# Patient Record
Sex: FEMALE | Race: WHITE | NOT HISPANIC OR LATINO | URBAN - METROPOLITAN AREA
[De-identification: names, ages, dates, MRNs, and addresses within clinical notes are randomized per-mention and may not be internally consistent; named-entity substitution may affect disease eponyms.]

---

## 2020-06-03 PROBLEM — G47.00 INSOMNIA: Status: ACTIVE | Noted: 2020-06-03

## 2020-06-03 PROBLEM — G56.00 CARPAL TUNNEL SYNDROME: Status: ACTIVE | Noted: 2020-06-03

## 2020-06-03 PROBLEM — J30.9 ALLERGIC RHINITIS: Status: ACTIVE | Noted: 2020-06-03

## 2020-06-03 PROBLEM — J45.30 MILD PERSISTENT ASTHMA: Status: ACTIVE | Noted: 2020-06-03

## 2020-06-03 PROBLEM — G43.119 CLASSICAL MIGRAINE WITH INTRACTABLE MIGRAINE: Status: ACTIVE | Noted: 2020-06-03

## 2020-06-03 PROBLEM — F33.1 MODERATE EPISODE OF RECURRENT MAJOR DEPRESSIVE DISORDER (H): Status: ACTIVE | Noted: 2017-04-19

## 2020-06-09 ENCOUNTER — OFFICE VISIT (OUTPATIENT)
Dept: NEUROLOGY | Facility: CLINIC | Age: 45
End: 2020-06-09
Payer: COMMERCIAL

## 2020-06-09 VITALS — HEIGHT: 65 IN

## 2020-06-09 DIAGNOSIS — E66.01 MORBID OBESITY (H): ICD-10-CM

## 2020-06-09 DIAGNOSIS — G43.111 INTRACTABLE MIGRAINE WITH AURA WITH STATUS MIGRAINOSUS: Primary | ICD-10-CM

## 2020-06-09 DIAGNOSIS — F90.0 ATTENTION DEFICIT HYPERACTIVITY DISORDER (ADHD), PREDOMINANTLY INATTENTIVE TYPE: ICD-10-CM

## 2020-06-09 DIAGNOSIS — F33.0 MILD EPISODE OF RECURRENT MAJOR DEPRESSIVE DISORDER (H): ICD-10-CM

## 2020-06-09 PROCEDURE — 99214 OFFICE O/P EST MOD 30 MIN: CPT | Mod: GT | Performed by: PSYCHIATRY & NEUROLOGY

## 2020-06-09 RX ORDER — BUTALBITAL, ASPIRIN, AND CAFFEINE 325; 50; 40 MG/1; MG/1; MG/1
1 CAPSULE ORAL EVERY 4 HOURS PRN
Qty: 30 CAPSULE | Refills: 1 | Status: SHIPPED | OUTPATIENT
Start: 2020-06-09 | End: 2021-09-09

## 2020-06-09 NOTE — LETTER
6/9/2020         RE: Efra Ames  [address] [facility] [address]        Dear Colleague,    Thank you for referring your patient, Efra Ames, to the [facility] NEUROLOGY [place]. Please see a copy of my visit note below.        NEUROLOGY NOTE        Assessment/Plan          Intractable classic migraine with aura, with status migrainosus, more than 20 days a month in the past, currently 1 spell of 7 days migraines monthly on Ajovy started in 2019. Failed beta-blocker, propranolol, tried antidepressants sent with SSRIs without benefits, also tried anticonvulsant but not able to tolerate topiramate. Not interested in try cyclic antidepressant due to weight gain issue with her already having morbid obesity at the same time due to very active severe depression and fearful of drug interactions.  Never tried Botox injections.  Triptan did not work too well.  Currently using Tylenol ibuprofen, does not work well.  Used to use Fiorinal which helps the headache but did feel knocked out in 1 to be sleeping for a few hours.  Like to try it again.  We will send a prescription to the pharmacist.  We will follow-up in about a year.  Discussed about newer generation of abortive migraine medications.    Major depression no recent suicidal ideation. Follow-up closely with psychiatry and psychologist, primary and psychiatrist.    Morbid obesity: Recommend exercise and diet control.    Chronic insomnia due to combination of psychiatric condition and may be living style. On trazodone for this.    ADHD: On Adderall X are 20 mg daily.    This is a virtual visit due to COVID-19 Pandemic to mitigate potential disease spreading. Consent obtained for charge with this visit. Total time spent about 24 minutes.       SUBJECTIVE       Efra Ames is a 45 year old female seen for intractable migraine and other neurological conditions..      The patient came here for second opinion/transfer care to  manage her migraines. She did not like the previous office in general.    She has had migraine since age of 12 but over the years has been gotten much more severe and intractable.    Migraine: She usually has constant pain in her right eye then it can spread to any directions, starting with visual auras sometimes floaters darkness/scotoma and photo fortification, followed by severe headaches and throbs, nausea, vomiting, sensitivity to light, sound, sometimes smell. Over-the-counter medication in general does not work so she does not take them. Recently tried Zomig nasal spray with some benefit but not able to get rid of it completely. The headaches last for a whole day up to 20 days a spell. She has more than 20 severe migraine headaches a month.    She has tried topiramate and that makes migraine much worse. She has been on and off tried propranolol for many years without benefits. She is taking SSRIs for her depression on and off without any benefit for her migraine. She is not interested in amitriptyline/nortriptyline for fear of interaction with other antidepressants. She has not tried calcium channel blockers.    Migraine started as a teenager.     She has major depression and recent suicidal ideation currently attending outpatient treatment working closely with our psychologist and psychiatrist. Cymbalta was recently added.    She has chronic insomnia due to major depression.    She has morbid obesity. She is active but not exercising regularly.    She is working as a health assistance.    She has extensive family history of gynecological cancer and her mother side. As result she had bilateral oophorectomy.    She had MRI study of the brain 20 years ago unremarkable.           Problem List     Patient Active Problem List    Diagnosis Date Noted     Allergic rhinitis 06/03/2020     Priority: Medium     Immunotherapy:              Started: 7/23/2014              Antigens:                            Vial A:  "Cats, Dogs, Grass, Trees & Weeds (Red Vial 0.5mL)                            Vial B: Ragweed, Dust Mites & Molds (Red Vial 0.5mL)                Frequency: Every 2-4 week              Interim Systemic Reactions: no              Local Injection Site Reactions: yes, but these don't bother her              Pre-Medications: Cetirizine (Zyrtec)                 Brings Epi-Pen to all IT Shots: no              Has she found benefit from IT: yes                          If yes, how: Reduced \"contact hives\"              Has she ever stopped IT? no              Can you tell when you are due for your next IT shot? no       Carpal tunnel syndrome 06/03/2020     Priority: Medium     Classical migraine with intractable migraine 06/03/2020     Priority: Medium     Insomnia 06/03/2020     Priority: Medium     Mild persistent asthma 06/03/2020     Priority: Medium     Moderate episode of recurrent major depressive disorder (H) 04/19/2017     Priority: Medium     Pre-diabetes 09/17/2015     Priority: Medium     Migraine headache 01/21/2015     Priority: Medium     Propranolol started July 31, 2017, and referred to neurology  Occasionally uses hydrocodone for breakthrough headaches, 20 tablets last several months.  [facility], second opinion, to begin anticalcitonin gene related peptide medication and verapamil. Also thinking about Botox.       History of strabismus surgery 11/21/2013     Priority: Medium     Gluten intolerance 12/26/2012     Priority: Medium     Improved with diet modifications       Anxiety state 06/06/2012     Priority: Medium     Anisometropia 08/30/2010     Priority: Medium     Hypotropia of left eye 08/30/2010     Priority: Medium         Past medical history     No past surgical history on file.    No past medical history on file.      Family history     Family History   Problem Relation Age of Onset     Migraines Mother          Social history     Social History "     Socioeconomic History     Marital status:      Spouse name: Not on file     Number of children: Not on file     Years of education: Not on file     Highest education level: Not on file   Occupational History     Not on file   Social Needs     Financial resource strain: Not on file     Food insecurity     Worry: Not on file     Inability: Not on file     Transportation needs     Medical: Not on file     Non-medical: Not on file   Tobacco Use     Smoking status: Never Smoker     Smokeless tobacco: Never Used   Substance and Sexual Activity     Alcohol use: Not on file     Drug use: Not on file     Sexual activity: Not on file   Lifestyle     Physical activity     Days per week: Not on file     Minutes per session: Not on file     Stress: Not on file   Relationships     Social connections     Talks on phone: Not on file     Gets together: Not on file     Attends Sabianist service: Not on file     Active member of club or organization: Not on file     Attends meetings of clubs or organizations: Not on file     Relationship status: Not on file     Intimate partner violence     Fear of current or ex partner: Not on file     Emotionally abused: Not on file     Physically abused: Not on file     Forced sexual activity: Not on file   Other Topics Concern     Parent/sibling w/ CABG, MI or angioplasty before 65F 55M? Not Asked   Social History Narrative     Not on file         Allergy     Imitrex [sumatriptan]; Sulfa drugs; and Topiramate    MEDICATIONS List     Current Outpatient Medications   Medication Sig Dispense Refill     albuterol (PROAIR HFA/PROVENTIL HFA/VENTOLIN HFA) 108 (90 Base) MCG/ACT inhaler Inhale 2 puffs into the lungs       amphetamine-dextroamphetamine (ADDERALL XR) 20 MG 24 hr capsule Take 20 mg by mouth       atorvastatin (LIPITOR) 20 MG tablet Take 20 mg by mouth       cetirizine (ZYRTEC) 10 MG tablet Take 10-20 mg by mouth       FLUoxetine (PROZAC) 20 MG capsule Take 20 mg by mouth        "Fremanezumab-vfrm (AJOVY) 225 MG/1.5ML SOAJ        montelukast (SINGULAIR) 10 MG tablet Take 10 mg by mouth       sertraline (ZOLOFT) 100 MG tablet        traZODone (DESYREL) 100 MG tablet Take 100 mg by mouth             Review of system     10 point system review otherwise unremarkable.     PHYSICAL EXAMINATION     Vital signs in last 24 hours:  Vitals:    06/09/20 0912   Height: 1.651 m (5' 5\")         per patient report, similar to last visit note. Please refer to my last clinic note and and subjective report documentation of the current note.               RESULTS REVIEW         Aby Guerra MD, MD, PhD  Neurology   Office tel: [phone]        Again, thank you for allowing me to participate in the care of your patient.        Sincerely,        Aby Guerra MD    "

## 2020-06-09 NOTE — PROGRESS NOTES
NEUROLOGY NOTE        Assessment/Plan          Intractable classic migraine with aura, with status migrainosus, more than 20 days a month in the past, currently 1 spell of 7 days migraines monthly on Ajovy started in 2019. Failed beta-blocker, propranolol, tried antidepressants sent with SSRIs without benefits, also tried anticonvulsant but not able to tolerate topiramate. Not interested in try cyclic antidepressant due to weight gain issue with her already having morbid obesity at the same time due to very active severe depression and fearful of drug interactions.  Never tried Botox injections.  Triptan did not work too well.  Currently using Tylenol ibuprofen, does not work well.  Used to use Fiorinal which helps the headache but did feel knocked out in 1 to be sleeping for a few hours.  Like to try it again.  We will send a prescription to the pharmacist.  We will follow-up in about a year.  Discussed about newer generation of abortive migraine medications.    Major depression no recent suicidal ideation. Follow-up closely with psychiatry and psychologist, primary and psychiatrist.    Morbid obesity: Recommend exercise and diet control.    Chronic insomnia due to combination of psychiatric condition and may be living style. On trazodone for this.    ADHD: On Adderall X are 20 mg daily.    This is a virtual visit due to COVID-19 Pandemic to mitigate potential disease spreading. Consent obtained for charge with this visit. Total time spent about 24 minutes.       SUBJECTIVE       Efra Ames is a 45 year old female seen for intractable migraine and other neurological conditions..      The patient came here for second opinion/transfer care to manage her migraines. She did not like the previous office in general.    She has had migraine since age of 12 but over the years has been gotten much more severe and intractable.    Migraine: She usually has constant pain in her right eye then it can spread to any  directions, starting with visual auras sometimes floaters darkness/scotoma and photo fortification, followed by severe headaches and throbs, nausea, vomiting, sensitivity to light, sound, sometimes smell. Over-the-counter medication in general does not work so she does not take them. Recently tried Zomig nasal spray with some benefit but not able to get rid of it completely. The headaches last for a whole day up to 20 days a spell. She has more than 20 severe migraine headaches a month.    She has tried topiramate and that makes migraine much worse. She has been on and off tried propranolol for many years without benefits. She is taking SSRIs for her depression on and off without any benefit for her migraine. She is not interested in amitriptyline/nortriptyline for fear of interaction with other antidepressants. She has not tried calcium channel blockers.    Migraine started as a teenager.     She has major depression and recent suicidal ideation currently attending outpatient treatment working closely with our psychologist and psychiatrist. Cymbalta was recently added.    She has chronic insomnia due to major depression.    She has morbid obesity. She is active but not exercising regularly.    She is working as a health assistance.    She has extensive family history of gynecological cancer and her mother side. As result she had bilateral oophorectomy.    She had MRI study of the brain 20 years ago unremarkable.           Problem List     Patient Active Problem List    Diagnosis Date Noted     Allergic rhinitis 06/03/2020     Priority: Medium     Immunotherapy:              Started: 7/23/2014              Antigens:                            Vial A: Cats, Dogs, Grass, Trees & Weeds (Red Vial 0.5mL)                            Vial B: Ragweed, Dust Mites & Molds (Red Vial 0.5mL)                Frequency: Every 2-4 week              Interim Systemic Reactions: no              Local Injection Site Reactions: yes, but  "these don't bother her              Pre-Medications: Cetirizine (Zyrtec)                 Brings Epi-Pen to all IT Shots: no              Has she found benefit from IT: yes                          If yes, how: Reduced \"contact hives\"              Has she ever stopped IT? no              Can you tell when you are due for your next IT shot? no       Carpal tunnel syndrome 06/03/2020     Priority: Medium     Classical migraine with intractable migraine 06/03/2020     Priority: Medium     Insomnia 06/03/2020     Priority: Medium     Mild persistent asthma 06/03/2020     Priority: Medium     Moderate episode of recurrent major depressive disorder (H) 04/19/2017     Priority: Medium     Pre-diabetes 09/17/2015     Priority: Medium     Migraine headache 01/21/2015     Priority: Medium     Propranolol started July 31, 2017, and referred to neurology  Occasionally uses hydrocodone for breakthrough headaches, 20 tablets last several months.  [facility], second opinion, to begin anticalcitonin gene related peptide medication and verapamil. Also thinking about Botox.       History of strabismus surgery 11/21/2013     Priority: Medium     Gluten intolerance 12/26/2012     Priority: Medium     Improved with diet modifications       Anxiety state 06/06/2012     Priority: Medium     Anisometropia 08/30/2010     Priority: Medium     Hypotropia of left eye 08/30/2010     Priority: Medium         Past medical history     No past surgical history on file.    No past medical history on file.      Family history     Family History   Problem Relation Age of Onset     Migraines Mother          Social history     Social History     Socioeconomic History     Marital status:      Spouse name: Not on file     Number of children: Not on file     Years of education: Not on file     Highest education level: Not on file   Occupational History     Not on file   Social Needs     Financial resource " strain: Not on file     Food insecurity     Worry: Not on file     Inability: Not on file     Transportation needs     Medical: Not on file     Non-medical: Not on file   Tobacco Use     Smoking status: Never Smoker     Smokeless tobacco: Never Used   Substance and Sexual Activity     Alcohol use: Not on file     Drug use: Not on file     Sexual activity: Not on file   Lifestyle     Physical activity     Days per week: Not on file     Minutes per session: Not on file     Stress: Not on file   Relationships     Social connections     Talks on phone: Not on file     Gets together: Not on file     Attends Worship service: Not on file     Active member of club or organization: Not on file     Attends meetings of clubs or organizations: Not on file     Relationship status: Not on file     Intimate partner violence     Fear of current or ex partner: Not on file     Emotionally abused: Not on file     Physically abused: Not on file     Forced sexual activity: Not on file   Other Topics Concern     Parent/sibling w/ CABG, MI or angioplasty before 65F 55M? Not Asked   Social History Narrative     Not on file         Allergy     Imitrex [sumatriptan]; Sulfa drugs; and Topiramate    MEDICATIONS List     Current Outpatient Medications   Medication Sig Dispense Refill     albuterol (PROAIR HFA/PROVENTIL HFA/VENTOLIN HFA) 108 (90 Base) MCG/ACT inhaler Inhale 2 puffs into the lungs       amphetamine-dextroamphetamine (ADDERALL XR) 20 MG 24 hr capsule Take 20 mg by mouth       atorvastatin (LIPITOR) 20 MG tablet Take 20 mg by mouth       cetirizine (ZYRTEC) 10 MG tablet Take 10-20 mg by mouth       FLUoxetine (PROZAC) 20 MG capsule Take 20 mg by mouth       Fremanezumab-vfrm (AJOVY) 225 MG/1.5ML SOAJ        montelukast (SINGULAIR) 10 MG tablet Take 10 mg by mouth       sertraline (ZOLOFT) 100 MG tablet        traZODone (DESYREL) 100 MG tablet Take 100 mg by mouth             Review of system     10 point system review otherwise  "unremarkable.     PHYSICAL EXAMINATION     Vital signs in last 24 hours:  Vitals:    06/09/20 0912   Height: 1.651 m (5' 5\")         per patient report, similar to last visit note. Please refer to my last clinic note and and subjective report documentation of the current note.               RESULTS REVIEW         Aby Guerra MD, MD, PhD  Neurology   Office tel: [phone]      "

## 2020-06-29 ENCOUNTER — TELEPHONE (OUTPATIENT)
Dept: NEUROLOGY | Facility: CLINIC | Age: 45
End: 2020-06-29

## 2021-09-09 ENCOUNTER — OFFICE VISIT (OUTPATIENT)
Dept: NEUROLOGY | Facility: CLINIC | Age: 46
End: 2021-09-09
Payer: COMMERCIAL

## 2021-09-09 VITALS — HEART RATE: 109 BPM | DIASTOLIC BLOOD PRESSURE: 80 MMHG | SYSTOLIC BLOOD PRESSURE: 113 MMHG | HEIGHT: 65 IN

## 2021-09-09 DIAGNOSIS — G43.719 INTRACTABLE CHRONIC MIGRAINE WITHOUT AURA AND WITHOUT STATUS MIGRAINOSUS: Primary | ICD-10-CM

## 2021-09-09 PROCEDURE — 99215 OFFICE O/P EST HI 40 MIN: CPT | Performed by: PSYCHIATRY & NEUROLOGY

## 2021-09-09 RX ORDER — RIZATRIPTAN BENZOATE 10 MG/1
10 TABLET ORAL
Qty: 18 TABLET | Refills: 1 | Status: SHIPPED | OUTPATIENT
Start: 2021-09-09 | End: 2021-10-25

## 2021-09-09 RX ORDER — FLUTICASONE PROPIONATE AND SALMETEROL XINAFOATE 230; 21 UG/1; UG/1
1 AEROSOL, METERED RESPIRATORY (INHALATION)
COMMUNITY
Start: 2021-03-23

## 2021-09-09 RX ORDER — MULTIVITAMIN
1 TABLET ORAL
COMMUNITY

## 2021-09-09 NOTE — PROGRESS NOTES
NEUROLOGY OUTPATIENT PROGRESS NOTE   Sep 9, 2021     CHIEF COMPLAINT/REASON FOR VISIT/REASON FOR CONSULT  Patient presents with:  Migraine: Nausea and lethargic     REASON FOR CONSULTATION- Headaches    REFERRAL SOURCE  Dr. Guerra  CC Dr. Guerra    HISTORY OF PRESENT ILLNESS  Efra Ames is a 46 year old female seen today for evaluation of headaches. She reports that she has been having headaches since age 12. Previously was seen by Dr. Cuadra. Headaches have been 20 days a month. She was put on Ajovy by Dr. Guerra which reduce the headaches to 10 days a month but currently she is switching insurances and this has been stopped.    Headaches are generally bitemporal. They can also be in the frontal region. Sometimes it is in the middle of the head. Headaches are more of a pressure and occasional throbbing. There is occasional visual auras. She reports associated photophobia phonophobia and nausea. Occasionally will get dizziness and even fall down with the headaches. Denies any clear triggers. Does have mental illness which can affect the headaches. She will get difficulty with thinking when she has a bad headache.    Patient is tried Topamax which she did not tolerate has also been on propanolol which she did not tolerate. Has not tried nortriptyline or amitriptyline. Has been on multiple antidepressants for her depression and anxiety. Currently does not want to add more antidepressants due to weight gain issues and being on multiple antidepressants. For abortive therapy she has been on Zomig which did not work. Has also tried Imitrex which did not work. Has tried over-the-counter Tylenol and ibuprofen without benefit.    Previous history is reviewed and this is unchanged.    PAST MEDICAL/SURGICAL HISTORY  No past medical history on file.  Patient Active Problem List   Diagnosis     Allergic rhinitis     Anisometropia     Anxiety state     Carpal tunnel syndrome     Classical migraine with intractable migraine     Gluten  "intolerance     History of strabismus surgery     Hypotropia of left eye     Insomnia     Migraine headache     Mild persistent asthma     Moderate episode of recurrent major depressive disorder (H)     Pre-diabetes   Significant depression, anxiety, prediabetes, gluten intolerance, high cholesterol    FAMILY HISTORY  Family History   Problem Relation Age of Onset     Migraines Mother    Positive for mother and grandmother with migraines. Daughter with migraines    SOCIAL HISTORY  Social History     Tobacco Use     Smoking status: Never Smoker     Smokeless tobacco: Never Used   Substance Use Topics     Alcohol use: None     Drug use: None       SYSTEMS REVIEW  Twelve-system ROS was done and other than the HPI this was negative except for numbness and tingling, balance coordination problems, dizziness, memory loss, anxiety, depression, respiratory problems/asthma.    MEDICATIONS  albuterol (PROAIR HFA/PROVENTIL HFA/VENTOLIN HFA) 108 (90 Base) MCG/ACT inhaler, Inhale 2 puffs into the lungs  amphetamine-dextroamphetamine (ADDERALL XR) 20 MG 24 hr capsule, Take 20 mg by mouth  cetirizine (ZYRTEC) 10 MG tablet, Take 10-20 mg by mouth  cholecalciferol 50 MCG (2000 UT) CAPS, 3 daily  FLUoxetine (PROZAC) 20 MG capsule, Take 20 mg by mouth  fluticasone-salmeterol (ADVAIR HFA) 230-21 MCG/ACT inhaler, Inhale 1 puff into the lungs  Specialty Vitamins Products (VITAMINS FOR HAIR) TABS, Take 1 tablet by mouth  traZODone (DESYREL) 100 MG tablet, Take 100 mg by mouth  atorvastatin (LIPITOR) 20 MG tablet, Take 20 mg by mouth (Patient not taking: Reported on 9/9/2021)  montelukast (SINGULAIR) 10 MG tablet, Take 10 mg by mouth (Patient not taking: Reported on 9/9/2021)  sertraline (ZOLOFT) 100 MG tablet,     No current facility-administered medications on file prior to visit.       PHYSICAL EXAMINATION  VITALS: /80 (BP Location: Left arm, Patient Position: Sitting)   Pulse 109   Ht 1.651 m (5' 5\")   GENERAL: Healthy " appearing, alert, no acute distress, normal habitus.  CARDIOVASCULAR: Extremities warm and well perfused. Pulses present.   EYES: Funduscopic exam limited.  NEUROLOGICAL:  Patient is awake and oriented to self, place and time.  Attention span is normal.  Memory is grossly intact.  Language is fluent and follows commands appropriately.  Appropriate fund of knowledge. Cranial nerves 2-12 are intact. There is no pronator drift.  Motor exam shows 5/5 strength in all extremities.  Tone is symmetric bilaterally in upper and lower extremities.  Reflexes are symmetric and 2+ in upper extremities and lower extremities. Sensory exam is grossly intact to light touch, pin prick and vibration.  Finger to nose and heel to shin is without dysmetria.  Romberg is negative.  Gait is normal and the patient is able to do tandem walk and walk on toes and heels.      DIAGNOSTICS  RELEVANT LABS  Recent BMP shows elevated creatinine of 1.21.  TSH normal in the past.    OUTSIDE RECORDS  Notes from Dr. Guerra reviewed. Pertinent information is noted in the HPI.    IMPRESSION/REPORT/PLAN  Intractable chronic migraine without aura and without status migrainosus  Obesity  Concurrent use of multiple antidepressants    This is a 46 year old female with headache suggestive of chronic migraines that are refractory to multiple medications. I would like to get a MRI of the brain to rule out any structural lesions. She has tried Topamax, propanolol, Ajovy without any significant benefit. Ajovy did provide partial benefit though did not completely take care of the headaches. For abortive therapy she is tried Imitrex and Zomig without any benefit. Over-the-counter medications are not helpful. She finds benefit with opioids but due to risk of addiction we do not want to use that. Fiorinal did not help. We will put her on Maxalt for abortive therapy and see how she does. We will try to get approval for Botox to see if she would respond to that. Encouraged her  "to keep a log of her headaches to see if pattern can be identified. I can see her back in about a month.    -     MR Brain w/o Contrast; Future  -     rizatriptan (MAXALT) 10 MG tablet; Take 1 tablet (10 mg) by mouth at onset of headache for migraine May repeat in 2 hours.  -     Botulinum Toxin Type A (BOTOX) 200 units injection 200 Units    Return in about 1 month (around 10/9/2021) for In-Clinic Visit, After testing, Botox.     \" The patient has a diagnosis of chronic migraines. She has more than 15 headache days a month with each headache lasting at least 4 hours despite use of triptans. Her headaches have been there for over 6 months. She has been screened for medication overuse headaches and she does not have that. She has tried Topamax, Propranolol for 3 months without any significant benefit. Antidepressants cannot be added since she is on multiple antidepressants already. She has tried Ajovy with minimal success. I would request that the Botox be approved for her. \"    Over 44 minutes were spent coordinating the care for the patient on the day of the encounter.  This includes previsit, during visit and post visit activities as documented above.  Patient new to me. Reviewing chart. Counseling patient regarding migraine treatments. Working on getting Botox prior authorization  (Activities include but not inclusive of reviewing chart, reviewing outside records, reviewing labs and imaging study results as well as the images, patient visit time including getting history and exam,  use if applicable, review of test results with the patient and coming up with a plan in a shared model, counseling patient and family, education and answering patient questions, EMR , EMR diagnosis entry and problem list management, medication reconciliation and prescription management if applicable, paperwork if applicable, printing documents and documentation of the visit activities.)        Edi Rojo, " MD  Neurologist  [facility]  Tel:- [phone]    This note was dictated using voice recognition software.  Any grammatical or context distortions are unintentional and inherent to the software.

## 2021-09-09 NOTE — NURSING NOTE
Chief Complaint   Patient presents with     Migraine     Nausea and lethargic      Geoffrey Bowles MA on 9/9/2021 at 12:26 PM

## 2021-09-09 NOTE — LETTER
9/9/2021         RE: Efra Ames  [address] [place] [facility] [address]        Dear Colleague,    Thank you for referring your patient, Efra Ames, to the [facility] [place]. Please see a copy of my visit note below.    NEUROLOGY OUTPATIENT PROGRESS NOTE   Sep 9, 2021     CHIEF COMPLAINT/REASON FOR VISIT/REASON FOR CONSULT  Patient presents with:  Migraine: Nausea and lethargic     REASON FOR CONSULTATION- Headaches    REFERRAL SOURCE  Dr. Guerra  CC Dr. Guerra    HISTORY OF PRESENT ILLNESS  Efra Ames is a 46 year old female seen today for evaluation of headaches. She reports that she has been having headaches since age 12. Previously was seen by Dr. Cuadra. Headaches have been 20 days a month. She was put on Ajovy by Dr. Guerra which reduce the headaches to 10 days a month but currently she is switching insurances and this has been stopped.    Headaches are generally bitemporal. They can also be in the frontal region. Sometimes it is in the middle of the head. Headaches are more of a pressure and occasional throbbing. There is occasional visual auras. She reports associated photophobia phonophobia and nausea. Occasionally will get dizziness and even fall down with the headaches. Denies any clear triggers. Does have mental illness which can affect the headaches. She will get difficulty with thinking when she has a bad headache.    Patient is tried Topamax which she did not tolerate has also been on propanolol which she did not tolerate. Has not tried nortriptyline or amitriptyline. Has been on multiple antidepressants for her depression and anxiety. Currently does not want to add more antidepressants due to weight gain issues and being on multiple antidepressants. For abortive therapy she has been on Zomig which did not work. Has also tried Imitrex which did not work. Has tried over-the-counter Tylenol and ibuprofen without benefit.    Previous history is reviewed and this  is unchanged.    PAST MEDICAL/SURGICAL HISTORY  No past medical history on file.  Patient Active Problem List   Diagnosis     Allergic rhinitis     Anisometropia     Anxiety state     Carpal tunnel syndrome     Classical migraine with intractable migraine     Gluten intolerance     History of strabismus surgery     Hypotropia of left eye     Insomnia     Migraine headache     Mild persistent asthma     Moderate episode of recurrent major depressive disorder (H)     Pre-diabetes   Significant depression, anxiety, prediabetes, gluten intolerance, high cholesterol    FAMILY HISTORY  Family History   Problem Relation Age of Onset     Migraines Mother    Positive for mother and grandmother with migraines. Daughter with migraines    SOCIAL HISTORY  Social History     Tobacco Use     Smoking status: Never Smoker     Smokeless tobacco: Never Used   Substance Use Topics     Alcohol use: None     Drug use: None       SYSTEMS REVIEW  Twelve-system ROS was done and other than the HPI this was negative except for numbness and tingling, balance coordination problems, dizziness, memory loss, anxiety, depression, respiratory problems/asthma.    MEDICATIONS  albuterol (PROAIR HFA/PROVENTIL HFA/VENTOLIN HFA) 108 (90 Base) MCG/ACT inhaler, Inhale 2 puffs into the lungs  amphetamine-dextroamphetamine (ADDERALL XR) 20 MG 24 hr capsule, Take 20 mg by mouth  cetirizine (ZYRTEC) 10 MG tablet, Take 10-20 mg by mouth  cholecalciferol 50 MCG (2000 UT) CAPS, 3 daily  FLUoxetine (PROZAC) 20 MG capsule, Take 20 mg by mouth  fluticasone-salmeterol (ADVAIR HFA) 230-21 MCG/ACT inhaler, Inhale 1 puff into the lungs  Specialty Vitamins Products (VITAMINS FOR HAIR) TABS, Take 1 tablet by mouth  traZODone (DESYREL) 100 MG tablet, Take 100 mg by mouth  atorvastatin (LIPITOR) 20 MG tablet, Take 20 mg by mouth (Patient not taking: Reported on 9/9/2021)  montelukast (SINGULAIR) 10 MG tablet, Take 10 mg by mouth (Patient not taking: Reported on  "9/9/2021)  sertraline (ZOLOFT) 100 MG tablet,     No current facility-administered medications on file prior to visit.       PHYSICAL EXAMINATION  VITALS: /80 (BP Location: Left arm, Patient Position: Sitting)   Pulse 109   Ht 1.651 m (5' 5\")   GENERAL: Healthy appearing, alert, no acute distress, normal habitus.  CARDIOVASCULAR: Extremities warm and well perfused. Pulses present.   EYES: Funduscopic exam limited.  NEUROLOGICAL:  Patient is awake and oriented to self, place and time.  Attention span is normal.  Memory is grossly intact.  Language is fluent and follows commands appropriately.  Appropriate fund of knowledge. Cranial nerves 2-12 are intact. There is no pronator drift.  Motor exam shows 5/5 strength in all extremities.  Tone is symmetric bilaterally in upper and lower extremities.  Reflexes are symmetric and 2+ in upper extremities and lower extremities. Sensory exam is grossly intact to light touch, pin prick and vibration.  Finger to nose and heel to shin is without dysmetria.  Romberg is negative.  Gait is normal and the patient is able to do tandem walk and walk on toes and heels.      DIAGNOSTICS  RELEVANT LABS  Recent BMP shows elevated creatinine of 1.21.  TSH normal in the past.    OUTSIDE RECORDS  Notes from Dr. Guerra reviewed. Pertinent information is noted in the HPI.    IMPRESSION/REPORT/PLAN  Intractable chronic migraine without aura and without status migrainosus  Obesity  Concurrent use of multiple antidepressants    This is a 46 year old female with headache suggestive of chronic migraines that are refractory to multiple medications. I would like to get a MRI of the brain to rule out any structural lesions. She has tried Topamax, propanolol, Ajovy without any significant benefit. Ajovy did provide partial benefit though did not completely take care of the headaches. For abortive therapy she is tried Imitrex and Zomig without any benefit. Over-the-counter medications are not helpful. " "She finds benefit with opioids but due to risk of addiction we do not want to use that. Fiorinal did not help. We will put her on Maxalt for abortive therapy and see how she does. We will try to get approval for Botox to see if she would respond to that. Encouraged her to keep a log of her headaches to see if pattern can be identified. I can see her back in about a month.    -     MR Brain w/o Contrast; Future  -     rizatriptan (MAXALT) 10 MG tablet; Take 1 tablet (10 mg) by mouth at onset of headache for migraine May repeat in 2 hours.  -     Botulinum Toxin Type A (BOTOX) 200 units injection 200 Units    Return in about 1 month (around 10/9/2021) for In-Clinic Visit, After testing, Botox.     \" The patient has a diagnosis of chronic migraines. She has more than 15 headache days a month with each headache lasting at least 4 hours despite use of triptans. Her headaches have been there for over 6 months. She has been screened for medication overuse headaches and she does not have that. She has tried Topamax, Propranolol for 3 months without any significant benefit. Antidepressants cannot be added since she is on multiple antidepressants already. She has tried Ajovy with minimal success. I would request that the Botox be approved for her. \"    Over 44 minutes were spent coordinating the care for the patient on the day of the encounter.  This includes previsit, during visit and post visit activities as documented above.  Patient new to me. Reviewing chart. Counseling patient regarding migraine treatments. Working on getting Botox prior authorization  (Activities include but not inclusive of reviewing chart, reviewing outside records, reviewing labs and imaging study results as well as the images, patient visit time including getting history and exam,  use if applicable, review of test results with the patient and coming up with a plan in a shared model, counseling patient and family, education and answering " patient questions, EMR , EMR diagnosis entry and problem list management, medication reconciliation and prescription management if applicable, paperwork if applicable, printing documents and documentation of the visit activities.)        Edi Rojo MD  Neurologist  [facility]  Tel:- [phone]    This note was dictated using voice recognition software.  Any grammatical or context distortions are unintentional and inherent to the software.        Again, thank you for allowing me to participate in the care of your patient.        Sincerely,        Edi Rojo MD

## 2021-09-14 ENCOUNTER — TRANSFERRED RECORDS (OUTPATIENT)
Dept: HEALTH INFORMATION MANAGEMENT | Facility: CLINIC | Age: 46
End: 2021-09-14

## 2021-09-28 ENCOUNTER — HOSPITAL ENCOUNTER (OUTPATIENT)
Dept: MRI IMAGING | Facility: HOSPITAL | Age: 46
Discharge: HOME | End: 2021-09-28
Attending: PSYCHIATRY & NEUROLOGY | Admitting: PSYCHIATRY & NEUROLOGY
Payer: COMMERCIAL

## 2021-09-28 DIAGNOSIS — G43.719 INTRACTABLE CHRONIC MIGRAINE WITHOUT AURA AND WITHOUT STATUS MIGRAINOSUS: ICD-10-CM

## 2021-09-28 PROCEDURE — 70551 MRI BRAIN STEM W/O DYE: CPT

## 2021-10-25 ENCOUNTER — OFFICE VISIT (OUTPATIENT)
Dept: NEUROLOGY | Facility: CLINIC | Age: 46
End: 2021-10-25
Payer: COMMERCIAL

## 2021-10-25 VITALS — DIASTOLIC BLOOD PRESSURE: 84 MMHG | SYSTOLIC BLOOD PRESSURE: 131 MMHG | HEART RATE: 104 BPM

## 2021-10-25 DIAGNOSIS — G43.719 INTRACTABLE CHRONIC MIGRAINE WITHOUT AURA AND WITHOUT STATUS MIGRAINOSUS: ICD-10-CM

## 2021-10-25 PROCEDURE — 99214 OFFICE O/P EST MOD 30 MIN: CPT | Mod: 25 | Performed by: PSYCHIATRY & NEUROLOGY

## 2021-10-25 PROCEDURE — 64615 CHEMODENERV MUSC MIGRAINE: CPT | Performed by: PSYCHIATRY & NEUROLOGY

## 2021-10-25 RX ORDER — RIZATRIPTAN BENZOATE 10 MG/1
10 TABLET ORAL
Qty: 18 TABLET | Refills: 11 | Status: SHIPPED | OUTPATIENT
Start: 2021-10-25 | End: 2021-12-27

## 2021-10-25 NOTE — NURSING NOTE
Chief Complaint   Patient presents with     Botox Migraine     Geoffrey Bowles MA on 10/25/2021 at 11:25 AM

## 2021-10-25 NOTE — PROCEDURES
NEUROLOGY PROCEDURE NOTE  Oct 25, 2021      Chronic migraine Botox injection    CONSENT: The procedure was explained to the patient. The risks and benefits of the procedure and the alternatives were discussed with the patient. The patient was given an opportunity to ask any questions she had about the procedure. A verbal consent was obtained from the patient. A written consent is available in the chart.    PRE-PROCEDURE  Diagnosis: Chronic migraine  Headache frequency before the use of Botox:- 25 headache days per month  Headache frequency with Botox use:-NA (first Botox)    EXAM  GENERAL: Healthy appearing, alert, no acute distress, normal habitus.  NEUROLOGICAL:  Patient is alert with fluent language.  Extraocular movements are intact.  Facial movements are present and symmetric.  No arm drift.    PROCEDURE SUMMARY:   The following muscles were identified after palpating for landmarks and the overlying skin was cleansed with alcohol. These muscles were then injected using a 30 guage- half  inch needle with the following doses of onabotulinumtoxin A. A 5 unit/ 0.1 ml dilution was used for the injections. A 2 x 100 unit vial was used.    1. Corrugators 5 units each side.  2. Procerus 5 units.  3. Frontalis 10 units each side.  4. Temporalis 20 units each side.  5. Occipitalis 15 units each side.  6. Paraspinals 10 units each side.  7. Trapezius 15 units each side.    Units Injected: 155 Units  Units Discarded: 45 Units  Lot Number and Expiration: [id number]; 2/2024    The patient tolerated the procedure without any immediate complaints and will call the Neurology clinic if she has any problems or side effects from the medication. The patient will follow up in the Neurology clinic as previously decided.      Edi Rojo MD  Neurologist  [facility]  [phone]

## 2021-10-25 NOTE — LETTER
10/25/2021         RE: Efra Ames  [address] [id number]        Dear Colleague,    Thank you for referring your patient, Efra Ames, to the [facility] [place]. Please see a copy of my visit note below.    NEUROLOGY OUTPATIENT PROGRESS NOTE   Oct 25, 2021     CHIEF COMPLAINT/REASON FOR VISIT/REASON FOR CONSULT  Patient presents with:  Botox Migraine    REASON FOR CONSULTATION- Headaches    REFERRAL SOURCE  Dr. Guerra  CC Dr. Guerra    HISTORY OF PRESENT ILLNESS  Efra Ames is a 46 year old female seen today for evaluation of headaches. She reports that she has been having headaches since age 12. Previously was seen by Dr. Cuadra. Headaches have been 20 days a month. She was put on Ajovy by Dr. Guerra which reduce the headaches to 10 days a month but currently she is switching insurances and this has been stopped.    Headaches are generally bitemporal. They can also be in the frontal region. Sometimes it is in the middle of the head. Headaches are more of a pressure and occasional throbbing. There is occasional visual auras. She reports associated photophobia phonophobia and nausea. Occasionally will get dizziness and even fall down with the headaches. Denies any clear triggers. Does have mental illness which can affect the headaches. She will get difficulty with thinking when she has a bad headache.    Patient is tried Topamax which she did not tolerate has also been on propanolol which she did not tolerate. Has not tried nortriptyline or amitriptyline. Has been on multiple antidepressants for her depression and anxiety. Currently does not want to add more antidepressants due to weight gain issues and being on multiple antidepressants. For abortive therapy she has been on Zomig which did not work. Has also tried Imitrex which did not work. Has tried over-the-counter Tylenol and ibuprofen without benefit.    10/25/21  Patient returns today.  She reports that she  continues to have 25 headache days a month.  She is interested in doing the Botox today but is also interested in trying the Ajovy 1 more time.  She is comfortable with the needles but feels that the Parminder was helping and works in a different mechanism.  Ajovy was stopped last year because the insurance would not cover it anymore.  She reports that the headaches had improved with Ajovy but not completely.  Had questions about the Maxalt.  Headaches have been lasting multiple days.  Headache does become more tolerable after she takes Maxalt for day 1 and wonders if she can take it on day 2 or not.  Discussed with her about taking the Maxalt as soon as she can and repeating it in 2 hours.  She can take the Maxalt the next day the discussed that it might not be as effective as it was on day 1.  She reports no other new issues.    Previous history is reviewed and this is unchanged.    PAST MEDICAL/SURGICAL HISTORY  No past medical history on file.  Patient Active Problem List   Diagnosis     Allergic rhinitis     Anisometropia     Anxiety state     Carpal tunnel syndrome     Classical migraine with intractable migraine     Gluten intolerance     History of strabismus surgery     Hypotropia of left eye     Insomnia     Migraine headache     Mild persistent asthma     Moderate episode of recurrent major depressive disorder (H)     Pre-diabetes   Significant depression, anxiety, prediabetes, gluten intolerance, high cholesterol    FAMILY HISTORY  Family History   Problem Relation Age of Onset     Migraines Mother    Positive for mother and grandmother with migraines. Daughter with migraines    SOCIAL HISTORY  Social History     Tobacco Use     Smoking status: Never Smoker     Smokeless tobacco: Never Used   Substance Use Topics     Alcohol use: Not on file     Drug use: Not on file       SYSTEMS REVIEW  Twelve-system ROS was done and other than the HPI this was negative except for numbness and tingling, balance coordination  problems, dizziness, memory loss, anxiety, depression, respiratory problems/asthma.  No other new issues today.    MEDICATIONS  albuterol (PROAIR HFA/PROVENTIL HFA/VENTOLIN HFA) 108 (90 Base) MCG/ACT inhaler, Inhale 2 puffs into the lungs  amphetamine-dextroamphetamine (ADDERALL XR) 20 MG 24 hr capsule, Take 40 mg by mouth   atorvastatin (LIPITOR) 20 MG tablet, Take 20 mg by mouth   cetirizine (ZYRTEC) 10 MG tablet, Take 10-20 mg by mouth  cholecalciferol 50 MCG (2000 UT) CAPS, 3 daily  FLUoxetine (PROZAC) 20 MG capsule, Take 20 mg by mouth  fluticasone-salmeterol (ADVAIR HFA) 230-21 MCG/ACT inhaler, Inhale 1 puff into the lungs  montelukast (SINGULAIR) 10 MG tablet, Take 10 mg by mouth   Specialty Vitamins Products (VITAMINS FOR HAIR) TABS, Take 1 tablet by mouth  traZODone (DESYREL) 100 MG tablet, Take 100 mg by mouth  sertraline (ZOLOFT) 100 MG tablet,     Botulinum Toxin Type A (BOTOX) 200 units injection 155 Units  Botulinum Toxin Type A (BOTOX) 200 units injection 200 Units         PHYSICAL EXAMINATION  VITALS: /84 (BP Location: Right arm, Patient Position: Sitting)   Pulse 104   GENERAL: Healthy appearing, alert, no acute distress, normal habitus.  CARDIOVASCULAR: Extremities warm and well perfused. Pulses present.   NEUROLOGICAL:  Patient is awake and grossly oriented to self, place and time.  Attention span is normal.  Memory is grossly intact.  Language is fluent and follows commands appropriately.  Appropriate fund of knowledge. Cranial nerves 2-12 are intact. There is no pronator drift.  Motor exam shows 5/5 strength in all extremities.  Tone is symmetric bilaterally in upper and lower extremities.  (Previously reflexes are symmetric and 2+ in upper extremities and lower extremities. Sensory exam is grossly intact to light touch, pin prick and vibration.) Finger to nose and heel to shin is without dysmetria.  Romberg is negative.  Gait is normal and the patient is able to do tandem walk and walk  on toes and heels.      DIAGNOSTICS  RELEVANT LABS  Recent BMP shows elevated creatinine of 1.21.  TSH normal in the past.    OUTSIDE RECORDS  Notes from Dr. Guerra reviewed. Pertinent information is noted in the HPI.     MRI brain-images reviewed with patient.  She does have low-lying cerebellar tonsils though these are very minimal.  No major structural lesions noted.  IMPRESSION:  1.  No evidence of an acute intracranial abnormality.  2.  Low-lying cerebellar tonsils as above.    IMPRESSION/REPORT/PLAN  Intractable chronic migraine without aura and without status migrainosus  Obesity  Concurrent use of multiple antidepressants  Low-lying cerebellar tonsils    This is a 46 year old female with headache suggestive of chronic migraines that are refractory to multiple medications.  MRI brain was negative for structural lesions.  Does show low-lying cerebellar tonsils though most likely these are asymptomatic.  Discussed significance of this with the patient. She has tried Topamax, propanolol, Ajovy without any significant benefit. Ajovy did provide partial benefit though did not completely take care of the headaches.  At this point Ajovy is no longer covered through insurance.  For abortive therapy she is tried Imitrex and Zomig without any benefit. Over-the-counter medications are not helpful. She finds benefit with opioids but due to risk of addiction we do not want to use that. Fiorinal did not help.  Maxalt has slightly been helpful and we discussed proper use of Maxalt.  Discussed that it will become more effective with Botox.  We will proceed with Botox today and then reassess in a few months to see what the next steps would be.  CGRP medication could be tried again down the road.  Encourage her to keep a log of her headaches.  I can see her back in 2 months.    -     rizatriptan (MAXALT) 10 MG tablet; Take 1 tablet (10 mg) by mouth at onset of headache for migraine May repeat in 2 hours.  -     Botulinum Toxin  "Type A (BOTOX) 200 units injection 155 Units    Return in 1 month (on 11/25/2021) for 2 months meds and 3 months Botox.     \" The patient has a diagnosis of chronic migraines. She has more than 15 headache days a month with each headache lasting at least 4 hours despite use of triptans. Her headaches have been there for over 6 months. She has been screened for medication overuse headaches and she does not have that. She has tried Topamax, Propranolol for 3 months without any significant benefit. Antidepressants cannot be added since she is on multiple antidepressants already. She has tried Ajovy with minimal success. I would request that the Botox be approved for her. \"    Over 31 minutes were spent coordinating the care for the patient on the day of the encounter.  This includes previsit, during visit and post visit activities as documented above.  Reviewing MRI images.  Discussion of various medications used to treat headaches.  Discussion of proper use of Maxalt.  (Activities include but not inclusive of reviewing chart, reviewing outside records, reviewing labs and imaging study results as well as the images, patient visit time including getting history and exam,  use if applicable, review of test results with the patient and coming up with a plan in a shared model, counseling patient and family, education and answering patient questions, EMR , EMR diagnosis entry and problem list management, medication reconciliation and prescription management if applicable, paperwork if applicable, printing documents and documentation of the visit activities.)        Edi Rojo MD  Neurologist  [facility]  Tel:- [phone]    This note was dictated using voice recognition software.  Any grammatical or context distortions are unintentional and inherent to the software.        Again, thank you for allowing me to participate in the care of your patient.  "       Sincerely,        Edi Rojo MD

## 2021-10-25 NOTE — PROGRESS NOTES
NEUROLOGY OUTPATIENT PROGRESS NOTE   Oct 25, 2021     CHIEF COMPLAINT/REASON FOR VISIT/REASON FOR CONSULT  Patient presents with:  Botox Migraine    REASON FOR CONSULTATION- Headaches    REFERRAL SOURCE  Dr. Guerra  CC Dr. Guerra    HISTORY OF PRESENT ILLNESS  Efra Ames is a 46 year old female seen today for evaluation of headaches. She reports that she has been having headaches since age 12. Previously was seen by Dr. Cuadra. Headaches have been 20 days a month. She was put on Ajovy by Dr. Guerra which reduce the headaches to 10 days a month but currently she is switching insurances and this has been stopped.    Headaches are generally bitemporal. They can also be in the frontal region. Sometimes it is in the middle of the head. Headaches are more of a pressure and occasional throbbing. There is occasional visual auras. She reports associated photophobia phonophobia and nausea. Occasionally will get dizziness and even fall down with the headaches. Denies any clear triggers. Does have mental illness which can affect the headaches. She will get difficulty with thinking when she has a bad headache.    Patient is tried Topamax which she did not tolerate has also been on propanolol which she did not tolerate. Has not tried nortriptyline or amitriptyline. Has been on multiple antidepressants for her depression and anxiety. Currently does not want to add more antidepressants due to weight gain issues and being on multiple antidepressants. For abortive therapy she has been on Zomig which did not work. Has also tried Imitrex which did not work. Has tried over-the-counter Tylenol and ibuprofen without benefit.    10/25/21  Patient returns today.  She reports that she continues to have 25 headache days a month.  She is interested in doing the Botox today but is also interested in trying the Ajovy 1 more time.  She is comfortable with the needles but feels that the Parminder was helping and works in a different mechanism.  Ajovy was  stopped last year because the insurance would not cover it anymore.  She reports that the headaches had improved with Ajovy but not completely.  Had questions about the Maxalt.  Headaches have been lasting multiple days.  Headache does become more tolerable after she takes Maxalt for day 1 and wonders if she can take it on day 2 or not.  Discussed with her about taking the Maxalt as soon as she can and repeating it in 2 hours.  She can take the Maxalt the next day the discussed that it might not be as effective as it was on day 1.  She reports no other new issues.    Previous history is reviewed and this is unchanged.    PAST MEDICAL/SURGICAL HISTORY  No past medical history on file.  Patient Active Problem List   Diagnosis     Allergic rhinitis     Anisometropia     Anxiety state     Carpal tunnel syndrome     Classical migraine with intractable migraine     Gluten intolerance     History of strabismus surgery     Hypotropia of left eye     Insomnia     Migraine headache     Mild persistent asthma     Moderate episode of recurrent major depressive disorder (H)     Pre-diabetes   Significant depression, anxiety, prediabetes, gluten intolerance, high cholesterol    FAMILY HISTORY  Family History   Problem Relation Age of Onset     Migraines Mother    Positive for mother and grandmother with migraines. Daughter with migraines    SOCIAL HISTORY  Social History     Tobacco Use     Smoking status: Never Smoker     Smokeless tobacco: Never Used   Substance Use Topics     Alcohol use: Not on file     Drug use: Not on file       SYSTEMS REVIEW  Twelve-system ROS was done and other than the HPI this was negative except for numbness and tingling, balance coordination problems, dizziness, memory loss, anxiety, depression, respiratory problems/asthma.  No other new issues today.    MEDICATIONS  albuterol (PROAIR HFA/PROVENTIL HFA/VENTOLIN HFA) 108 (90 Base) MCG/ACT inhaler, Inhale 2 puffs into the  lungs  amphetamine-dextroamphetamine (ADDERALL XR) 20 MG 24 hr capsule, Take 40 mg by mouth   atorvastatin (LIPITOR) 20 MG tablet, Take 20 mg by mouth   cetirizine (ZYRTEC) 10 MG tablet, Take 10-20 mg by mouth  cholecalciferol 50 MCG (2000 UT) CAPS, 3 daily  FLUoxetine (PROZAC) 20 MG capsule, Take 20 mg by mouth  fluticasone-salmeterol (ADVAIR HFA) 230-21 MCG/ACT inhaler, Inhale 1 puff into the lungs  montelukast (SINGULAIR) 10 MG tablet, Take 10 mg by mouth   Specialty Vitamins Products (VITAMINS FOR HAIR) TABS, Take 1 tablet by mouth  traZODone (DESYREL) 100 MG tablet, Take 100 mg by mouth  sertraline (ZOLOFT) 100 MG tablet,     Botulinum Toxin Type A (BOTOX) 200 units injection 155 Units  Botulinum Toxin Type A (BOTOX) 200 units injection 200 Units         PHYSICAL EXAMINATION  VITALS: /84 (BP Location: Right arm, Patient Position: Sitting)   Pulse 104   GENERAL: Healthy appearing, alert, no acute distress, normal habitus.  CARDIOVASCULAR: Extremities warm and well perfused. Pulses present.   NEUROLOGICAL:  Patient is awake and grossly oriented to self, place and time.  Attention span is normal.  Memory is grossly intact.  Language is fluent and follows commands appropriately.  Appropriate fund of knowledge. Cranial nerves 2-12 are intact. There is no pronator drift.  Motor exam shows 5/5 strength in all extremities.  Tone is symmetric bilaterally in upper and lower extremities.  (Previously reflexes are symmetric and 2+ in upper extremities and lower extremities. Sensory exam is grossly intact to light touch, pin prick and vibration.) Finger to nose and heel to shin is without dysmetria.  Romberg is negative.  Gait is normal and the patient is able to do tandem walk and walk on toes and heels.      DIAGNOSTICS  RELEVANT LABS  Recent BMP shows elevated creatinine of 1.21.  TSH normal in the past.    OUTSIDE RECORDS  Notes from Dr. Guerra reviewed. Pertinent information is noted in the HPI.     MRI brain-images  "reviewed with patient.  She does have low-lying cerebellar tonsils though these are very minimal.  No major structural lesions noted.  IMPRESSION:  1.  No evidence of an acute intracranial abnormality.  2.  Low-lying cerebellar tonsils as above.    IMPRESSION/REPORT/PLAN  Intractable chronic migraine without aura and without status migrainosus  Obesity  Concurrent use of multiple antidepressants  Low-lying cerebellar tonsils    This is a 46 year old female with headache suggestive of chronic migraines that are refractory to multiple medications.  MRI brain was negative for structural lesions.  Does show low-lying cerebellar tonsils though most likely these are asymptomatic.  Discussed significance of this with the patient. She has tried Topamax, propanolol, Ajovy without any significant benefit. Ajovy did provide partial benefit though did not completely take care of the headaches.  At this point Ajovy is no longer covered through insurance.  For abortive therapy she is tried Imitrex and Zomig without any benefit. Over-the-counter medications are not helpful. She finds benefit with opioids but due to risk of addiction we do not want to use that. Fiorinal did not help.  Maxalt has slightly been helpful and we discussed proper use of Maxalt.  Discussed that it will become more effective with Botox.  We will proceed with Botox today and then reassess in a few months to see what the next steps would be.  CGRP medication could be tried again down the road.  Encourage her to keep a log of her headaches.  I can see her back in 2 months.    -     rizatriptan (MAXALT) 10 MG tablet; Take 1 tablet (10 mg) by mouth at onset of headache for migraine May repeat in 2 hours.  -     Botulinum Toxin Type A (BOTOX) 200 units injection 155 Units    Return in 1 month (on 11/25/2021) for 2 months meds and 3 months Botox.     \" The patient has a diagnosis of chronic migraines. She has more than 15 headache days a month with each headache " "lasting at least 4 hours despite use of triptans. Her headaches have been there for over 6 months. She has been screened for medication overuse headaches and she does not have that. She has tried Topamax, Propranolol for 3 months without any significant benefit. Antidepressants cannot be added since she is on multiple antidepressants already. She has tried Ajovy with minimal success. I would request that the Botox be approved for her. \"    Over 31 minutes were spent coordinating the care for the patient on the day of the encounter.  This includes previsit, during visit and post visit activities as documented above.  Reviewing MRI images.  Discussion of various medications used to treat headaches.  Discussion of proper use of Maxalt.  (Activities include but not inclusive of reviewing chart, reviewing outside records, reviewing labs and imaging study results as well as the images, patient visit time including getting history and exam,  use if applicable, review of test results with the patient and coming up with a plan in a shared model, counseling patient and family, education and answering patient questions, EMR , EMR diagnosis entry and problem list management, medication reconciliation and prescription management if applicable, paperwork if applicable, printing documents and documentation of the visit activities.)        Edi Rojo MD  Neurologist  [facility]  Tel:- [phone]    This note was dictated using voice recognition software.  Any grammatical or context distortions are unintentional and inherent to the software.    "

## 2021-12-27 ENCOUNTER — OFFICE VISIT (OUTPATIENT)
Dept: NEUROLOGY | Facility: CLINIC | Age: 46
End: 2021-12-27
Payer: COMMERCIAL

## 2021-12-27 VITALS
HEIGHT: 65 IN | BODY MASS INDEX: 37.32 KG/M2 | HEART RATE: 92 BPM | SYSTOLIC BLOOD PRESSURE: 126 MMHG | WEIGHT: 224 LBS | DIASTOLIC BLOOD PRESSURE: 88 MMHG

## 2021-12-27 DIAGNOSIS — G43.719 INTRACTABLE CHRONIC MIGRAINE WITHOUT AURA AND WITHOUT STATUS MIGRAINOSUS: Primary | ICD-10-CM

## 2021-12-27 PROCEDURE — 99214 OFFICE O/P EST MOD 30 MIN: CPT | Performed by: PSYCHIATRY & NEUROLOGY

## 2021-12-27 RX ORDER — RIMEGEPANT SULFATE 75 MG/75MG
1 TABLET, ORALLY DISINTEGRATING ORAL DAILY PRN
Qty: 12 TABLET | Refills: 11 | Status: SHIPPED | OUTPATIENT
Start: 2021-12-27 | End: 2023-01-30

## 2021-12-27 RX ORDER — RIZATRIPTAN BENZOATE 10 MG/1
10 TABLET ORAL
Qty: 18 TABLET | Refills: 11 | Status: SHIPPED | OUTPATIENT
Start: 2021-12-27 | End: 2023-01-30

## 2021-12-27 ASSESSMENT — MIFFLIN-ST. JEOR: SCORE: 1661.94

## 2021-12-27 NOTE — NURSING NOTE
Chief Complaint   Patient presents with     Follow Up     migraine     Michael Antony MA on 12/27/2021 at 12:29 PM

## 2021-12-27 NOTE — PROGRESS NOTES
NEUROLOGY OUTPATIENT PROGRESS NOTE   Dec 27, 2021     CHIEF COMPLAINT/REASON FOR VISIT/REASON FOR CONSULT  Patient presents with:  Follow Up: migraine    REASON FOR CONSULTATION- Headaches    REFERRAL SOURCE  Dr. Guerra  CC Dr. Guerra    HISTORY OF PRESENT ILLNESS  Efra Ames is a 46 year old female seen today for evaluation of headaches. She reports that she has been having headaches since age 12. Previously was seen by Dr. Cuadra. Headaches have been 20 days a month. She was put on Ajovy by Dr. Guerra which reduce the headaches to 10 days a month but currently she is switching insurances and this has been stopped.    Headaches are generally bitemporal. They can also be in the frontal region. Sometimes it is in the middle of the head. Headaches are more of a pressure and occasional throbbing. There is occasional visual auras. She reports associated photophobia phonophobia and nausea. Occasionally will get dizziness and even fall down with the headaches. Denies any clear triggers. Does have mental illness which can affect the headaches. She will get difficulty with thinking when she has a bad headache.    Patient is tried Topamax which she did not tolerate has also been on propanolol which she did not tolerate. Has not tried nortriptyline or amitriptyline. Has been on multiple antidepressants for her depression and anxiety. Currently does not want to add more antidepressants due to weight gain issues and being on multiple antidepressants. For abortive therapy she has been on Zomig which did not work. Has also tried Imitrex which did not work. Has tried over-the-counter Tylenol and ibuprofen without benefit.    10/25/21  Patient returns today.  She reports that she continues to have 25 headache days a month.  She is interested in doing the Botox today but is also interested in trying the Ajovy 1 more time.  She is comfortable with the needles but feels that the Parminder was helping and works in a different mechanism.  Ajovy was  stopped last year because the insurance would not cover it anymore.  She reports that the headaches had improved with Ajovy but not completely.  Had questions about the Maxalt.  Headaches have been lasting multiple days.  Headache does become more tolerable after she takes Maxalt for day 1 and wonders if she can take it on day 2 or not.  Discussed with her about taking the Maxalt as soon as she can and repeating it in 2 hours.  She can take the Maxalt the next day the discussed that it might not be as effective as it was on day 1.  She reports no other new issues.    12/27/21  Patient returns today.  She previously was having 25 headache days a month but now is having 16-18 headache days a month.  Feels that the Botox has reduced the edge and headaches are less severe.  Continues to have headaches on the right side.  Occasionally will get the visual auras.    Is on Maxalt but feels that it occasionally will not work.  Is not using anything with the Maxalt.  Does not feel she has enough medication for the whole month.  Is interested in trying new medications.  Given that she gets limited supply of Maxalt she cannot predict which headaches are going to be severe enough that she wants to use the Maxalt or not.  Does daily using the Maxalt.    We discussed the Ajovy and it was decided that we will hold off on that for right now since she is trying the Botox and trying to medications at the same time might not be very beneficial.    Previous history is reviewed and this is unchanged.    PAST MEDICAL/SURGICAL HISTORY  No past medical history on file.  Patient Active Problem List   Diagnosis     Allergic rhinitis     Anisometropia     Anxiety state     Carpal tunnel syndrome     Classical migraine with intractable migraine     Gluten intolerance     History of strabismus surgery     Hypotropia of left eye     Insomnia     Migraine headache     Mild persistent asthma     Moderate episode of recurrent major depressive  "disorder (H)     Pre-diabetes   Significant depression, anxiety, prediabetes, gluten intolerance, high cholesterol    FAMILY HISTORY  Family History   Problem Relation Age of Onset     Migraines Mother    Positive for mother and grandmother with migraines. Daughter with migraines    SOCIAL HISTORY  Social History     Tobacco Use     Smoking status: Never Smoker     Smokeless tobacco: Never Used   Substance Use Topics     Alcohol use: None     Drug use: None       SYSTEMS REVIEW  Twelve-system ROS was done and other than the HPI this was negative except for numbness and tingling, balance coordination problems, dizziness, memory loss, anxiety, depression, respiratory problems/asthma.  No other new issues reported today.    MEDICATIONS  albuterol (PROAIR HFA/PROVENTIL HFA/VENTOLIN HFA) 108 (90 Base) MCG/ACT inhaler, Inhale 2 puffs into the lungs  amphetamine-dextroamphetamine (ADDERALL XR) 20 MG 24 hr capsule, Take 40 mg by mouth   atorvastatin (LIPITOR) 20 MG tablet, Take 20 mg by mouth   cetirizine (ZYRTEC) 10 MG tablet, Take 10-20 mg by mouth  cholecalciferol 50 MCG (2000 UT) CAPS, 3 daily  FLUoxetine (PROZAC) 20 MG capsule, Take 60 mg by mouth   fluticasone-salmeterol (ADVAIR HFA) 230-21 MCG/ACT inhaler, Inhale 1 puff into the lungs  traZODone (DESYREL) 100 MG tablet, Take 100 mg by mouth  montelukast (SINGULAIR) 10 MG tablet, Take 10 mg by mouth  (Patient not taking: Reported on 12/27/2021)  sertraline (ZOLOFT) 100 MG tablet,   Specialty Vitamins Products (VITAMINS FOR HAIR) TABS, Take 1 tablet by mouth (Patient not taking: Reported on 12/27/2021)    Botulinum Toxin Type A (BOTOX) 200 units injection 155 Units  Botulinum Toxin Type A (BOTOX) 200 units injection 200 Units         PHYSICAL EXAMINATION  VITALS: /88 (BP Location: Right arm, Patient Position: Sitting)   Pulse 92   Ht 1.651 m (5' 5\")   Wt 101.6 kg (224 lb)   BMI 37.28 kg/m    GENERAL: Healthy appearing, alert, no acute distress, normal " habitus.  CARDIOVASCULAR: Extremities warm and well perfused. Pulses present.   NEUROLOGICAL:  Patient is awake and grossly oriented to self, place and time.  Attention span is normal.  Memory is grossly intact.  Language is fluent and follows commands appropriately.  Appropriate fund of knowledge. Cranial nerves 2-12 are intact. There is no pronator drift.  Motor exam shows 5/5 strength in all extremities.  Tone is symmetric bilaterally in upper and lower extremities.  (Previously reflexes are symmetric and 2+ in upper extremities and lower extremities. Sensory exam is grossly intact to light touch, pin prick and vibration.) Finger to nose and heel to shin is without dysmetria.  Romberg is negative.  Gait is normal and the patient is able to do tandem walk and walk on toes and heels.  No significant change in exam today      DIAGNOSTICS  RELEVANT LABS  Recent BMP shows elevated creatinine of 1.21.  TSH normal in the past.    OUTSIDE RECORDS  Notes from Dr. Guerra reviewed. Pertinent information is noted in the HPI.     MRI brain-images reviewed with patient.  She does have low-lying cerebellar tonsils though these are very minimal.  No major structural lesions noted.  IMPRESSION:  1.  No evidence of an acute intracranial abnormality.  2.  Low-lying cerebellar tonsils as above.    IMPRESSION/REPORT/PLAN  Intractable chronic migraine without aura and without status migrainosus  Obesity  Concurrent use of multiple antidepressants  Low-lying cerebellar tonsils    This is a 46 year old female with headache suggestive of chronic migraines that are refractory to multiple medications.  MRI brain was negative for structural lesions.  Does show low-lying cerebellar tonsils though most likely these are asymptomatic.  Discussed significance of this with the patient. She has tried Topamax, propanolol, Ajovy without any significant benefit. Ajovy did provide partial benefit though did not completely take care of the headaches.  At  "this point Ajovy is no longer covered through insurance.  We will hold off on trying Ajovy since Botox is being tried right now.  She has had 35% reduction in headaches and 50% improvement in severity.  So far with the first session of Botox.  Discussed with her that we need to do at least 2 or 3 sessions of Botox to see if she gets full significant reduction.      For abortive therapy she is tried Imitrex and Zomig without any benefit. Over-the-counter medications are not helpful. She finds benefit with opioids but due to risk of addiction we do not want to use that. Fiorinal did not help.  Maxalt has slightly been helpful and we discussed proper use of Maxalt.  I will have her use Maxalt with ibuprofen 400 mg to see if that becomes more effective.  I am hopeful as the Botox kicks in the maxilla will start working better.  I will prescribe her Nurtec in the meantime to see if that helps with abortive therapy.       Encourage her to keep a log of her headaches.  I can see her back in 3 months.    -     Botulinum Toxin Type A (BOTOX) 200 units injection 155 Units-continue  -    Ibuprofen 400 mg initially and then Maxalt in 1 hour.  -     Rimegepant Sulfate (NURTEC) 75 MG TBDP; Take 1 tablet by mouth daily as needed (Migraine) Repeat in 2 hours if needed  -     rizatriptan (MAXALT) 10 MG tablet; Take 1 tablet (10 mg) by mouth at onset of headache for migraine May repeat in 2 hours.      Return for 1 month Botox and 3 months meds.     Botox PA  \" The patient has a diagnosis of chronic migraines. She has more than 15 headache days a month with each headache lasting at least 4 hours despite use of triptans. Her headaches have been there for over 6 months. She has been screened for medication overuse headaches and she does not have that. She has tried Topamax, Propranolol for 3 months without any significant benefit. Antidepressants cannot be added since she is on multiple antidepressants already. She has tried Ajovy with " "minimal success. I would request that the Botox be approved for her.  She is a 35% improvement in frequency and 50% improvement in severity with the first session of Botox.  The second session of Botox should provide more benefit.\"    Over 33 minutes were spent coordinating the care for the patient on the day of the encounter.  This includes previsit, during visit and post visit activities as documented above.  Prescription management.  Refractory headache problem.  (Activities include but not inclusive of reviewing chart, reviewing outside records, reviewing labs and imaging study results as well as the images, patient visit time including getting history and exam,  use if applicable, review of test results with the patient and coming up with a plan in a shared model, counseling patient and family, education and answering patient questions, EMR , EMR diagnosis entry and problem list management, medication reconciliation and prescription management if applicable, paperwork if applicable, printing documents and documentation of the visit activities.)        Edi Rojo MD  Neurologist  [facility]  Tel:- [phone]    This note was dictated using voice recognition software.  Any grammatical or context distortions are unintentional and inherent to the software.    "

## 2021-12-27 NOTE — LETTER
12/27/2021         RE: Efra Ames  [address] [id number]        Dear Colleague,    Thank you for referring your patient, Efra Ames, to the [facility] [place]. Please see a copy of my visit note below.    NEUROLOGY OUTPATIENT PROGRESS NOTE   Dec 27, 2021     CHIEF COMPLAINT/REASON FOR VISIT/REASON FOR CONSULT  Patient presents with:  Follow Up: migraine    REASON FOR CONSULTATION- Headaches    REFERRAL SOURCE  Dr. Guerra  CC Dr. Guerra    HISTORY OF PRESENT ILLNESS  Efra Ames is a 46 year old female seen today for evaluation of headaches. She reports that she has been having headaches since age 12. Previously was seen by Dr. Cuadra. Headaches have been 20 days a month. She was put on Ajovy by Dr. Guerra which reduce the headaches to 10 days a month but currently she is switching insurances and this has been stopped.    Headaches are generally bitemporal. They can also be in the frontal region. Sometimes it is in the middle of the head. Headaches are more of a pressure and occasional throbbing. There is occasional visual auras. She reports associated photophobia phonophobia and nausea. Occasionally will get dizziness and even fall down with the headaches. Denies any clear triggers. Does have mental illness which can affect the headaches. She will get difficulty with thinking when she has a bad headache.    Patient is tried Topamax which she did not tolerate has also been on propanolol which she did not tolerate. Has not tried nortriptyline or amitriptyline. Has been on multiple antidepressants for her depression and anxiety. Currently does not want to add more antidepressants due to weight gain issues and being on multiple antidepressants. For abortive therapy she has been on Zomig which did not work. Has also tried Imitrex which did not work. Has tried over-the-counter Tylenol and ibuprofen without benefit.    10/25/21  Patient returns today.  She reports that  she continues to have 25 headache days a month.  She is interested in doing the Botox today but is also interested in trying the Ajovy 1 more time.  She is comfortable with the needles but feels that the Parminder was helping and works in a different mechanism.  Ajovy was stopped last year because the insurance would not cover it anymore.  She reports that the headaches had improved with Ajovy but not completely.  Had questions about the Maxalt.  Headaches have been lasting multiple days.  Headache does become more tolerable after she takes Maxalt for day 1 and wonders if she can take it on day 2 or not.  Discussed with her about taking the Maxalt as soon as she can and repeating it in 2 hours.  She can take the Maxalt the next day the discussed that it might not be as effective as it was on day 1.  She reports no other new issues.    12/27/21  Patient returns today.  She previously was having 25 headache days a month but now is having 16-18 headache days a month.  Feels that the Botox has reduced the edge and headaches are less severe.  Continues to have headaches on the right side.  Occasionally will get the visual auras.    Is on Maxalt but feels that it occasionally will not work.  Is not using anything with the Maxalt.  Does not feel she has enough medication for the whole month.  Is interested in trying new medications.  Given that she gets limited supply of Maxalt she cannot predict which headaches are going to be severe enough that she wants to use the Maxalt or not.  Does daily using the Maxalt.    We discussed the Ajovy and it was decided that we will hold off on that for right now since she is trying the Botox and trying to medications at the same time might not be very beneficial.    Previous history is reviewed and this is unchanged.    PAST MEDICAL/SURGICAL HISTORY  No past medical history on file.  Patient Active Problem List   Diagnosis     Allergic rhinitis     Anisometropia     Anxiety state     Carpal  tunnel syndrome     Classical migraine with intractable migraine     Gluten intolerance     History of strabismus surgery     Hypotropia of left eye     Insomnia     Migraine headache     Mild persistent asthma     Moderate episode of recurrent major depressive disorder (H)     Pre-diabetes   Significant depression, anxiety, prediabetes, gluten intolerance, high cholesterol    FAMILY HISTORY  Family History   Problem Relation Age of Onset     Migraines Mother    Positive for mother and grandmother with migraines. Daughter with migraines    SOCIAL HISTORY  Social History     Tobacco Use     Smoking status: Never Smoker     Smokeless tobacco: Never Used   Substance Use Topics     Alcohol use: None     Drug use: None       SYSTEMS REVIEW  Twelve-system ROS was done and other than the HPI this was negative except for numbness and tingling, balance coordination problems, dizziness, memory loss, anxiety, depression, respiratory problems/asthma.  No other new issues reported today.    MEDICATIONS  albuterol (PROAIR HFA/PROVENTIL HFA/VENTOLIN HFA) 108 (90 Base) MCG/ACT inhaler, Inhale 2 puffs into the lungs  amphetamine-dextroamphetamine (ADDERALL XR) 20 MG 24 hr capsule, Take 40 mg by mouth   atorvastatin (LIPITOR) 20 MG tablet, Take 20 mg by mouth   cetirizine (ZYRTEC) 10 MG tablet, Take 10-20 mg by mouth  cholecalciferol 50 MCG (2000 UT) CAPS, 3 daily  FLUoxetine (PROZAC) 20 MG capsule, Take 60 mg by mouth   fluticasone-salmeterol (ADVAIR HFA) 230-21 MCG/ACT inhaler, Inhale 1 puff into the lungs  traZODone (DESYREL) 100 MG tablet, Take 100 mg by mouth  montelukast (SINGULAIR) 10 MG tablet, Take 10 mg by mouth  (Patient not taking: Reported on 12/27/2021)  sertraline (ZOLOFT) 100 MG tablet,   Specialty Vitamins Products (VITAMINS FOR HAIR) TABS, Take 1 tablet by mouth (Patient not taking: Reported on 12/27/2021)    Botulinum Toxin Type A (BOTOX) 200 units injection 155 Units  Botulinum Toxin Type A (BOTOX) 200 units  "injection 200 Units         PHYSICAL EXAMINATION  VITALS: /88 (BP Location: Right arm, Patient Position: Sitting)   Pulse 92   Ht 1.651 m (5' 5\")   Wt 101.6 kg (224 lb)   BMI 37.28 kg/m    GENERAL: Healthy appearing, alert, no acute distress, normal habitus.  CARDIOVASCULAR: Extremities warm and well perfused. Pulses present.   NEUROLOGICAL:  Patient is awake and grossly oriented to self, place and time.  Attention span is normal.  Memory is grossly intact.  Language is fluent and follows commands appropriately.  Appropriate fund of knowledge. Cranial nerves 2-12 are intact. There is no pronator drift.  Motor exam shows 5/5 strength in all extremities.  Tone is symmetric bilaterally in upper and lower extremities.  (Previously reflexes are symmetric and 2+ in upper extremities and lower extremities. Sensory exam is grossly intact to light touch, pin prick and vibration.) Finger to nose and heel to shin is without dysmetria.  Romberg is negative.  Gait is normal and the patient is able to do tandem walk and walk on toes and heels.  No significant change in exam today      DIAGNOSTICS  RELEVANT LABS  Recent BMP shows elevated creatinine of 1.21.  TSH normal in the past.    OUTSIDE RECORDS  Notes from Dr. Guerra reviewed. Pertinent information is noted in the HPI.     MRI brain-images reviewed with patient.  She does have low-lying cerebellar tonsils though these are very minimal.  No major structural lesions noted.  IMPRESSION:  1.  No evidence of an acute intracranial abnormality.  2.  Low-lying cerebellar tonsils as above.    IMPRESSION/REPORT/PLAN  Intractable chronic migraine without aura and without status migrainosus  Obesity  Concurrent use of multiple antidepressants  Low-lying cerebellar tonsils    This is a 46 year old female with headache suggestive of chronic migraines that are refractory to multiple medications.  MRI brain was negative for structural lesions.  Does show low-lying cerebellar tonsils " "though most likely these are asymptomatic.  Discussed significance of this with the patient. She has tried Topamax, propanolol, Ajovy without any significant benefit. Ajovy did provide partial benefit though did not completely take care of the headaches.  At this point Ajovy is no longer covered through insurance.  We will hold off on trying Ajovy since Botox is being tried right now.  She has had 35% reduction in headaches and 50% improvement in severity.  So far with the first session of Botox.  Discussed with her that we need to do at least 2 or 3 sessions of Botox to see if she gets full significant reduction.      For abortive therapy she is tried Imitrex and Zomig without any benefit. Over-the-counter medications are not helpful. She finds benefit with opioids but due to risk of addiction we do not want to use that. Fiorinal did not help.  Maxalt has slightly been helpful and we discussed proper use of Maxalt.  I will have her use Maxalt with ibuprofen 400 mg to see if that becomes more effective.  I am hopeful as the Botox kicks in the maxilla will start working better.  I will prescribe her Nurtec in the meantime to see if that helps with abortive therapy.       Encourage her to keep a log of her headaches.  I can see her back in 3 months.    -     Botulinum Toxin Type A (BOTOX) 200 units injection 155 Units-continue  -    Ibuprofen 400 mg initially and then Maxalt in 1 hour.  -     Rimegepant Sulfate (NURTEC) 75 MG TBDP; Take 1 tablet by mouth daily as needed (Migraine) Repeat in 2 hours if needed  -     rizatriptan (MAXALT) 10 MG tablet; Take 1 tablet (10 mg) by mouth at onset of headache for migraine May repeat in 2 hours.      Return for 1 month Botox and 3 months meds.     Botox PA  \" The patient has a diagnosis of chronic migraines. She has more than 15 headache days a month with each headache lasting at least 4 hours despite use of triptans. Her headaches have been there for over 6 months. She has " "been screened for medication overuse headaches and she does not have that. She has tried Topamax, Propranolol for 3 months without any significant benefit. Antidepressants cannot be added since she is on multiple antidepressants already. She has tried Ajovy with minimal success. I would request that the Botox be approved for her.  She is a 35% improvement in frequency and 50% improvement in severity with the first session of Botox.  The second session of Botox should provide more benefit.\"    Over 33 minutes were spent coordinating the care for the patient on the day of the encounter.  This includes previsit, during visit and post visit activities as documented above.  Prescription management.  Refractory headache problem.  (Activities include but not inclusive of reviewing chart, reviewing outside records, reviewing labs and imaging study results as well as the images, patient visit time including getting history and exam,  use if applicable, review of test results with the patient and coming up with a plan in a shared model, counseling patient and family, education and answering patient questions, EMR , EMR diagnosis entry and problem list management, medication reconciliation and prescription management if applicable, paperwork if applicable, printing documents and documentation of the visit activities.)        Edi Rojo MD  Neurologist  [facility]  Tel:- [phone]    This note was dictated using voice recognition software.  Any grammatical or context distortions are unintentional and inherent to the software.        Again, thank you for allowing me to participate in the care of your patient.        Sincerely,        Edi Rojo MD    "

## 2022-01-28 ENCOUNTER — OFFICE VISIT (OUTPATIENT)
Dept: NEUROLOGY | Facility: CLINIC | Age: 47
End: 2022-01-28
Payer: COMMERCIAL

## 2022-01-28 VITALS
DIASTOLIC BLOOD PRESSURE: 83 MMHG | SYSTOLIC BLOOD PRESSURE: 135 MMHG | HEART RATE: 112 BPM | HEIGHT: 65 IN | WEIGHT: 224 LBS | BODY MASS INDEX: 37.32 KG/M2

## 2022-01-28 DIAGNOSIS — G43.719 INTRACTABLE CHRONIC MIGRAINE WITHOUT AURA AND WITHOUT STATUS MIGRAINOSUS: Primary | ICD-10-CM

## 2022-01-28 DIAGNOSIS — E66.01 MORBID OBESITY (H): ICD-10-CM

## 2022-01-28 PROCEDURE — 64615 CHEMODENERV MUSC MIGRAINE: CPT | Performed by: PSYCHIATRY & NEUROLOGY

## 2022-01-28 ASSESSMENT — MIFFLIN-ST. JEOR: SCORE: 1661.94

## 2022-01-28 NOTE — PROCEDURES
NEUROLOGY PROCEDURE NOTE  Jan 28, 2022      Chronic migraine Botox injection    CONSENT: The procedure was explained to the patient. The risks and benefits of the procedure and the alternatives were discussed with the patient. The patient was given an opportunity to ask any questions she had about the procedure. A verbal consent was obtained from the patient. A written consent is available in the chart.    PRE-PROCEDURE  Diagnosis: Chronic migraine  Headache frequency before the use of Botox:- 25 headache days per month  Headache frequency with Botox use:-50% improvement after first Botox    EXAM  GENERAL: Healthy appearing, alert, no acute distress, normal habitus.  NEUROLOGICAL:  Patient is alert with fluent language.  Extraocular movements are intact.  Facial movements are present and symmetric.  No arm drift.    PROCEDURE SUMMARY:   The following muscles were identified after palpating for landmarks and the overlying skin was cleansed with alcohol. These muscles were then injected using a 30 guage- half  inch needle with the following doses of onabotulinumtoxin A. A 5 unit/ 0.1 ml dilution was used for the injections. A 2 x 100 unit vial was used.    1. Corrugators 5 units each side.  2. Procerus 5 units.  3. Frontalis 10 units each side.  4. Temporalis 20 units each side.  5. Occipitalis 15 units each side.  6. Paraspinals 10 units each side.  7. Trapezius 15 units each side.    Units Injected: 155 Units  Units Discarded: 45 Units  Lot Number and Expiration: [id number]; 2/2024    The patient tolerated the procedure without any immediate complaints and will call the Neurology clinic if she has any problems or side effects from the medication. The patient will follow up in the Neurology clinic as previously decided.      Edi Rojo MD  Neurologist  [facility]  [phone]

## 2022-01-28 NOTE — NURSING NOTE
Chief Complaint   Patient presents with     Botox     migraines      Michael Antony MA on 1/28/2022 at 10:00 AM

## 2022-04-29 ENCOUNTER — OFFICE VISIT (OUTPATIENT)
Dept: NEUROLOGY | Facility: CLINIC | Age: 47
End: 2022-04-29
Payer: COMMERCIAL

## 2022-04-29 VITALS
BODY MASS INDEX: 38.24 KG/M2 | SYSTOLIC BLOOD PRESSURE: 110 MMHG | WEIGHT: 224 LBS | DIASTOLIC BLOOD PRESSURE: 81 MMHG | HEIGHT: 64 IN | HEART RATE: 106 BPM

## 2022-04-29 DIAGNOSIS — G43.719 INTRACTABLE CHRONIC MIGRAINE WITHOUT AURA AND WITHOUT STATUS MIGRAINOSUS: Primary | ICD-10-CM

## 2022-04-29 PROCEDURE — 64615 CHEMODENERV MUSC MIGRAINE: CPT | Performed by: PSYCHIATRY & NEUROLOGY

## 2022-04-29 NOTE — LETTER
4/29/2022         RE: Efra Ames  [address] [address]        Dear Colleague,    Thank you for referring your patient, Efra Ames, to the [facility] NEUROLOGY CLINIC [place]. Please see a copy of my visit note below.    See procedure note.       Again, thank you for allowing me to participate in the care of your patient.        Sincerely,        Edi Rojo MD

## 2022-04-29 NOTE — PROCEDURES
NEUROLOGY PROCEDURE NOTE  Apr 29, 2022      Chronic migraine Botox injection    CONSENT: The procedure was explained to the patient. The risks and benefits of the procedure and the alternatives were discussed with the patient. The patient was given an opportunity to ask any questions she had about the procedure. A verbal consent was obtained from the patient. A written consent is available in the chart.    PRE-PROCEDURE  Diagnosis: Chronic migraine  Headache frequency before the use of Botox:- 25 headache days per month  Headache frequency with Botox use:-40% improvement after 2 Botox (15-18 headaches days but lasting fewer hours and less severe)    EXAM  GENERAL: Healthy appearing, alert, no acute distress, normal habitus.  NEUROLOGICAL:  Patient is alert with fluent language.  Extraocular movements are intact.  Facial movements are present and symmetric.  No arm drift.    PROCEDURE SUMMARY:   The following muscles were identified after palpating for landmarks and the overlying skin was cleansed with alcohol. These muscles were then injected using a 30 guage- half  inch needle with the following doses of onabotulinumtoxin A. A 5 unit/ 0.1 ml dilution was used for the injections. A 2 x 100 unit vial was used.    1. Corrugators 5 units each side.  2. Procerus 5 units.  3. Frontalis 10 units each side.  4. Temporalis 20 units each side.  5. Occipitalis 15 units each side.  6. Paraspinals 10 units each side.  7. Trapezius 15 units each side.    Units Injected: 155 Units  Units Discarded: 45 Units  Lot Number and Expiration: [id number]; 9/2023    The patient tolerated the procedure without any immediate complaints and will call the Neurology clinic if she has any problems or side effects from the medication. The patient will follow up in the Neurology clinic as previously decided.      Edi Rojo MD  Neurologist  [facility] Neurology [facility]  [phone]

## 2022-04-29 NOTE — NURSING NOTE
Chief Complaint   Patient presents with     Botox     botox     Michael Antony MA on 4/29/2022 at 9:03 AM

## 2022-06-29 ENCOUNTER — TELEPHONE (OUTPATIENT)
Dept: NEUROLOGY | Facility: CLINIC | Age: 47
End: 2022-06-29

## 2022-06-29 NOTE — TELEPHONE ENCOUNTER
Spoke with pt and got her scheduled for 9/29/22 at 0950 for follow up appt.     Priya Angulo RN on 6/29/2022 at 1:39 PM

## 2022-06-29 NOTE — TELEPHONE ENCOUNTER
[facility] Call Center    Phone Message    May a detailed message be left on voicemail: yes     Reason for Call: Other: patient cancelled her return appt today with Dr. Rojo because she was not feeling well. Has a botox appt scheduled for 7/29. Please notify patient if she needs a return appt before her botox on 7/29.    Action Taken: Message routed to:  [facility] neurology    Travel Screening: Not Applicable

## 2022-07-29 ENCOUNTER — OFFICE VISIT (OUTPATIENT)
Dept: NEUROLOGY | Facility: CLINIC | Age: 47
End: 2022-07-29
Payer: COMMERCIAL

## 2022-07-29 VITALS
WEIGHT: 227 LBS | BODY MASS INDEX: 37.82 KG/M2 | DIASTOLIC BLOOD PRESSURE: 74 MMHG | HEIGHT: 65 IN | HEART RATE: 86 BPM | SYSTOLIC BLOOD PRESSURE: 126 MMHG

## 2022-07-29 DIAGNOSIS — G43.719 INTRACTABLE CHRONIC MIGRAINE WITHOUT AURA AND WITHOUT STATUS MIGRAINOSUS: Primary | ICD-10-CM

## 2022-07-29 PROCEDURE — 64615 CHEMODENERV MUSC MIGRAINE: CPT | Performed by: PSYCHIATRY & NEUROLOGY

## 2022-07-29 NOTE — NURSING NOTE
Chief Complaint   Patient presents with     Botox     Jelani Ashley CMA@ on 7/29/2022 at 9:04 AM

## 2022-07-29 NOTE — LETTER
7/29/2022         RE: Efra Ames  [address] [address]        Dear Colleague,    Thank you for referring your patient, Efra Ames, to the [facility] NEUROLOGY CLINIC [place]. Please see a copy of my visit note below.    See procedure note.       Again, thank you for allowing me to participate in the care of your patient.        Sincerely,        Edi Rojo MD

## 2022-07-29 NOTE — PROCEDURES
NEUROLOGY PROCEDURE NOTE  Jul 29, 2022      Chronic migraine Botox injection    CONSENT: The procedure was explained to the patient. The risks and benefits of the procedure and the alternatives were discussed with the patient. The patient was given an opportunity to ask any questions she had about the procedure. A verbal consent was obtained from the patient. A written consent is available in the chart.    PRE-PROCEDURE  Diagnosis: Chronic migraine  Headache frequency before the use of Botox:- 25 headache days per month  Headache frequency with Botox use:-40% improvement after 2 Botox (15-18 headaches days but lasting fewer hours and less severe); Headaches worse this time due to allergies but still better in severity    EXAM  GENERAL: Healthy appearing, alert, no acute distress, normal habitus.  NEUROLOGICAL:  Patient is alert with fluent language.  Extraocular movements are intact.  Facial movements are present and symmetric.  No arm drift.    PROCEDURE SUMMARY:   The following muscles were identified after palpating for landmarks and the overlying skin was cleansed with alcohol. These muscles were then injected using a 30 guage- half  inch needle with the following doses of onabotulinumtoxin A. A 5 unit/ 0.1 ml dilution was used for the injections. A 2 x 100 unit vial was used.    1. Corrugators 5 units each side.  2. Procerus 5 units.  3. Frontalis 10 units each side.  4. Temporalis 20 units each side.  5. Occipitalis 15 units each side.  6. Paraspinals 10 units each side.  7. Trapezius 15 units each side.    Units Injected: 155 Units  Units Discarded: 45 Units  Lot Number and Expiration: [id number]; 6/2024    The patient tolerated the procedure without any immediate complaints and will call the Neurology clinic if she has any problems or side effects from the medication. The patient will follow up in the Neurology clinic as previously decided.      Edi Rojo MD  Neurologist  [facility] Neurology  [facility]  [phone]

## 2022-09-29 ENCOUNTER — OFFICE VISIT (OUTPATIENT)
Dept: NEUROLOGY | Facility: CLINIC | Age: 47
End: 2022-09-29
Payer: COMMERCIAL

## 2022-09-29 VITALS
SYSTOLIC BLOOD PRESSURE: 105 MMHG | HEART RATE: 104 BPM | DIASTOLIC BLOOD PRESSURE: 68 MMHG | WEIGHT: 128 LBS | BODY MASS INDEX: 21.3 KG/M2

## 2022-09-29 DIAGNOSIS — G43.719 INTRACTABLE CHRONIC MIGRAINE WITHOUT AURA AND WITHOUT STATUS MIGRAINOSUS: Primary | ICD-10-CM

## 2022-09-29 PROCEDURE — 99214 OFFICE O/P EST MOD 30 MIN: CPT | Performed by: PSYCHIATRY & NEUROLOGY

## 2022-09-29 RX ORDER — ELETRIPTAN HYDROBROMIDE 40 MG/1
40 TABLET, FILM COATED ORAL
Qty: 18 TABLET | Refills: 1 | Status: SHIPPED | OUTPATIENT
Start: 2022-09-29 | End: 2023-04-03

## 2022-09-29 NOTE — LETTER
9/29/2022         RE: Efra Ames  [address] [address]        Dear Colleague,    Thank you for referring your patient, Efra Ames, to the [facility] NEUROLOGY CLINIC [place]. Please see a copy of my visit note below.    NEUROLOGY OUTPATIENT PROGRESS NOTE   Sep 29, 2022     CHIEF COMPLAINT/REASON FOR VISIT/REASON FOR CONSULT  Patient presents with:  Headache: Follow-up   Last migraine; this Tuesday     REASON FOR CONSULTATION- Headaches    REFERRAL SOURCE  Dr. Guerra  CC Dr. Guerra    HISTORY OF PRESENT ILLNESS  Efra Ames is a 47 year old female seen today for evaluation of headaches. She reports that she has been having headaches since age 12. Previously was seen by Dr. Cuadra. Headaches have been 20 days a month. She was put on Ajovy by Dr. Guerra which reduce the headaches to 10 days a month but currently she is switching insurances and this has been stopped.    Headaches are generally bitemporal. They can also be in the frontal region. Sometimes it is in the middle of the head. Headaches are more of a pressure and occasional throbbing. There is occasional visual auras. She reports associated photophobia phonophobia and nausea. Occasionally will get dizziness and even fall down with the headaches. Denies any clear triggers. Does have mental illness which can affect the headaches. She will get difficulty with thinking when she has a bad headache.    Patient is tried Topamax which she did not tolerate has also been on propanolol which she did not tolerate. Has not tried nortriptyline or amitriptyline. Has been on multiple antidepressants for her depression and anxiety. Currently does not want to add more antidepressants due to weight gain issues and being on multiple antidepressants. For abortive therapy she has been on Zomig which did not work. Has also tried Imitrex which did not work. Has tried over-the-counter Tylenol and ibuprofen without benefit.    10/25/21  Patient  returns today.  She reports that she continues to have 25 headache days a month.  She is interested in doing the Botox today but is also interested in trying the Ajovy 1 more time.  She is comfortable with the needles but feels that the Parminder was helping and works in a different mechanism.  Ajovy was stopped last year because the insurance would not cover it anymore.  She reports that the headaches had improved with Ajovy but not completely.  Had questions about the Maxalt.  Headaches have been lasting multiple days.  Headache does become more tolerable after she takes Maxalt for day 1 and wonders if she can take it on day 2 or not.  Discussed with her about taking the Maxalt as soon as she can and repeating it in 2 hours.  She can take the Maxalt the next day the discussed that it might not be as effective as it was on day 1.  She reports no other new issues.    12/27/21  Patient returns today.  She previously was having 25 headache days a month but now is having 16-18 headache days a month.  Feels that the Botox has reduced the edge and headaches are less severe.  Continues to have headaches on the right side.  Occasionally will get the visual auras.    Is on Maxalt but feels that it occasionally will not work.  Is not using anything with the Maxalt.  Does not feel she has enough medication for the whole month.  Is interested in trying new medications.  Given that she gets limited supply of Maxalt she cannot predict which headaches are going to be severe enough that she wants to use the Maxalt or not.  Does daily using the Maxalt.    We discussed the Ajovy and it was decided that we will hold off on that for right now since she is trying the Botox and trying to medications at the same time might not be very beneficial.    9/29/22  To distribute Botox was done 2 months ago.  She has had 4 sessions of Botox.  Reports that the headaches are 3-4 times a week but she is not having 6 to 7 days of continuous headaches.   Headaches can last the full day.  She generally uses Nurtec, ibuprofen or Maxalt in various combinations depending on the severity of her headaches.  Is interested in trying other medications to see if the headaches can be under better control.  Botox with each session is making things better.  Denies any side effects to the Botox.  No other new symptoms.    Previous history is reviewed and this is unchanged.    PAST MEDICAL/SURGICAL HISTORY  No past medical history on file.  Patient Active Problem List   Diagnosis     Allergic rhinitis     Anisometropia     Anxiety state     Carpal tunnel syndrome     Classical migraine with intractable migraine     Gluten intolerance     History of strabismus surgery     Hypotropia of left eye     Insomnia     Migraine headache     Mild persistent asthma     Moderate episode of recurrent major depressive disorder (H)     Pre-diabetes     Morbid obesity (H)   Significant depression, anxiety, prediabetes, gluten intolerance, high cholesterol    FAMILY HISTORY  Family History   Problem Relation Age of Onset     Migraines Mother    Positive for mother and grandmother with migraines. Daughter with migraines    SOCIAL HISTORY  Social History     Tobacco Use     Smoking status: Never Smoker     Smokeless tobacco: Never Used   Substance Use Topics     Alcohol use: Not Currently       SYSTEMS REVIEW  Twelve-system ROS was done and other than the HPI this was negative except for numbness and tingling, balance coordination problems, dizziness, memory loss, anxiety, depression, respiratory problems/asthma.  No new issues.    MEDICATIONS  albuterol (PROAIR HFA/PROVENTIL HFA/VENTOLIN HFA) 108 (90 Base) MCG/ACT inhaler, Inhale 2 puffs into the lungs  amphetamine-dextroamphetamine (ADDERALL XR) 20 MG 24 hr capsule, Take 40 mg by mouth   atorvastatin (LIPITOR) 20 MG tablet, Take 20 mg by mouth   cetirizine (ZYRTEC) 10 MG tablet, Take 10-20 mg by mouth  cholecalciferol 50 MCG (2000 UT) CAPS, 3  daily  FLUoxetine (PROZAC) 20 MG capsule, Take 60 mg by mouth   fluticasone-salmeterol (ADVAIR HFA) 230-21 MCG/ACT inhaler, Inhale 1 puff into the lungs  Rimegepant Sulfate (NURTEC) 75 MG TBDP, Take 1 tablet by mouth daily as needed (Migraine) Repeat in 2 hours if needed  rizatriptan (MAXALT) 10 MG tablet, Take 1 tablet (10 mg) by mouth at onset of headache for migraine May repeat in 2 hours.  traZODone (DESYREL) 100 MG tablet, Take 100 mg by mouth  montelukast (SINGULAIR) 10 MG tablet, Take 10 mg by mouth (Patient not taking: No sig reported)  sertraline (ZOLOFT) 100 MG tablet,   Specialty Vitamins Products (VITAMINS FOR HAIR) TABS, Take 1 tablet by mouth (Patient not taking: No sig reported)    Botulinum Toxin Type A (BOTOX) 200 units injection 155 Units         PHYSICAL EXAMINATION  VITALS: /68   Pulse 104   Wt 58.1 kg (128 lb)   BMI 21.30 kg/m    GENERAL: Healthy appearing, alert, no acute distress, normal habitus.  CARDIOVASCULAR: Extremities warm and well perfused. Pulses present.   NEUROLOGICAL:  Patient is awake and grossly oriented to self, place and time.  Attention span is normal.  Memory is grossly intact.  Language is fluent and follows commands appropriately.  Appropriate fund of knowledge. Cranial nerves 2-12 are intact. There is no pronator drift.  Motor exam shows 5/5 strength in all extremities.  Tone is symmetric bilaterally in upper and lower extremities.  (Previously reflexes are symmetric and 2+ in upper extremities and lower extremities. Sensory exam is grossly intact to light touch, pin prick and vibration.) Finger to nose and heel to shin is without dysmetria.  Romberg is negative.  Gait is normal and the patient is able to do tandem walk and walk on toes and heels.  Exam similar to before.    DIAGNOSTICS  RELEVANT LABS  Recent BMP shows elevated creatinine of 1.21.  TSH normal in the past.    OUTSIDE RECORDS  Notes from Dr. Guerra reviewed. Pertinent information is noted in the HPI.      MRI brain-images reviewed with patient.  She does have low-lying cerebellar tonsils though these are very minimal.  No major structural lesions noted.  IMPRESSION:  1.  No evidence of an acute intracranial abnormality.  2.  Low-lying cerebellar tonsils as above.    IMPRESSION/REPORT/PLAN  Intractable chronic migraine without aura and without status migrainosus  Obesity  Concurrent use of multiple antidepressants  Low-lying cerebellar tonsils    This is a 47 year old female with headache suggestive of chronic migraines that are refractory to multiple medications.  MRI brain was negative for structural lesions.  Does show low-lying cerebellar tonsils though most likely these are asymptomatic.  Discussed significance of this with the patient. She has tried Topamax, propanolol, Ajovy without any significant benefit. Ajovy did provide partial benefit though did not completely take care of the headaches.  At this point Ajovy is no longer covered through insurance.  We will hold off on trying Ajovy since Botox is being tried right now.  She has had 50% reduction in headaches and 40% improvement in severity.  Headaches are getting better and better with each session of Botox.  Discussed about trying a higher dose of Botox to see if the Botox can last longer and provide more benefit and we will try to get approval from the insurance.    For abortive therapy she is tried Imitrex and Zomig without any benefit. Over-the-counter medications are not helpful. She finds benefit with opioids but due to risk of addiction we do not want to use that. Fiorinal did not help.  Maxalt, Nurtec, ibuprofen combination has been somewhat helpful.  We will try Relpax on the days that she does not use the Maxalt to see if that provides even more benefit.       Encourage her to keep a log of her headaches.  I can see her back in 6 months.    -    Ibuprofen 400 mg initially and then Maxalt in 1 hour.  -     Rimegepant Sulfate (NURTEC) 75 MG  "TBDP; Take 1 tablet by mouth daily as needed (Migraine) Repeat in 2 hours if needed  -     rizatriptan (MAXALT) 10 MG tablet; Take 1 tablet (10 mg) by mouth at onset of headache for migraine May repeat in 2 hours.  -     eletriptan (RELPAX) 40 MG tablet; Take 1 tablet (40 mg) by mouth at onset of headache for migraine May repeat in 2 hours. Max 2 tablets/24 hours.  -     Botulinum Toxin Type A (BOTOX) 200 units injection 200 Units    Return in about 6 months (around 3/29/2023) for In-Clinic Visit (must).     Botox PA  \" The patient has a diagnosis of chronic migraines. She has more than 15 headache days a month with each headache lasting at least 4 hours despite use of triptans. Her headaches have been there for over 6 months. She has been screened for medication overuse headaches and she does not have that. She has tried Topamax, Propranolol for 3 months without any significant benefit. Antidepressants cannot be added since she is on multiple antidepressants already. She has tried Ajovy with minimal success. I would request that the Botox be approved for her.    She has had about 50% improvement with 155 units of Botox.  I will request a higher dose of Botox to be approved to see if she can get even further benefit.  \"    Over 34 minutes were spent coordinating the care for the patient on the day of the encounter.  This includes previsit, during visit and post visit activities as documented above.  Counseling patient.  Refractory problem.  Prescription management.  (Activities include but not inclusive of reviewing chart, reviewing outside records, reviewing labs and imaging study results as well as the images, patient visit time including getting history and exam,  use if applicable, review of test results with the patient and coming up with a plan in a shared model, counseling patient and family, education and answering patient questions, EMR , EMR diagnosis entry and problem list " management, medication reconciliation and prescription management if applicable, paperwork if applicable, printing documents and documentation of the visit activities.)        Edi Rojo MD  Neurologist  [facility]  Tel:- [phone]    This note was dictated using voice recognition software.  Any grammatical or context distortions are unintentional and inherent to the software.        Again, thank you for allowing me to participate in the care of your patient.        Sincerely,        Edi Rojo MD

## 2022-09-29 NOTE — PROGRESS NOTES
NEUROLOGY OUTPATIENT PROGRESS NOTE   Sep 29, 2022     CHIEF COMPLAINT/REASON FOR VISIT/REASON FOR CONSULT  Patient presents with:  Headache: Follow-up   Last migraine; this Tuesday     REASON FOR CONSULTATION- Headaches    REFERRAL SOURCE  Dr. Guerra  CC Dr. Guerra    HISTORY OF PRESENT ILLNESS  Efra Ames is a 47 year old female seen today for evaluation of headaches. She reports that she has been having headaches since age 12. Previously was seen by Dr. Cuadra. Headaches have been 20 days a month. She was put on Ajovy by Dr. Guerra which reduce the headaches to 10 days a month but currently she is switching insurances and this has been stopped.    Headaches are generally bitemporal. They can also be in the frontal region. Sometimes it is in the middle of the head. Headaches are more of a pressure and occasional throbbing. There is occasional visual auras. She reports associated photophobia phonophobia and nausea. Occasionally will get dizziness and even fall down with the headaches. Denies any clear triggers. Does have mental illness which can affect the headaches. She will get difficulty with thinking when she has a bad headache.    Patient is tried Topamax which she did not tolerate has also been on propanolol which she did not tolerate. Has not tried nortriptyline or amitriptyline. Has been on multiple antidepressants for her depression and anxiety. Currently does not want to add more antidepressants due to weight gain issues and being on multiple antidepressants. For abortive therapy she has been on Zomig which did not work. Has also tried Imitrex which did not work. Has tried over-the-counter Tylenol and ibuprofen without benefit.    10/25/21  Patient returns today.  She reports that she continues to have 25 headache days a month.  She is interested in doing the Botox today but is also interested in trying the Ajovy 1 more time.  She is comfortable with the needles but feels that the Parminder was helping and works in a  different mechanism.  Ajovy was stopped last year because the insurance would not cover it anymore.  She reports that the headaches had improved with Ajovy but not completely.  Had questions about the Maxalt.  Headaches have been lasting multiple days.  Headache does become more tolerable after she takes Maxalt for day 1 and wonders if she can take it on day 2 or not.  Discussed with her about taking the Maxalt as soon as she can and repeating it in 2 hours.  She can take the Maxalt the next day the discussed that it might not be as effective as it was on day 1.  She reports no other new issues.    12/27/21  Patient returns today.  She previously was having 25 headache days a month but now is having 16-18 headache days a month.  Feels that the Botox has reduced the edge and headaches are less severe.  Continues to have headaches on the right side.  Occasionally will get the visual auras.    Is on Maxalt but feels that it occasionally will not work.  Is not using anything with the Maxalt.  Does not feel she has enough medication for the whole month.  Is interested in trying new medications.  Given that she gets limited supply of Maxalt she cannot predict which headaches are going to be severe enough that she wants to use the Maxalt or not.  Does daily using the Maxalt.    We discussed the Ajovy and it was decided that we will hold off on that for right now since she is trying the Botox and trying to medications at the same time might not be very beneficial.    9/29/22  To distribute Botox was done 2 months ago.  She has had 4 sessions of Botox.  Reports that the headaches are 3-4 times a week but she is not having 6 to 7 days of continuous headaches.  Headaches can last the full day.  She generally uses Nurtec, ibuprofen or Maxalt in various combinations depending on the severity of her headaches.  Is interested in trying other medications to see if the headaches can be under better control.  Botox with each session  is making things better.  Denies any side effects to the Botox.  No other new symptoms.    Previous history is reviewed and this is unchanged.    PAST MEDICAL/SURGICAL HISTORY  No past medical history on file.  Patient Active Problem List   Diagnosis     Allergic rhinitis     Anisometropia     Anxiety state     Carpal tunnel syndrome     Classical migraine with intractable migraine     Gluten intolerance     History of strabismus surgery     Hypotropia of left eye     Insomnia     Migraine headache     Mild persistent asthma     Moderate episode of recurrent major depressive disorder (H)     Pre-diabetes     Morbid obesity (H)   Significant depression, anxiety, prediabetes, gluten intolerance, high cholesterol    FAMILY HISTORY  Family History   Problem Relation Age of Onset     Migraines Mother    Positive for mother and grandmother with migraines. Daughter with migraines    SOCIAL HISTORY  Social History     Tobacco Use     Smoking status: Never Smoker     Smokeless tobacco: Never Used   Substance Use Topics     Alcohol use: Not Currently       SYSTEMS REVIEW  Twelve-system ROS was done and other than the HPI this was negative except for numbness and tingling, balance coordination problems, dizziness, memory loss, anxiety, depression, respiratory problems/asthma.  No new issues.    MEDICATIONS  albuterol (PROAIR HFA/PROVENTIL HFA/VENTOLIN HFA) 108 (90 Base) MCG/ACT inhaler, Inhale 2 puffs into the lungs  amphetamine-dextroamphetamine (ADDERALL XR) 20 MG 24 hr capsule, Take 40 mg by mouth   atorvastatin (LIPITOR) 20 MG tablet, Take 20 mg by mouth   cetirizine (ZYRTEC) 10 MG tablet, Take 10-20 mg by mouth  cholecalciferol 50 MCG (2000 UT) CAPS, 3 daily  FLUoxetine (PROZAC) 20 MG capsule, Take 60 mg by mouth   fluticasone-salmeterol (ADVAIR HFA) 230-21 MCG/ACT inhaler, Inhale 1 puff into the lungs  Rimegepant Sulfate (NURTEC) 75 MG TBDP, Take 1 tablet by mouth daily as needed (Migraine) Repeat in 2 hours if  needed  rizatriptan (MAXALT) 10 MG tablet, Take 1 tablet (10 mg) by mouth at onset of headache for migraine May repeat in 2 hours.  traZODone (DESYREL) 100 MG tablet, Take 100 mg by mouth  montelukast (SINGULAIR) 10 MG tablet, Take 10 mg by mouth (Patient not taking: No sig reported)  sertraline (ZOLOFT) 100 MG tablet,   Specialty Vitamins Products (VITAMINS FOR HAIR) TABS, Take 1 tablet by mouth (Patient not taking: No sig reported)    Botulinum Toxin Type A (BOTOX) 200 units injection 155 Units         PHYSICAL EXAMINATION  VITALS: /68   Pulse 104   Wt 58.1 kg (128 lb)   BMI 21.30 kg/m    GENERAL: Healthy appearing, alert, no acute distress, normal habitus.  CARDIOVASCULAR: Extremities warm and well perfused. Pulses present.   NEUROLOGICAL:  Patient is awake and grossly oriented to self, place and time.  Attention span is normal.  Memory is grossly intact.  Language is fluent and follows commands appropriately.  Appropriate fund of knowledge. Cranial nerves 2-12 are intact. There is no pronator drift.  Motor exam shows 5/5 strength in all extremities.  Tone is symmetric bilaterally in upper and lower extremities.  (Previously reflexes are symmetric and 2+ in upper extremities and lower extremities. Sensory exam is grossly intact to light touch, pin prick and vibration.) Finger to nose and heel to shin is without dysmetria.  Romberg is negative.  Gait is normal and the patient is able to do tandem walk and walk on toes and heels.  Exam similar to before.    DIAGNOSTICS  RELEVANT LABS  Recent BMP shows elevated creatinine of 1.21.  TSH normal in the past.    OUTSIDE RECORDS  Notes from Dr. Guerra reviewed. Pertinent information is noted in the HPI.     MRI brain-images reviewed with patient.  She does have low-lying cerebellar tonsils though these are very minimal.  No major structural lesions noted.  IMPRESSION:  1.  No evidence of an acute intracranial abnormality.  2.  Low-lying cerebellar tonsils as  above.    IMPRESSION/REPORT/PLAN  Intractable chronic migraine without aura and without status migrainosus  Obesity  Concurrent use of multiple antidepressants  Low-lying cerebellar tonsils    This is a 47 year old female with headache suggestive of chronic migraines that are refractory to multiple medications.  MRI brain was negative for structural lesions.  Does show low-lying cerebellar tonsils though most likely these are asymptomatic.  Discussed significance of this with the patient. She has tried Topamax, propanolol, Ajovy without any significant benefit. Ajovy did provide partial benefit though did not completely take care of the headaches.  At this point Ajovy is no longer covered through insurance.  We will hold off on trying Ajovy since Botox is being tried right now.  She has had 50% reduction in headaches and 40% improvement in severity.  Headaches are getting better and better with each session of Botox.  Discussed about trying a higher dose of Botox to see if the Botox can last longer and provide more benefit and we will try to get approval from the insurance.    For abortive therapy she is tried Imitrex and Zomig without any benefit. Over-the-counter medications are not helpful. She finds benefit with opioids but due to risk of addiction we do not want to use that. Fiorinal did not help.  Maxalt, Nurtec, ibuprofen combination has been somewhat helpful.  We will try Relpax on the days that she does not use the Maxalt to see if that provides even more benefit.       Encourage her to keep a log of her headaches.  I can see her back in 6 months.    -    Ibuprofen 400 mg initially and then Maxalt in 1 hour.  -     Rimegepant Sulfate (NURTEC) 75 MG TBDP; Take 1 tablet by mouth daily as needed (Migraine) Repeat in 2 hours if needed  -     rizatriptan (MAXALT) 10 MG tablet; Take 1 tablet (10 mg) by mouth at onset of headache for migraine May repeat in 2 hours.  -     eletriptan (RELPAX) 40 MG tablet; Take 1  "tablet (40 mg) by mouth at onset of headache for migraine May repeat in 2 hours. Max 2 tablets/24 hours.  -     Botulinum Toxin Type A (BOTOX) 200 units injection 200 Units    Return in about 6 months (around 3/29/2023) for In-Clinic Visit (must).     Botox PA  \" The patient has a diagnosis of chronic migraines. She has more than 15 headache days a month with each headache lasting at least 4 hours despite use of triptans. Her headaches have been there for over 6 months. She has been screened for medication overuse headaches and she does not have that. She has tried Topamax, Propranolol for 3 months without any significant benefit. Antidepressants cannot be added since she is on multiple antidepressants already. She has tried Ajovy with minimal success. I would request that the Botox be approved for her.    She has had about 50% improvement with 155 units of Botox.  I will request a higher dose of Botox to be approved to see if she can get even further benefit.  \"    Over 34 minutes were spent coordinating the care for the patient on the day of the encounter.  This includes previsit, during visit and post visit activities as documented above.  Counseling patient.  Refractory problem.  Prescription management.  (Activities include but not inclusive of reviewing chart, reviewing outside records, reviewing labs and imaging study results as well as the images, patient visit time including getting history and exam,  use if applicable, review of test results with the patient and coming up with a plan in a shared model, counseling patient and family, education and answering patient questions, EMR , EMR diagnosis entry and problem list management, medication reconciliation and prescription management if applicable, paperwork if applicable, printing documents and documentation of the visit activities.)        Edi Rojo MD  Neurologist  [facility]  Tel:- " [phone]    This note was dictated using voice recognition software.  Any grammatical or context distortions are unintentional and inherent to the software.

## 2022-10-23 ENCOUNTER — HEALTH MAINTENANCE LETTER (OUTPATIENT)
Age: 47
End: 2022-10-23

## 2022-10-28 ENCOUNTER — TRANSCRIBE ORDERS (OUTPATIENT)
Dept: OTHER | Age: 47
End: 2022-10-28

## 2022-10-28 ENCOUNTER — OFFICE VISIT (OUTPATIENT)
Dept: NEUROLOGY | Facility: CLINIC | Age: 47
End: 2022-10-28
Payer: COMMERCIAL

## 2022-10-28 VITALS
DIASTOLIC BLOOD PRESSURE: 88 MMHG | HEART RATE: 100 BPM | SYSTOLIC BLOOD PRESSURE: 120 MMHG | BODY MASS INDEX: 37.77 KG/M2 | WEIGHT: 227 LBS | RESPIRATION RATE: 18 BRPM

## 2022-10-28 DIAGNOSIS — G43.719 INTRACTABLE CHRONIC MIGRAINE WITHOUT AURA AND WITHOUT STATUS MIGRAINOSUS: Primary | ICD-10-CM

## 2022-10-28 PROCEDURE — 64615 CHEMODENERV MUSC MIGRAINE: CPT | Performed by: PSYCHIATRY & NEUROLOGY

## 2022-10-28 NOTE — LETTER
10/28/2022         RE: Efra Ames  [address] [address]        Dear Colleague,    Thank you for referring your patient, Efra Ames, to the [facility] [place]. Please see a copy of my visit note below.    See procedure note.       Again, thank you for allowing me to participate in the care of your patient.        Sincerely,        Edi Rojo MD

## 2022-10-28 NOTE — PROCEDURES
NEUROLOGY PROCEDURE NOTE  Oct 28, 2022      Chronic migraine Botox injection    CONSENT: The procedure was explained to the patient. The risks and benefits of the procedure and the alternatives were discussed with the patient. The patient was given an opportunity to ask any questions she had about the procedure. A verbal consent was obtained from the patient. A written consent is available in the chart.    PRE-PROCEDURE  Diagnosis: Chronic migraine  Headache frequency before the use of Botox:- 25 headache days per month  Headache frequency with Botox use:- around 50% improvement    EXAM  GENERAL: Healthy appearing, alert, no acute distress, normal habitus.  NEUROLOGICAL:  Patient is alert with fluent language.  Extraocular movements are intact.  Facial movements are present and symmetric.  No arm drift.    PROCEDURE SUMMARY:   The following muscles were identified after palpating for landmarks and the overlying skin was cleansed with alcohol. These muscles were then injected using a 30 guage- half  inch needle with the following doses of onabotulinumtoxin A. A 5 unit/ 0.1 ml dilution was used for the injections. A 2 x 100 unit vial was used.    1. Corrugators 5 units each side.  2. Procerus 5 units.  3. Frontalis 10 units each side.  4. Temporalis 25 units each side.  5. Occipitalis 15 units each side.  6. Paraspinals 10 units each side.  7. Trapezius 25 units each side.    Units Injected: 185 Units  Units Discarded: 15 Units  Lot Number and Expiration: [id number]; 11/2024    The patient tolerated the procedure without any immediate complaints and will call the Neurology clinic if she has any problems or side effects from the medication. The patient will follow up in the Neurology clinic as previously decided.      Edi Rojo MD  Neurologist  [facility]  [phone]

## 2022-11-30 ENCOUNTER — MYC MEDICAL ADVICE (OUTPATIENT)
Dept: NEUROLOGY | Facility: CLINIC | Age: 47
End: 2022-11-30

## 2023-01-30 ENCOUNTER — OFFICE VISIT (OUTPATIENT)
Dept: NEUROLOGY | Facility: CLINIC | Age: 48
End: 2023-01-30
Payer: COMMERCIAL

## 2023-01-30 ENCOUNTER — TELEPHONE (OUTPATIENT)
Dept: NEUROLOGY | Facility: CLINIC | Age: 48
End: 2023-01-30

## 2023-01-30 VITALS
BODY MASS INDEX: 37.77 KG/M2 | SYSTOLIC BLOOD PRESSURE: 117 MMHG | WEIGHT: 227 LBS | DIASTOLIC BLOOD PRESSURE: 84 MMHG | RESPIRATION RATE: 16 BRPM | HEART RATE: 96 BPM

## 2023-01-30 DIAGNOSIS — G43.719 INTRACTABLE CHRONIC MIGRAINE WITHOUT AURA AND WITHOUT STATUS MIGRAINOSUS: ICD-10-CM

## 2023-01-30 PROCEDURE — 64615 CHEMODENERV MUSC MIGRAINE: CPT | Performed by: PSYCHIATRY & NEUROLOGY

## 2023-01-30 RX ORDER — DULOXETIN HYDROCHLORIDE 60 MG/1
60 CAPSULE, DELAYED RELEASE ORAL
COMMUNITY
Start: 2022-12-20

## 2023-01-30 RX ORDER — RIZATRIPTAN BENZOATE 10 MG/1
10 TABLET ORAL
Qty: 18 TABLET | Refills: 11 | Status: SHIPPED | OUTPATIENT
Start: 2023-01-30 | End: 2024-03-26

## 2023-01-30 RX ORDER — RIMEGEPANT SULFATE 75 MG/75MG
75 TABLET, ORALLY DISINTEGRATING ORAL DAILY PRN
Qty: 12 TABLET | Refills: 11 | Status: SHIPPED | OUTPATIENT
Start: 2023-01-30 | End: 2023-04-03

## 2023-01-30 NOTE — TELEPHONE ENCOUNTER
Patient presents to clinic for Botox appointment. She was called and a voicemail left by our Botox PA team notifying her that an updated referral from primary was needed. She did not call back and is now at the clinic wanting her botox. We can proceed with a signed waiver as patient is requesting.   Dr. Rojo - Please validate medical necessity for the Botox.

## 2023-01-30 NOTE — LETTER
1/30/2023         RE: Efra Ames  [address] [address]        Dear Colleague,    Thank you for referring your patient, fEra Ames, to the [facility] [place]. Please see a copy of my visit note below.    See procedure note.       Again, thank you for allowing me to participate in the care of your patient.        Sincerely,        Edi Rojo MD

## 2023-01-30 NOTE — PROCEDURES
NEUROLOGY PROCEDURE NOTE  Jan 30, 2023      Chronic migraine Botox injection    CONSENT: The procedure was explained to the patient. The risks and benefits of the procedure and the alternatives were discussed with the patient. The patient was given an opportunity to ask any questions she had about the procedure. A verbal consent was obtained from the patient. A written consent is available in the chart.    PRE-PROCEDURE  Diagnosis: Chronic migraine  Headache frequency before the use of Botox:- 25 headache days per month  Headache frequency with Botox use:- 70% improvement in first 2 months, some wearing off in last 3 weeks.     EXAM  GENERAL: Healthy appearing, alert, no acute distress, normal habitus.  NEUROLOGICAL:  Patient is alert with fluent language.  Extraocular movements are intact.  Facial movements are present and symmetric.  No arm drift.    PROCEDURE SUMMARY:   The following muscles were identified after palpating for landmarks and the overlying skin was cleansed with alcohol. These muscles were then injected using a 30 guage- half  inch needle with the following doses of onabotulinumtoxin A. A 5 unit/ 0.1 ml dilution was used for the injections. A 2 x 100 unit vial was used.    1. Corrugators 5 units each side.  2. Procerus 5 units.  3. Frontalis 10 units each side.  4. Temporalis 25 units each side.  5. Occipitalis 15 units each side.  6. Paraspinals 10 units each side.  7. Trapezius 25 units each side.    Units Injected: 185 Units  Units Discarded: 15 Units  Lot Number and Expiration: [id number]; 03/2025    The patient tolerated the procedure without any immediate complaints and will call the Neurology clinic if she has any problems or side effects from the medication. The patient will follow up in the Neurology clinic as previously decided.      Edi Rojo MD  Neurologist  [facility]  [phone]

## 2023-04-03 ENCOUNTER — OFFICE VISIT (OUTPATIENT)
Dept: NEUROLOGY | Facility: CLINIC | Age: 48
End: 2023-04-03
Payer: COMMERCIAL

## 2023-04-03 VITALS
SYSTOLIC BLOOD PRESSURE: 118 MMHG | BODY MASS INDEX: 37.77 KG/M2 | DIASTOLIC BLOOD PRESSURE: 86 MMHG | WEIGHT: 227 LBS | HEART RATE: 100 BPM | RESPIRATION RATE: 18 BRPM

## 2023-04-03 DIAGNOSIS — G43.719 INTRACTABLE CHRONIC MIGRAINE WITHOUT AURA AND WITHOUT STATUS MIGRAINOSUS: ICD-10-CM

## 2023-04-03 PROCEDURE — 99214 OFFICE O/P EST MOD 30 MIN: CPT | Performed by: PSYCHIATRY & NEUROLOGY

## 2023-04-03 RX ORDER — RIMEGEPANT SULFATE 75 MG/75MG
75 TABLET, ORALLY DISINTEGRATING ORAL
Qty: 15 TABLET | Refills: 11 | Status: SHIPPED | OUTPATIENT
Start: 2023-04-03 | End: 2023-04-28

## 2023-04-03 NOTE — PROGRESS NOTES
NEUROLOGY OUTPATIENT PROGRESS NOTE   Apr 3, 2023     CHIEF COMPLAINT/REASON FOR VISIT/REASON FOR CONSULT  Patient presents with:  Follow Up    REASON FOR CONSULTATION- Headaches    REFERRAL SOURCE  Dr. Guerra  CC Dr. Guerra    HISTORY OF PRESENT ILLNESS  Efra Ames is a 48 year old female seen today for evaluation of headaches. She reports that she has been having headaches since age 12. Previously was seen by Dr. Cuadra. Headaches have been 20 days a month. She was put on Ajovy by Dr. Guerra which reduce the headaches to 10 days a month but currently she is switching insurances and this has been stopped.    Headaches are generally bitemporal. They can also be in the frontal region. Sometimes it is in the middle of the head. Headaches are more of a pressure and occasional throbbing. There is occasional visual auras. She reports associated photophobia phonophobia and nausea. Occasionally will get dizziness and even fall down with the headaches. Denies any clear triggers. Does have mental illness which can affect the headaches. She will get difficulty with thinking when she has a bad headache.    Patient is tried Topamax which she did not tolerate has also been on propanolol which she did not tolerate. Has not tried nortriptyline or amitriptyline. Has been on multiple antidepressants for her depression and anxiety. Currently does not want to add more antidepressants due to weight gain issues and being on multiple antidepressants. For abortive therapy she has been on Zomig which did not work. Has also tried Imitrex which did not work. Has tried over-the-counter Tylenol and ibuprofen without benefit.    10/25/21  Patient returns today.  She reports that she continues to have 25 headache days a month.  She is interested in doing the Botox today but is also interested in trying the Ajovy 1 more time.  She is comfortable with the needles but feels that the Parminder was helping and works in a different mechanism.  Ajovy was stopped  last year because the insurance would not cover it anymore.  She reports that the headaches had improved with Ajovy but not completely.  Had questions about the Maxalt.  Headaches have been lasting multiple days.  Headache does become more tolerable after she takes Maxalt for day 1 and wonders if she can take it on day 2 or not.  Discussed with her about taking the Maxalt as soon as she can and repeating it in 2 hours.  She can take the Maxalt the next day the discussed that it might not be as effective as it was on day 1.  She reports no other new issues.    12/27/21  Patient returns today.  She previously was having 25 headache days a month but now is having 16-18 headache days a month.  Feels that the Botox has reduced the edge and headaches are less severe.  Continues to have headaches on the right side.  Occasionally will get the visual auras.    Is on Maxalt but feels that it occasionally will not work.  Is not using anything with the Maxalt.  Does not feel she has enough medication for the whole month.  Is interested in trying new medications.  Given that she gets limited supply of Maxalt she cannot predict which headaches are going to be severe enough that she wants to use the Maxalt or not.  Does daily using the Maxalt.    We discussed the Ajovy and it was decided that we will hold off on that for right now since she is trying the Botox and trying to medications at the same time might not be very beneficial.    9/29/22  To distribute Botox was done 2 months ago.  She has had 4 sessions of Botox.  Reports that the headaches are 3-4 times a week but she is not having 6 to 7 days of continuous headaches.  Headaches can last the full day.  She generally uses Nurtec, ibuprofen or Maxalt in various combinations depending on the severity of her headaches.  Is interested in trying other medications to see if the headaches can be under better control.  Botox with each session is making things better.  Denies any  side effects to the Botox.  No other new symptoms.    4/3/23  Patient returns today.  She has had about 6 sessions of Botox.  The higher dose of Botox has been more effective.  She is having 5-6 bad headaches in the month.  Does have minor headaches couple of times a week.  Is using ibuprofen for those.  Nurtec works the best for abortive therapy.  Relpax has not been so effective.  Maxalt has been more effective.  Wants to continue with the Maxalt.  No other new symptoms.  Headaches are much less severe compared to before with the Botox though continues to be in the same frequency.  Wants to try the Botox for few more sessions.    Previous history is reviewed and this is unchanged.    PAST MEDICAL/SURGICAL HISTORY  History reviewed. No pertinent past medical history.  Patient Active Problem List   Diagnosis     Allergic rhinitis     Anisometropia     Anxiety state     Carpal tunnel syndrome     Classical migraine with intractable migraine     Gluten intolerance     History of strabismus surgery     Hypotropia of left eye     Insomnia     Migraine headache     Mild persistent asthma     Moderate episode of recurrent major depressive disorder (H)     Pre-diabetes     Morbid obesity (H)   Significant depression, anxiety, prediabetes, gluten intolerance, high cholesterol    FAMILY HISTORY  Family History   Problem Relation Age of Onset     Migraines Mother    Positive for mother and grandmother with migraines. Daughter with migraines    SOCIAL HISTORY  Social History     Tobacco Use     Smoking status: Never     Smokeless tobacco: Never   Substance Use Topics     Alcohol use: Not Currently       SYSTEMS REVIEW  Twelve-system ROS was done and other than the HPI this was negative except for numbness and tingling, balance coordination problems, dizziness, memory loss, anxiety, depression, respiratory problems/asthma.  No new concerns/issues.    MEDICATIONS  albuterol (PROAIR HFA/PROVENTIL HFA/VENTOLIN HFA) 108 (90 Base)  MCG/ACT inhaler, Inhale 2 puffs into the lungs  atorvastatin (LIPITOR) 20 MG tablet, Take 20 mg by mouth   cetirizine (ZYRTEC) 10 MG tablet, Take 10-20 mg by mouth  cholecalciferol 50 MCG (2000 UT) CAPS, 3 daily  DULoxetine (CYMBALTA) 60 MG capsule, Take 60 mg by mouth  FLUoxetine (PROZAC) 20 MG capsule, Take 40 mg by mouth  fluticasone-salmeterol (ADVAIR HFA) 230-21 MCG/ACT inhaler, Inhale 1 puff into the lungs  montelukast (SINGULAIR) 10 MG tablet, Take 10 mg by mouth  rizatriptan (MAXALT) 10 MG tablet, Take 1 tablet (10 mg) by mouth at onset of headache for migraine May repeat in 2 hours.  sertraline (ZOLOFT) 100 MG tablet,   Specialty Vitamins Products (VITAMINS FOR HAIR) TABS, Take 1 tablet by mouth  traZODone (DESYREL) 100 MG tablet, Take 100 mg by mouth  amphetamine-dextroamphetamine (ADDERALL XR) 20 MG 24 hr capsule, Take 40 mg by mouth  (Patient not taking: Reported on 1/30/2023)    Botulinum Toxin Type A (BOTOX) 200 units injection 200 Units         PHYSICAL EXAMINATION  VITALS: /86   Pulse 100   Resp 18   Wt 103 kg (227 lb)   BMI 37.77 kg/m    GENERAL: Healthy appearing, alert, no acute distress, normal habitus.  CARDIOVASCULAR: Extremities warm and well perfused. Pulses present.   NEUROLOGICAL:  Patient is awake and grossly oriented to self, place and time.  Attention span is normal.  Memory is grossly intact.  Language is fluent and follows commands appropriately.  Appropriate fund of knowledge. Cranial nerves 2-12 are intact. There is no pronator drift.  Motor exam shows 5/5 strength in all extremities.  Tone is symmetric bilaterally in upper and lower extremities.  (Previously reflexes are symmetric and 2+ in upper extremities and lower extremities. Sensory exam is grossly intact to light touch, pin prick and vibration.) Finger to nose and heel to shin is without dysmetria.  Romberg is negative.  Gait is normal and the patient is able to do tandem walk and walk on toes and heels.  Exam  similar to before.    DIAGNOSTICS  RELEVANT LABS  Recent BMP shows elevated creatinine of 1.21.  TSH normal in the past.    OUTSIDE RECORDS  Notes from Dr. Guerra reviewed. Pertinent information is noted in the HPI.     MRI brain-images reviewed with patient.  She does have low-lying cerebellar tonsils though these are very minimal.  No major structural lesions noted.  IMPRESSION:  1.  No evidence of an acute intracranial abnormality.  2.  Low-lying cerebellar tonsils as above.    IMPRESSION/REPORT/PLAN  Intractable chronic migraine without aura and without status migrainosus  Obesity  Concurrent use of multiple antidepressants  Low-lying cerebellar tonsils    This is a 48 year old female with headache suggestive of chronic migraines that are refractory to multiple medications.  MRI brain was negative for structural lesions.  Does show low-lying cerebellar tonsils though most likely these are asymptomatic.  Discussed significance of this with the patient. She has tried Topamax, propanolol, Ajovy without any significant benefit. Ajovy did provide partial benefit though did not completely take care of the headaches.  At this point Ajovy is no longer covered through insurance.  We will hold off on trying Ajovy since Botox is being tried right now.  She has had 60% reduction in headaches and 70% improvement in severity.  Headaches are getting better and better with each session of Botox.  We will continue the higher dose of Botox since headaches are much better.    We will switch the Nurtec from abortive to preventive therapy to see if it can provide even more benefit since the Botox by itself is not completely effective.    For abortive therapy she is tried Imitrex and Zomig without any benefit. Over-the-counter medications are not helpful. She finds benefit with opioids but due to risk of addiction we do not want to use that. Fiorinal did not help.  Maxalt, Nurtec, ibuprofen combination has been somewhat helpful.  Relpax  "was tried which was not effective.  We will switch from Nurtec abortive to preventative.     Encourage her to keep a log of her headaches.  Return back in 6 months.    -    Ibuprofen 400 mg initially and then Maxalt in 1 hour.  -     rizatriptan (MAXALT) 10 MG tablet; Take 1 tablet (10 mg) by mouth at onset of headache for migraine May repeat in 2 hours.  -     Botulinum Toxin Type A (BOTOX) 200 units injection 200 Units  -     rimegepant (NURTEC) 75 MG ODT tablet; Take 1 tablet (75 mg) by mouth every 48 hours Repeat in 2 hours if needed    Return in about 6 months (around 10/3/2023) for In-Clinic Visit (must).     Botox PA  \" The patient has a diagnosis of chronic migraines. She has more than 15 headache days a month with each headache lasting at least 4 hours despite use of triptans. Her headaches have been there for over 6 months. She has been screened for medication overuse headaches and she does not have that. She has tried Topamax, Propranolol for 3 months without any significant benefit. Antidepressants cannot be added since she is on multiple antidepressants already. She has tried Ajovy with minimal success. I would request that the Botox be approved for her.    She has had about 50% improvement with 155 units of Botox.  I will request a higher dose of Botox to be approved to see if she can get even further benefit.  60-70% improvement with the higher dose of Botox.  \"    Over 30 minutes were spent coordinating the care for the patient on the day of the encounter.  This includes previsit, during visit and post visit activities as documented above.  Counseling patient.  Prescription management.  Refractory problem  (Activities include but not inclusive of reviewing chart, reviewing outside records, reviewing labs and imaging study results as well as the images, patient visit time including getting history and exam,  use if applicable, review of test results with the patient and coming up with a " plan in a shared model, counseling patient and family, education and answering patient questions, EMR , EMR diagnosis entry and problem list management, medication reconciliation and prescription management if applicable, paperwork if applicable, printing documents and documentation of the visit activities.)        Edi Rojo MD  Neurologist  [facility]  Tel:- [phone]    This note was dictated using voice recognition software.  Any grammatical or context distortions are unintentional and inherent to the software.

## 2023-04-03 NOTE — LETTER
4/3/2023         RE: Efra Ames  [address] [address]        Dear Colleague,    Thank you for referring your patient, Efra Ames, to the [facility] NEUROLOGY CLINIC [place]. Please see a copy of my visit note below.    NEUROLOGY OUTPATIENT PROGRESS NOTE   Apr 3, 2023     CHIEF COMPLAINT/REASON FOR VISIT/REASON FOR CONSULT  Patient presents with:  Follow Up    REASON FOR CONSULTATION- Headaches    REFERRAL SOURCE  Dr. Guerra  CC Dr. Guerra    HISTORY OF PRESENT ILLNESS  Efra Ames is a 48 year old female seen today for evaluation of headaches. She reports that she has been having headaches since age 12. Previously was seen by Dr. Cuadra. Headaches have been 20 days a month. She was put on Ajovy by Dr. Guerra which reduce the headaches to 10 days a month but currently she is switching insurances and this has been stopped.    Headaches are generally bitemporal. They can also be in the frontal region. Sometimes it is in the middle of the head. Headaches are more of a pressure and occasional throbbing. There is occasional visual auras. She reports associated photophobia phonophobia and nausea. Occasionally will get dizziness and even fall down with the headaches. Denies any clear triggers. Does have mental illness which can affect the headaches. She will get difficulty with thinking when she has a bad headache.    Patient is tried Topamax which she did not tolerate has also been on propanolol which she did not tolerate. Has not tried nortriptyline or amitriptyline. Has been on multiple antidepressants for her depression and anxiety. Currently does not want to add more antidepressants due to weight gain issues and being on multiple antidepressants. For abortive therapy she has been on Zomig which did not work. Has also tried Imitrex which did not work. Has tried over-the-counter Tylenol and ibuprofen without benefit.    10/25/21  Patient returns today.  She reports that she continues  to have 25 headache days a month.  She is interested in doing the Botox today but is also interested in trying the Ajovy 1 more time.  She is comfortable with the needles but feels that the Parminder was helping and works in a different mechanism.  Ajovy was stopped last year because the insurance would not cover it anymore.  She reports that the headaches had improved with Ajovy but not completely.  Had questions about the Maxalt.  Headaches have been lasting multiple days.  Headache does become more tolerable after she takes Maxalt for day 1 and wonders if she can take it on day 2 or not.  Discussed with her about taking the Maxalt as soon as she can and repeating it in 2 hours.  She can take the Maxalt the next day the discussed that it might not be as effective as it was on day 1.  She reports no other new issues.    12/27/21  Patient returns today.  She previously was having 25 headache days a month but now is having 16-18 headache days a month.  Feels that the Botox has reduced the edge and headaches are less severe.  Continues to have headaches on the right side.  Occasionally will get the visual auras.    Is on Maxalt but feels that it occasionally will not work.  Is not using anything with the Maxalt.  Does not feel she has enough medication for the whole month.  Is interested in trying new medications.  Given that she gets limited supply of Maxalt she cannot predict which headaches are going to be severe enough that she wants to use the Maxalt or not.  Does daily using the Maxalt.    We discussed the Ajovy and it was decided that we will hold off on that for right now since she is trying the Botox and trying to medications at the same time might not be very beneficial.    9/29/22  To distribute Botox was done 2 months ago.  She has had 4 sessions of Botox.  Reports that the headaches are 3-4 times a week but she is not having 6 to 7 days of continuous headaches.  Headaches can last the full day.  She generally  uses Nurtec, ibuprofen or Maxalt in various combinations depending on the severity of her headaches.  Is interested in trying other medications to see if the headaches can be under better control.  Botox with each session is making things better.  Denies any side effects to the Botox.  No other new symptoms.    4/3/23  Patient returns today.  She has had about 6 sessions of Botox.  The higher dose of Botox has been more effective.  She is having 5-6 bad headaches in the month.  Does have minor headaches couple of times a week.  Is using ibuprofen for those.  Nurtec works the best for abortive therapy.  Relpax has not been so effective.  Maxalt has been more effective.  Wants to continue with the Maxalt.  No other new symptoms.  Headaches are much less severe compared to before with the Botox though continues to be in the same frequency.  Wants to try the Botox for few more sessions.    Previous history is reviewed and this is unchanged.    PAST MEDICAL/SURGICAL HISTORY  History reviewed. No pertinent past medical history.  Patient Active Problem List   Diagnosis     Allergic rhinitis     Anisometropia     Anxiety state     Carpal tunnel syndrome     Classical migraine with intractable migraine     Gluten intolerance     History of strabismus surgery     Hypotropia of left eye     Insomnia     Migraine headache     Mild persistent asthma     Moderate episode of recurrent major depressive disorder (H)     Pre-diabetes     Morbid obesity (H)   Significant depression, anxiety, prediabetes, gluten intolerance, high cholesterol    FAMILY HISTORY  Family History   Problem Relation Age of Onset     Migraines Mother    Positive for mother and grandmother with migraines. Daughter with migraines    SOCIAL HISTORY  Social History     Tobacco Use     Smoking status: Never     Smokeless tobacco: Never   Substance Use Topics     Alcohol use: Not Currently       SYSTEMS REVIEW  Twelve-system ROS was done and other than the HPI this  was negative except for numbness and tingling, balance coordination problems, dizziness, memory loss, anxiety, depression, respiratory problems/asthma.  No new concerns/issues.    MEDICATIONS  albuterol (PROAIR HFA/PROVENTIL HFA/VENTOLIN HFA) 108 (90 Base) MCG/ACT inhaler, Inhale 2 puffs into the lungs  atorvastatin (LIPITOR) 20 MG tablet, Take 20 mg by mouth   cetirizine (ZYRTEC) 10 MG tablet, Take 10-20 mg by mouth  cholecalciferol 50 MCG (2000 UT) CAPS, 3 daily  DULoxetine (CYMBALTA) 60 MG capsule, Take 60 mg by mouth  FLUoxetine (PROZAC) 20 MG capsule, Take 40 mg by mouth  fluticasone-salmeterol (ADVAIR HFA) 230-21 MCG/ACT inhaler, Inhale 1 puff into the lungs  montelukast (SINGULAIR) 10 MG tablet, Take 10 mg by mouth  rizatriptan (MAXALT) 10 MG tablet, Take 1 tablet (10 mg) by mouth at onset of headache for migraine May repeat in 2 hours.  sertraline (ZOLOFT) 100 MG tablet,   Specialty Vitamins Products (VITAMINS FOR HAIR) TABS, Take 1 tablet by mouth  traZODone (DESYREL) 100 MG tablet, Take 100 mg by mouth  amphetamine-dextroamphetamine (ADDERALL XR) 20 MG 24 hr capsule, Take 40 mg by mouth  (Patient not taking: Reported on 1/30/2023)    Botulinum Toxin Type A (BOTOX) 200 units injection 200 Units         PHYSICAL EXAMINATION  VITALS: /86   Pulse 100   Resp 18   Wt 103 kg (227 lb)   BMI 37.77 kg/m    GENERAL: Healthy appearing, alert, no acute distress, normal habitus.  CARDIOVASCULAR: Extremities warm and well perfused. Pulses present.   NEUROLOGICAL:  Patient is awake and grossly oriented to self, place and time.  Attention span is normal.  Memory is grossly intact.  Language is fluent and follows commands appropriately.  Appropriate fund of knowledge. Cranial nerves 2-12 are intact. There is no pronator drift.  Motor exam shows 5/5 strength in all extremities.  Tone is symmetric bilaterally in upper and lower extremities.  (Previously reflexes are symmetric and 2+ in upper extremities and lower  extremities. Sensory exam is grossly intact to light touch, pin prick and vibration.) Finger to nose and heel to shin is without dysmetria.  Romberg is negative.  Gait is normal and the patient is able to do tandem walk and walk on toes and heels.  Exam similar to before.    DIAGNOSTICS  RELEVANT LABS  Recent BMP shows elevated creatinine of 1.21.  TSH normal in the past.    OUTSIDE RECORDS  Notes from Dr. Guerra reviewed. Pertinent information is noted in the HPI.     MRI brain-images reviewed with patient.  She does have low-lying cerebellar tonsils though these are very minimal.  No major structural lesions noted.  IMPRESSION:  1.  No evidence of an acute intracranial abnormality.  2.  Low-lying cerebellar tonsils as above.    IMPRESSION/REPORT/PLAN  Intractable chronic migraine without aura and without status migrainosus  Obesity  Concurrent use of multiple antidepressants  Low-lying cerebellar tonsils    This is a 48 year old female with headache suggestive of chronic migraines that are refractory to multiple medications.  MRI brain was negative for structural lesions.  Does show low-lying cerebellar tonsils though most likely these are asymptomatic.  Discussed significance of this with the patient. She has tried Topamax, propanolol, Ajovy without any significant benefit. Ajovy did provide partial benefit though did not completely take care of the headaches.  At this point Ajovy is no longer covered through insurance.  We will hold off on trying Ajovy since Botox is being tried right now.  She has had 60% reduction in headaches and 70% improvement in severity.  Headaches are getting better and better with each session of Botox.  We will continue the higher dose of Botox since headaches are much better.    We will switch the Nurtec from abortive to preventive therapy to see if it can provide even more benefit since the Botox by itself is not completely effective.    For abortive therapy she is tried Imitrex and  "Zomig without any benefit. Over-the-counter medications are not helpful. She finds benefit with opioids but due to risk of addiction we do not want to use that. Fiorinal did not help.  Maxalt, Nurtec, ibuprofen combination has been somewhat helpful.  Relpax was tried which was not effective.  We will switch from Nurtec abortive to preventative.     Encourage her to keep a log of her headaches.  Return back in 6 months.    -    Ibuprofen 400 mg initially and then Maxalt in 1 hour.  -     rizatriptan (MAXALT) 10 MG tablet; Take 1 tablet (10 mg) by mouth at onset of headache for migraine May repeat in 2 hours.  -     Botulinum Toxin Type A (BOTOX) 200 units injection 200 Units  -     rimegepant (NURTEC) 75 MG ODT tablet; Take 1 tablet (75 mg) by mouth every 48 hours Repeat in 2 hours if needed    Return in about 6 months (around 10/3/2023) for In-Clinic Visit (must).     Botox PA  \" The patient has a diagnosis of chronic migraines. She has more than 15 headache days a month with each headache lasting at least 4 hours despite use of triptans. Her headaches have been there for over 6 months. She has been screened for medication overuse headaches and she does not have that. She has tried Topamax, Propranolol for 3 months without any significant benefit. Antidepressants cannot be added since she is on multiple antidepressants already. She has tried Ajovy with minimal success. I would request that the Botox be approved for her.    She has had about 50% improvement with 155 units of Botox.  I will request a higher dose of Botox to be approved to see if she can get even further benefit.  60-70% improvement with the higher dose of Botox.  \"    Over 30 minutes were spent coordinating the care for the patient on the day of the encounter.  This includes previsit, during visit and post visit activities as documented above.  Counseling patient.  Prescription management.  Refractory problem  (Activities include but not inclusive of " reviewing chart, reviewing outside records, reviewing labs and imaging study results as well as the images, patient visit time including getting history and exam,  use if applicable, review of test results with the patient and coming up with a plan in a shared model, counseling patient and family, education and answering patient questions, EMR , EMR diagnosis entry and problem list management, medication reconciliation and prescription management if applicable, paperwork if applicable, printing documents and documentation of the visit activities.)        Edi Rojo MD  Neurologist  [facility]  Tel:- [phone]    This note was dictated using voice recognition software.  Any grammatical or context distortions are unintentional and inherent to the software.        Again, thank you for allowing me to participate in the care of your patient.        Sincerely,        Edi Rojo MD

## 2023-04-28 ENCOUNTER — OFFICE VISIT (OUTPATIENT)
Dept: NEUROLOGY | Facility: CLINIC | Age: 48
End: 2023-04-28
Payer: COMMERCIAL

## 2023-04-28 VITALS
HEIGHT: 65 IN | DIASTOLIC BLOOD PRESSURE: 81 MMHG | SYSTOLIC BLOOD PRESSURE: 118 MMHG | HEART RATE: 97 BPM | BODY MASS INDEX: 39.49 KG/M2 | WEIGHT: 237 LBS

## 2023-04-28 DIAGNOSIS — G43.719 INTRACTABLE CHRONIC MIGRAINE WITHOUT AURA AND WITHOUT STATUS MIGRAINOSUS: ICD-10-CM

## 2023-04-28 PROCEDURE — 64615 CHEMODENERV MUSC MIGRAINE: CPT | Performed by: PSYCHIATRY & NEUROLOGY

## 2023-04-28 RX ORDER — RIMEGEPANT SULFATE 75 MG/75MG
75 TABLET, ORALLY DISINTEGRATING ORAL
Qty: 15 TABLET | Refills: 11 | Status: SHIPPED | OUTPATIENT
Start: 2023-04-28 | End: 2024-03-26

## 2023-04-28 NOTE — LETTER
4/28/2023         RE: Efra Ames  [address] [facility] [address]        Dear Colleague,    Thank you for referring your patient, Efra Ames, to the [facility] NEUROLOGY CLINIC [place]. Please see a copy of my visit note below.    See procedure note.           Again, thank you for allowing me to participate in the care of your patient.        Sincerely,        Edi Rojo MD

## 2023-04-28 NOTE — NURSING NOTE
Chief Complaint   Patient presents with     Migraine     Botox     Kary Deras on 4/28/2023 at 9:59 AM

## 2023-04-28 NOTE — PROCEDURES
NEUROLOGY PROCEDURE NOTE  Apr 28, 2023      Chronic migraine Botox injection    CONSENT: The procedure was explained to the patient. The risks and benefits of the procedure and the alternatives were discussed with the patient. The patient was given an opportunity to ask any questions she had about the procedure. A verbal consent was obtained from the patient. A written consent is available in the chart.    PRE-PROCEDURE  Diagnosis: Chronic migraine  Headache frequency before the use of Botox:- 25 headache days per month  Headache frequency with Botox use:- 70% improvement in first 2 months, some wearing off in last 3 weeks.     EXAM  GENERAL: Healthy appearing, alert, no acute distress, normal habitus.  NEUROLOGICAL:  Patient is alert with fluent language.  Extraocular movements are intact.  Facial movements are present and symmetric.  No arm drift.    PROCEDURE SUMMARY:   The following muscles were identified after palpating for landmarks and the overlying skin was cleansed with alcohol. These muscles were then injected using a 30 guage- half  inch needle with the following doses of onabotulinumtoxin A. A 5 unit/ 0.1 ml dilution was used for the injections. A 2 x 100 unit vial was used.    1. Corrugators 5 units each side.  2. Procerus 5 units.  3. Frontalis 10 units each side.  4. Temporalis 25 units each side.  5. Occipitalis 15 units each side.  6. Paraspinals 10 units each side.  7. Trapezius 30 units each side.    Units Injected: 195 Units  Units Discarded: 5 Units  Lot Number and Expiration: [id number]; 09/2025    The patient tolerated the procedure without any immediate complaints and will call the Neurology clinic if she has any problems or side effects from the medication. The patient will follow up in the Neurology clinic as previously decided.      Edi Rojo MD  Neurologist  [facility]  [phone]

## 2023-07-28 ENCOUNTER — OFFICE VISIT (OUTPATIENT)
Dept: NEUROLOGY | Facility: CLINIC | Age: 48
End: 2023-07-28
Payer: COMMERCIAL

## 2023-07-28 VITALS
DIASTOLIC BLOOD PRESSURE: 90 MMHG | RESPIRATION RATE: 16 BRPM | WEIGHT: 237 LBS | BODY MASS INDEX: 39.44 KG/M2 | HEART RATE: 100 BPM | SYSTOLIC BLOOD PRESSURE: 131 MMHG

## 2023-07-28 DIAGNOSIS — G43.719 INTRACTABLE CHRONIC MIGRAINE WITHOUT AURA AND WITHOUT STATUS MIGRAINOSUS: Primary | ICD-10-CM

## 2023-07-28 PROCEDURE — 64615 CHEMODENERV MUSC MIGRAINE: CPT | Performed by: PSYCHIATRY & NEUROLOGY

## 2023-07-28 NOTE — PROCEDURES
NEUROLOGY PROCEDURE NOTE  Jul 28, 2023      Chronic migraine Botox injection    CONSENT: The procedure was explained to the patient. The risks and benefits of the procedure and the alternatives were discussed with the patient. The patient was given an opportunity to ask any questions she had about the procedure. A verbal consent was obtained from the patient. A written consent is available in the chart.    PRE-PROCEDURE  Diagnosis: Chronic migraine  Headache frequency before the use of Botox:- 25 headache days per month  Headache frequency with Botox use:- 70-80% improvement in first 2 months, some wearing off in last 3 weeks.     EXAM  GENERAL: Healthy appearing, alert, no acute distress, normal habitus.  NEUROLOGICAL:  Patient is alert with fluent language.  Extraocular movements are intact.  Facial movements are present and symmetric.  No arm drift.    PROCEDURE SUMMARY:   The following muscles were identified after palpating for landmarks and the overlying skin was cleansed with alcohol. These muscles were then injected using a 30 guage- half  inch needle with the following doses of onabotulinumtoxin A. A 5 unit/ 0.1 ml dilution was used for the injections. A 2 x 100 unit vial was used.    Corrugators 5 units each side.  Procerus 5 units.  Frontalis 10 units each side.  Temporalis 25 units each side.  Occipitalis 15 units each side.  Paraspinals 10 units each side.  Trapezius 30 units each side.    Units Injected: 195 Units  Units Discarded: 5 Units  Lot Number and Expiration: [id number]; 11/2025    The patient tolerated the procedure without any immediate complaints and will call the Neurology clinic if she has any problems or side effects from the medication. The patient will follow up in the Neurology clinic as previously decided.      Edi Rojo MD  Neurologist  [facility]  [phone]

## 2023-07-28 NOTE — LETTER
7/28/2023         RE: Efra Ames  [address]  [id number]        Dear Colleague,    Thank you for referring your patient, Efra Ames, to the [facility] NEUROLOGY CLINIC [place]. Please see a copy of my visit note below.    See procedure note.       Again, thank you for allowing me to participate in the care of your patient.        Sincerely,        Edi Rojo MD

## 2023-10-31 ENCOUNTER — OFFICE VISIT (OUTPATIENT)
Dept: NEUROLOGY | Facility: CLINIC | Age: 48
End: 2023-10-31
Payer: COMMERCIAL

## 2023-10-31 VITALS
SYSTOLIC BLOOD PRESSURE: 121 MMHG | WEIGHT: 237 LBS | DIASTOLIC BLOOD PRESSURE: 94 MMHG | HEART RATE: 108 BPM | RESPIRATION RATE: 16 BRPM | BODY MASS INDEX: 39.44 KG/M2

## 2023-10-31 DIAGNOSIS — G43.719 INTRACTABLE CHRONIC MIGRAINE WITHOUT AURA AND WITHOUT STATUS MIGRAINOSUS: Primary | ICD-10-CM

## 2023-10-31 DIAGNOSIS — E66.01 MORBID OBESITY (H): ICD-10-CM

## 2023-10-31 PROCEDURE — 64615 CHEMODENERV MUSC MIGRAINE: CPT | Performed by: PSYCHIATRY & NEUROLOGY

## 2023-10-31 PROCEDURE — 99214 OFFICE O/P EST MOD 30 MIN: CPT | Mod: 25 | Performed by: PSYCHIATRY & NEUROLOGY

## 2023-10-31 RX ORDER — VERAPAMIL HYDROCHLORIDE 120 MG/1
120 CAPSULE, EXTENDED RELEASE ORAL AT BEDTIME
Qty: 90 CAPSULE | Refills: 1 | Status: SHIPPED | OUTPATIENT
Start: 2023-10-31 | End: 2024-01-31

## 2023-10-31 NOTE — PROCEDURES
NEUROLOGY PROCEDURE NOTE  Oct 31, 2023      Chronic migraine Botox injection    CONSENT: The procedure was explained to the patient. The risks and benefits of the procedure and the alternatives were discussed with the patient. The patient was given an opportunity to ask any questions she had about the procedure. A verbal consent was obtained from the patient. A written consent is available in the chart.    PRE-PROCEDURE  Diagnosis: Chronic migraine  Headache frequency before the use of Botox:- 25 headache days per month  Headache frequency with Botox use:- 70-80% improvement in first 2 months, some wearing off in last 3 weeks.     EXAM  GENERAL: Healthy appearing, alert, no acute distress, normal habitus.  NEUROLOGICAL:  Patient is alert with fluent language.  Extraocular movements are intact.  Facial movements are present and symmetric.  No arm drift.    PROCEDURE SUMMARY:   The following muscles were identified after palpating for landmarks and the overlying skin was cleansed with alcohol. These muscles were then injected using a 30 guage- half  inch needle with the following doses of onabotulinumtoxin A. A 5 unit/ 0.1 ml dilution was used for the injections. A 2 x 100 unit vial was used.    Corrugators 5 units each side.  Procerus 5 units.  Frontalis 10 units each side.  Temporalis 25 units each side.  Occipitalis 15 units each side.  Paraspinals 10 units each side.  Trapezius 30 units each side.    Units Injected: 195 Units  Units Discarded: 5 Units  Lot Number and Expiration: [id number]; 3/2025    The patient tolerated the procedure without any immediate complaints and will call the Neurology clinic if she has any problems or side effects from the medication. The patient will follow up in the Neurology clinic as previously decided.      Edi Rojo MD  Neurologist  [facility]  [phone]

## 2023-10-31 NOTE — PROGRESS NOTES
NEUROLOGY OUTPATIENT PROGRESS NOTE   Oct 31, 2023     CHIEF COMPLAINT/REASON FOR VISIT/REASON FOR CONSULT  Patient presents with:  Botox    REASON FOR CONSULTATION- Headaches    REFERRAL SOURCE  Dr. Guerra  CC Dr. Guerra    HISTORY OF PRESENT ILLNESS  Efra Ames is a 48 year old female seen today for evaluation of headaches. She reports that she has been having headaches since age 12. Previously was seen by Dr. Cuadra. Headaches have been 20 days a month. She was put on Ajovy by Dr. Guerra which reduce the headaches to 10 days a month but currently she is switching insurances and this has been stopped.    Headaches are generally bitemporal. They can also be in the frontal region. Sometimes it is in the middle of the head. Headaches are more of a pressure and occasional throbbing. There is occasional visual auras. She reports associated photophobia phonophobia and nausea. Occasionally will get dizziness and even fall down with the headaches. Denies any clear triggers. Does have mental illness which can affect the headaches. She will get difficulty with thinking when she has a bad headache.    Patient is tried Topamax which she did not tolerate has also been on propanolol which she did not tolerate. Has not tried nortriptyline or amitriptyline. Has been on multiple antidepressants for her depression and anxiety. Currently does not want to add more antidepressants due to weight gain issues and being on multiple antidepressants. For abortive therapy she has been on Zomig which did not work. Has also tried Imitrex which did not work. Has tried over-the-counter Tylenol and ibuprofen without benefit.    10/25/21  Patient returns today.  She reports that she continues to have 25 headache days a month.  She is interested in doing the Botox today but is also interested in trying the Ajovy 1 more time.  She is comfortable with the needles but feels that the Parminder was helping and works in a different mechanism.  Ajovy was stopped last  year because the insurance would not cover it anymore.  She reports that the headaches had improved with Ajovy but not completely.  Had questions about the Maxalt.  Headaches have been lasting multiple days.  Headache does become more tolerable after she takes Maxalt for day 1 and wonders if she can take it on day 2 or not.  Discussed with her about taking the Maxalt as soon as she can and repeating it in 2 hours.  She can take the Maxalt the next day the discussed that it might not be as effective as it was on day 1.  She reports no other new issues.    12/27/21  Patient returns today.  She previously was having 25 headache days a month but now is having 16-18 headache days a month.  Feels that the Botox has reduced the edge and headaches are less severe.  Continues to have headaches on the right side.  Occasionally will get the visual auras.    Is on Maxalt but feels that it occasionally will not work.  Is not using anything with the Maxalt.  Does not feel she has enough medication for the whole month.  Is interested in trying new medications.  Given that she gets limited supply of Maxalt she cannot predict which headaches are going to be severe enough that she wants to use the Maxalt or not.  Does daily using the Maxalt.    We discussed the Ajovy and it was decided that we will hold off on that for right now since she is trying the Botox and trying to medications at the same time might not be very beneficial.    9/29/22  To distribute Botox was done 2 months ago.  She has had 4 sessions of Botox.  Reports that the headaches are 3-4 times a week but she is not having 6 to 7 days of continuous headaches.  Headaches can last the full day.  She generally uses Nurtec, ibuprofen or Maxalt in various combinations depending on the severity of her headaches.  Is interested in trying other medications to see if the headaches can be under better control.  Botox with each session is making things better.  Denies any side  effects to the Botox.  No other new symptoms.    4/3/23  Patient returns today.  She has had about 6 sessions of Botox.  The higher dose of Botox has been more effective.  She is having 5-6 bad headaches in the month.  Does have minor headaches couple of times a week.  Is using ibuprofen for those.  Nurtec works the best for abortive therapy.  Relpax has not been so effective.  Maxalt has been more effective.  Wants to continue with the Maxalt.  No other new symptoms.  Headaches are much less severe compared to before with the Botox though continues to be in the same frequency.  Wants to try the Botox for few more sessions.    10/31/23  Patient returns today.  With the last session of Botox she had headaches every day but these are much less severe compared to before.  More tolerable.  Nurtec is also helping her preventive medication.  Maxalt does work as abortive therapy.  Wants to continue the current plan.  Discussed about adding other preventive medications and willing to try verapamil.  Topamax and propanolol have been used in the past.  No other new concerns.  No other provoking factors other than the weather.    Previous history is reviewed and this is unchanged.    PAST MEDICAL/SURGICAL HISTORY  History reviewed. No pertinent past medical history.  Patient Active Problem List   Diagnosis    Allergic rhinitis    Anisometropia    Anxiety state    Carpal tunnel syndrome    Classical migraine with intractable migraine    Gluten intolerance    History of strabismus surgery    Hypotropia of left eye    Insomnia    Migraine headache    Mild persistent asthma    Moderate episode of recurrent major depressive disorder (H)    Pre-diabetes    Morbid obesity (H)   Significant depression, anxiety, prediabetes, gluten intolerance, high cholesterol    FAMILY HISTORY  Family History   Problem Relation Age of Onset    Migraines Mother    Positive for mother and grandmother with migraines. Daughter with migraines    SOCIAL  HISTORY  Social History     Tobacco Use    Smoking status: Never    Smokeless tobacco: Never   Substance Use Topics    Alcohol use: Not Currently       SYSTEMS REVIEW  Twelve-system ROS was done and other than the HPI this was negative except for numbness and tingling, balance coordination problems, dizziness, memory loss, anxiety, depression, respiratory problems/asthma.  No new symptoms/issues.    MEDICATIONS  albuterol (PROAIR HFA/PROVENTIL HFA/VENTOLIN HFA) 108 (90 Base) MCG/ACT inhaler, Inhale 2 puffs into the lungs  amphetamine-dextroamphetamine (ADDERALL XR) 20 MG 24 hr capsule, Take 40 mg by mouth  atorvastatin (LIPITOR) 20 MG tablet, Take 20 mg by mouth   cetirizine (ZYRTEC) 10 MG tablet, Take 10-20 mg by mouth  cholecalciferol 50 MCG (2000 UT) CAPS, 3 daily  DULoxetine (CYMBALTA) 60 MG capsule, Take 60 mg by mouth  FLUoxetine (PROZAC) 20 MG capsule, Take 40 mg by mouth  fluticasone-salmeterol (ADVAIR HFA) 230-21 MCG/ACT inhaler, Inhale 1 puff into the lungs  montelukast (SINGULAIR) 10 MG tablet, Take 10 mg by mouth  rimegepant (NURTEC) 75 MG ODT tablet, Take 1 tablet (75 mg) by mouth every 48 hours  rizatriptan (MAXALT) 10 MG tablet, Take 1 tablet (10 mg) by mouth at onset of headache for migraine May repeat in 2 hours.  sertraline (ZOLOFT) 100 MG tablet,   Specialty Vitamins Products (VITAMINS FOR HAIR) TABS, Take 1 tablet by mouth  traZODone (DESYREL) 100 MG tablet, Take 100 mg by mouth    No current facility-administered medications on file prior to visit.       PHYSICAL EXAMINATION  VITALS: BP (!) 121/94   Pulse 108   Resp 16   Wt 107.5 kg (237 lb)   BMI 39.44 kg/m    GENERAL: Healthy appearing, alert, no acute distress, normal habitus.  CARDIOVASCULAR: Extremities warm and well perfused. Pulses present.   NEUROLOGICAL:  Patient is awake and grossly oriented to self, place and time.  Attention span is normal.  Memory is grossly intact.  Language is fluent and follows commands appropriately.   Appropriate fund of knowledge. Cranial nerves 2-12 are intact. There is no pronator drift.  Motor exam shows 5/5 strength in all extremities.  Tone is symmetric bilaterally in upper and lower extremities.  (Previously reflexes are symmetric and 2+ in upper extremities and lower extremities. Sensory exam is grossly intact to light touch, pin prick and vibration.) Finger to nose and heel to shin is without dysmetria.  Romberg is negative.  Gait is normal and the patient is able to do tandem walk and walk on toes and heels.  Exam stable.    DIAGNOSTICS  RELEVANT LABS  Recent BMP shows elevated creatinine of 1.21.  TSH normal in the past.    OUTSIDE RECORDS  Notes from Dr. Guerra reviewed. Pertinent information is noted in the HPI.     MRI brain-images reviewed with patient.  She does have low-lying cerebellar tonsils though these are very minimal.  No major structural lesions noted.  IMPRESSION:  1.  No evidence of an acute intracranial abnormality.  2.  Low-lying cerebellar tonsils as above.    IMPRESSION/REPORT/PLAN  Intractable chronic migraine without aura and without status migrainosus  Obesity  Concurrent use of multiple antidepressants  Low-lying cerebellar tonsils    This is a 48 year old female with headache suggestive of chronic migraines that are refractory to multiple medications.  MRI brain was negative for structural lesions.  Does show low-lying cerebellar tonsils though most likely these are asymptomatic.  Discussed significance of this with the patient. She has tried Topamax, propanolol, Ajovy without any significant benefit. Ajovy did provide partial benefit though did not completely take care of the headaches.  At this point Ajovy is no longer covered through insurance.  We will hold off on trying Ajovy since Botox is being tried right now.  She has had 60% reduction in headaches and 70% improvement in severity.  Headaches are getting better and better with each session of Botox.  We will continue the  "higher dose of Botox since headaches are much better.  Continue.    Nurtec has been working better as a preventive medication with the headaches coming down even more.  We will continue Nurtec.  We will add verapamil as another preventive medication.  She has tried Topamax and propanolol which were not effective.  Cannot use antidepressants since she is on other antidepressants.  Could consider trial of Depakote though weight gain could be a concern.    For abortive therapy she is tried Imitrex and Zomig without any benefit. Over-the-counter medications are not helpful. She finds benefit with opioids but due to risk of addiction we do not want to use that. Fiorinal did not help.  Maxalt, Nurtec, ibuprofen combination has been somewhat helpful.  Relpax was tried which was not effective.  We will switch from Nurtec abortive to preventative.  Continue Nurtec and Maxalt.     Encourage her to keep a log of her headaches.  Return back in 5 months.    -    Ibuprofen 400 mg initially and then Maxalt in 1 hour.  -     rizatriptan (MAXALT) 10 MG tablet; Take 1 tablet (10 mg) by mouth at onset of headache for migraine May repeat in 2 hours.  -     Botulinum Toxin Type A (BOTOX) 200 units injection 200 Units  -     rimegepant (NURTEC) 75 MG ODT tablet; Take 1 tablet (75 mg) by mouth every 48 hours Repeat in 2 hours if needed  -     verapamil ER (VERELAN) 120 MG 24 hr capsule; Take 1 capsule (120 mg) by mouth at bedtime    Return in about 3 months (around 1/31/2024) for Botox.     Botox PA  \" The patient has a diagnosis of chronic migraines. She has more than 15 headache days a month with each headache lasting at least 4 hours despite use of triptans. Her headaches have been there for over 6 months. She has been screened for medication overuse headaches and she does not have that. She has tried Topamax, Propranolol for 3 months without any significant benefit. Antidepressants cannot be added since she is on multiple " "antidepressants already. She has tried Ajovy with minimal success. I would request that the Botox be approved for her.    She has had about 50% improvement with 155 units of Botox.  I will request a higher dose of Botox to be approved to see if she can get even further benefit.  60-70% improvement with the higher dose of Botox.  \"    Over 31 minutes were spent coordinating the care for the patient on the day of the encounter.  This includes previsit, during visit and post visit activities as documented above.  Counseling patient.  Prescription management.  Refractory problem.  (Activities include but not inclusive of reviewing chart, reviewing outside records, reviewing labs and imaging study results as well as the images, patient visit time including getting history and exam,  use if applicable, review of test results with the patient and coming up with a plan in a shared model, counseling patient and family, education and answering patient questions, EMR , EMR diagnosis entry and problem list management, medication reconciliation and prescription management if applicable, paperwork if applicable, printing documents and documentation of the visit activities.)        Edi Rojo MD  Neurologist  [facility]  Tel:- [phone]    This note was dictated using voice recognition software.  Any grammatical or context distortions are unintentional and inherent to the software.    "

## 2023-10-31 NOTE — LETTER
10/31/2023         RE: Efra Ames  [address] [facility] [address]        Dear Colleague,    Thank you for referring your patient, Efra Ames, to the [facility]UROLOG[facility] [place]. Please see a copy of my visit note below.    NEUROLOGY OUTPATIENT PROGRESS NOTE   Oct 31, 2023     CHIEF COMPLAINT/REASON FOR VISIT/REASON FOR CONSULT  Patient presents with:  Botox    REASON FOR CONSULTATION- Headaches    REFERRAL SOURCE  Dr. Guerra  CC Dr. Guerra    HISTORY OF PRESENT ILLNESS  Efra Ames is a 48 year old female seen today for evaluation of headaches. She reports that she has been having headaches since age 12. Previously was seen by Dr. Cuadra. Headaches have been 20 days a month. She was put on Ajovy by Dr. Guerra which reduce the headaches to 10 days a month but currently she is switching insurances and this has been stopped.    Headaches are generally bitemporal. They can also be in the frontal region. Sometimes it is in the middle of the head. Headaches are more of a pressure and occasional throbbing. There is occasional visual auras. She reports associated photophobia phonophobia and nausea. Occasionally will get dizziness and even fall down with the headaches. Denies any clear triggers. Does have mental illness which can affect the headaches. She will get difficulty with thinking when she has a bad headache.    Patient is tried Topamax which she did not tolerate has also been on propanolol which she did not tolerate. Has not tried nortriptyline or amitriptyline. Has been on multiple antidepressants for her depression and anxiety. Currently does not want to add more antidepressants due to weight gain issues and being on multiple antidepressants. For abortive therapy she has been on Zomig which did not work. Has also tried Imitrex which did not work. Has tried over-the-counter Tylenol and ibuprofen without benefit.    10/25/21  Patient returns today.  She reports that she continues  to have 25 headache days a month.  She is interested in doing the Botox today but is also interested in trying the Ajovy 1 more time.  She is comfortable with the needles but feels that the Parminder was helping and works in a different mechanism.  Ajovy was stopped last year because the insurance would not cover it anymore.  She reports that the headaches had improved with Ajovy but not completely.  Had questions about the Maxalt.  Headaches have been lasting multiple days.  Headache does become more tolerable after she takes Maxalt for day 1 and wonders if she can take it on day 2 or not.  Discussed with her about taking the Maxalt as soon as she can and repeating it in 2 hours.  She can take the Maxalt the next day the discussed that it might not be as effective as it was on day 1.  She reports no other new issues.    12/27/21  Patient returns today.  She previously was having 25 headache days a month but now is having 16-18 headache days a month.  Feels that the Botox has reduced the edge and headaches are less severe.  Continues to have headaches on the right side.  Occasionally will get the visual auras.    Is on Maxalt but feels that it occasionally will not work.  Is not using anything with the Maxalt.  Does not feel she has enough medication for the whole month.  Is interested in trying new medications.  Given that she gets limited supply of Maxalt she cannot predict which headaches are going to be severe enough that she wants to use the Maxalt or not.  Does daily using the Maxalt.    We discussed the Ajovy and it was decided that we will hold off on that for right now since she is trying the Botox and trying to medications at the same time might not be very beneficial.    9/29/22  To distribute Botox was done 2 months ago.  She has had 4 sessions of Botox.  Reports that the headaches are 3-4 times a week but she is not having 6 to 7 days of continuous headaches.  Headaches can last the full day.  She generally  uses Nurtec, ibuprofen or Maxalt in various combinations depending on the severity of her headaches.  Is interested in trying other medications to see if the headaches can be under better control.  Botox with each session is making things better.  Denies any side effects to the Botox.  No other new symptoms.    4/3/23  Patient returns today.  She has had about 6 sessions of Botox.  The higher dose of Botox has been more effective.  She is having 5-6 bad headaches in the month.  Does have minor headaches couple of times a week.  Is using ibuprofen for those.  Nurtec works the best for abortive therapy.  Relpax has not been so effective.  Maxalt has been more effective.  Wants to continue with the Maxalt.  No other new symptoms.  Headaches are much less severe compared to before with the Botox though continues to be in the same frequency.  Wants to try the Botox for few more sessions.    10/31/23  Patient returns today.  With the last session of Botox she had headaches every day but these are much less severe compared to before.  More tolerable.  Nurtec is also helping her preventive medication.  Maxalt does work as abortive therapy.  Wants to continue the current plan.  Discussed about adding other preventive medications and willing to try verapamil.  Topamax and propanolol have been used in the past.  No other new concerns.  No other provoking factors other than the weather.    Previous history is reviewed and this is unchanged.    PAST MEDICAL/SURGICAL HISTORY  History reviewed. No pertinent past medical history.  Patient Active Problem List   Diagnosis     Allergic rhinitis     Anisometropia     Anxiety state     Carpal tunnel syndrome     Classical migraine with intractable migraine     Gluten intolerance     History of strabismus surgery     Hypotropia of left eye     Insomnia     Migraine headache     Mild persistent asthma     Moderate episode of recurrent major depressive disorder (H)     Pre-diabetes      Morbid obesity (H)   Significant depression, anxiety, prediabetes, gluten intolerance, high cholesterol    FAMILY HISTORY  Family History   Problem Relation Age of Onset     Migraines Mother    Positive for mother and grandmother with migraines. Daughter with migraines    SOCIAL HISTORY  Social History     Tobacco Use     Smoking status: Never     Smokeless tobacco: Never   Substance Use Topics     Alcohol use: Not Currently       SYSTEMS REVIEW  Twelve-system ROS was done and other than the HPI this was negative except for numbness and tingling, balance coordination problems, dizziness, memory loss, anxiety, depression, respiratory problems/asthma.  No new symptoms/issues.    MEDICATIONS  albuterol (PROAIR HFA/PROVENTIL HFA/VENTOLIN HFA) 108 (90 Base) MCG/ACT inhaler, Inhale 2 puffs into the lungs  amphetamine-dextroamphetamine (ADDERALL XR) 20 MG 24 hr capsule, Take 40 mg by mouth  atorvastatin (LIPITOR) 20 MG tablet, Take 20 mg by mouth   cetirizine (ZYRTEC) 10 MG tablet, Take 10-20 mg by mouth  cholecalciferol 50 MCG (2000 UT) CAPS, 3 daily  DULoxetine (CYMBALTA) 60 MG capsule, Take 60 mg by mouth  FLUoxetine (PROZAC) 20 MG capsule, Take 40 mg by mouth  fluticasone-salmeterol (ADVAIR HFA) 230-21 MCG/ACT inhaler, Inhale 1 puff into the lungs  montelukast (SINGULAIR) 10 MG tablet, Take 10 mg by mouth  rimegepant (NURTEC) 75 MG ODT tablet, Take 1 tablet (75 mg) by mouth every 48 hours  rizatriptan (MAXALT) 10 MG tablet, Take 1 tablet (10 mg) by mouth at onset of headache for migraine May repeat in 2 hours.  sertraline (ZOLOFT) 100 MG tablet,   Specialty Vitamins Products (VITAMINS FOR HAIR) TABS, Take 1 tablet by mouth  traZODone (DESYREL) 100 MG tablet, Take 100 mg by mouth    No current facility-administered medications on file prior to visit.       PHYSICAL EXAMINATION  VITALS: BP (!) 121/94   Pulse 108   Resp 16   Wt 107.5 kg (237 lb)   BMI 39.44 kg/m    GENERAL: Healthy appearing, alert, no acute distress,  normal habitus.  CARDIOVASCULAR: Extremities warm and well perfused. Pulses present.   NEUROLOGICAL:  Patient is awake and grossly oriented to self, place and time.  Attention span is normal.  Memory is grossly intact.  Language is fluent and follows commands appropriately.  Appropriate fund of knowledge. Cranial nerves 2-12 are intact. There is no pronator drift.  Motor exam shows 5/5 strength in all extremities.  Tone is symmetric bilaterally in upper and lower extremities.  (Previously reflexes are symmetric and 2+ in upper extremities and lower extremities. Sensory exam is grossly intact to light touch, pin prick and vibration.) Finger to nose and heel to shin is without dysmetria.  Romberg is negative.  Gait is normal and the patient is able to do tandem walk and walk on toes and heels.  Exam stable.    DIAGNOSTICS  RELEVANT LABS  Recent BMP shows elevated creatinine of 1.21.  TSH normal in the past.    OUTSIDE RECORDS  Notes from Dr. Gurera reviewed. Pertinent information is noted in the HPI.     MRI brain-images reviewed with patient.  She does have low-lying cerebellar tonsils though these are very minimal.  No major structural lesions noted.  IMPRESSION:  1.  No evidence of an acute intracranial abnormality.  2.  Low-lying cerebellar tonsils as above.    IMPRESSION/REPORT/PLAN  Intractable chronic migraine without aura and without status migrainosus  Obesity  Concurrent use of multiple antidepressants  Low-lying cerebellar tonsils    This is a 48 year old female with headache suggestive of chronic migraines that are refractory to multiple medications.  MRI brain was negative for structural lesions.  Does show low-lying cerebellar tonsils though most likely these are asymptomatic.  Discussed significance of this with the patient. She has tried Topamax, propanolol, Ajovy without any significant benefit. Ajovy did provide partial benefit though did not completely take care of the headaches.  At this point Ajovy is  "no longer covered through insurance.  We will hold off on trying Ajovy since Botox is being tried right now.  She has had 60% reduction in headaches and 70% improvement in severity.  Headaches are getting better and better with each session of Botox.  We will continue the higher dose of Botox since headaches are much better.  Continue.    Nurtec has been working better as a preventive medication with the headaches coming down even more.  We will continue Nurtec.  We will add verapamil as another preventive medication.  She has tried Topamax and propanolol which were not effective.  Cannot use antidepressants since she is on other antidepressants.  Could consider trial of Depakote though weight gain could be a concern.    For abortive therapy she is tried Imitrex and Zomig without any benefit. Over-the-counter medications are not helpful. She finds benefit with opioids but due to risk of addiction we do not want to use that. Fiorinal did not help.  Maxalt, Nurtec, ibuprofen combination has been somewhat helpful.  Relpax was tried which was not effective.  We will switch from Nurtec abortive to preventative.  Continue Nurtec and Maxalt.     Encourage her to keep a log of her headaches.  Return back in 5 months.    -    Ibuprofen 400 mg initially and then Maxalt in 1 hour.  -     rizatriptan (MAXALT) 10 MG tablet; Take 1 tablet (10 mg) by mouth at onset of headache for migraine May repeat in 2 hours.  -     Botulinum Toxin Type A (BOTOX) 200 units injection 200 Units  -     rimegepant (NURTEC) 75 MG ODT tablet; Take 1 tablet (75 mg) by mouth every 48 hours Repeat in 2 hours if needed  -     verapamil ER (VERELAN) 120 MG 24 hr capsule; Take 1 capsule (120 mg) by mouth at bedtime    Return in about 3 months (around 1/31/2024) for Botox.     Botox PA  \" The patient has a diagnosis of chronic migraines. She has more than 15 headache days a month with each headache lasting at least 4 hours despite use of triptans. Her " "headaches have been there for over 6 months. She has been screened for medication overuse headaches and she does not have that. She has tried Topamax, Propranolol for 3 months without any significant benefit. Antidepressants cannot be added since she is on multiple antidepressants already. She has tried Ajovy with minimal success. I would request that the Botox be approved for her.    She has had about 50% improvement with 155 units of Botox.  I will request a higher dose of Botox to be approved to see if she can get even further benefit.  60-70% improvement with the higher dose of Botox.  \"    Over 31 minutes were spent coordinating the care for the patient on the day of the encounter.  This includes previsit, during visit and post visit activities as documented above.  Counseling patient.  Prescription management.  Refractory problem.  (Activities include but not inclusive of reviewing chart, reviewing outside records, reviewing labs and imaging study results as well as the images, patient visit time including getting history and exam,  use if applicable, review of test results with the patient and coming up with a plan in a shared model, counseling patient and family, education and answering patient questions, EMR , EMR diagnosis entry and problem list management, medication reconciliation and prescription management if applicable, paperwork if applicable, printing documents and documentation of the visit activities.)        Edi Rojo MD  Neurologist  [facility]  Tel:- [phone]    This note was dictated using voice recognition software.  Any grammatical or context distortions are unintentional and inherent to the software.      Again, thank you for allowing me to participate in the care of your patient.        Sincerely,        Edi Rojo MD  "

## 2023-11-11 ENCOUNTER — HEALTH MAINTENANCE LETTER (OUTPATIENT)
Age: 48
End: 2023-11-11

## 2024-01-22 ENCOUNTER — TRANSCRIBE ORDERS (OUTPATIENT)
Dept: OTHER | Age: 49
End: 2024-01-22

## 2024-01-22 DIAGNOSIS — G43.109 MIGRAINE WITH TYPICAL AURA: Primary | ICD-10-CM

## 2024-01-22 DIAGNOSIS — G43.109 MIGRAINE WITH AURA AND WITHOUT STATUS MIGRAINOSUS, NOT INTRACTABLE: ICD-10-CM

## 2024-01-31 ENCOUNTER — OFFICE VISIT (OUTPATIENT)
Dept: NEUROLOGY | Facility: CLINIC | Age: 49
End: 2024-01-31
Payer: COMMERCIAL

## 2024-01-31 VITALS
RESPIRATION RATE: 18 BRPM | HEART RATE: 100 BPM | BODY MASS INDEX: 39.44 KG/M2 | DIASTOLIC BLOOD PRESSURE: 88 MMHG | WEIGHT: 237 LBS | SYSTOLIC BLOOD PRESSURE: 130 MMHG

## 2024-01-31 DIAGNOSIS — G43.719 INTRACTABLE CHRONIC MIGRAINE WITHOUT AURA AND WITHOUT STATUS MIGRAINOSUS: ICD-10-CM

## 2024-01-31 PROCEDURE — 64615 CHEMODENERV MUSC MIGRAINE: CPT | Performed by: PSYCHIATRY & NEUROLOGY

## 2024-01-31 RX ORDER — DEXTROMETHORPHAN HYDROBROMIDE, BUPROPION HYDROCHLORIDE 105; 45 MG/1; MG/1
45-105 TABLET, MULTILAYER, EXTENDED RELEASE ORAL 2 TIMES DAILY
COMMUNITY
Start: 2024-01-26

## 2024-01-31 RX ORDER — VERAPAMIL HYDROCHLORIDE 120 MG/1
120 CAPSULE, EXTENDED RELEASE ORAL AT BEDTIME
Qty: 90 CAPSULE | Refills: 1 | Status: SHIPPED | OUTPATIENT
Start: 2024-01-31 | End: 2024-03-26

## 2024-01-31 NOTE — LETTER
1/31/2024         RE: Efra Ames  [address] [address]        Dear Colleague,    Thank you for referring your patient, Efra Ames, to the [facility] [place]. Please see a copy of my visit note below.    See procedure note.      Again, thank you for allowing me to participate in the care of your patient.        Sincerely,        Edi Rojo MD

## 2024-01-31 NOTE — PROCEDURES
NEUROLOGY PROCEDURE NOTE  Jan 31, 2024      Chronic migraine Botox injection    CONSENT: The procedure was explained to the patient. The risks and benefits of the procedure and the alternatives were discussed with the patient. The patient was given an opportunity to ask any questions she had about the procedure. A verbal consent was obtained from the patient. A written consent is available in the chart.    PRE-PROCEDURE  Diagnosis: Chronic migraine  Headache frequency before the use of Botox:- 25 headache days per month  Headache frequency with Botox use:- 70-80% improvement in first 2 months, some wearing off in last 3 weeks.     EXAM  GENERAL: Healthy appearing, alert, no acute distress, normal habitus.  NEUROLOGICAL:  Patient is alert with fluent language.  Extraocular movements are intact.  Facial movements are present and symmetric.  No arm drift.    PROCEDURE SUMMARY:   The following muscles were identified after palpating for landmarks and the overlying skin was cleansed with alcohol. These muscles were then injected using a 30 guage- half  inch needle with the following doses of onabotulinumtoxin A. A 5 unit/ 0.1 ml dilution was used for the injections. A 2 x 100 unit vial was used.    Corrugators 5 units each side.  Procerus 5 units.  Frontalis 10 units each side.  Temporalis 25 units each side.  Occipitalis 15 units each side.  Paraspinals 10 units each side.  Trapezius 30 units each side.    Units Injected: 195 Units  Units Discarded: 5 Units  Lot Number and Expiration: [id number]; 6/2026    The patient tolerated the procedure without any immediate complaints and will call the Neurology clinic if she has any problems or side effects from the medication. The patient will follow up in the Neurology clinic as previously decided.      Edi Rojo MD  Neurologist  [facility]  [phone]

## 2024-03-26 ENCOUNTER — OFFICE VISIT (OUTPATIENT)
Dept: NEUROLOGY | Facility: CLINIC | Age: 49
End: 2024-03-26
Payer: COMMERCIAL

## 2024-03-26 VITALS
SYSTOLIC BLOOD PRESSURE: 120 MMHG | HEIGHT: 65 IN | BODY MASS INDEX: 41.15 KG/M2 | DIASTOLIC BLOOD PRESSURE: 72 MMHG | WEIGHT: 247 LBS | HEART RATE: 94 BPM

## 2024-03-26 DIAGNOSIS — M79.18 MYALGIA, OTHER SITE: ICD-10-CM

## 2024-03-26 DIAGNOSIS — G43.719 INTRACTABLE CHRONIC MIGRAINE WITHOUT AURA AND WITHOUT STATUS MIGRAINOSUS: Primary | ICD-10-CM

## 2024-03-26 PROCEDURE — 20553 NJX 1/MLT TRIGGER POINTS 3/>: CPT | Performed by: PSYCHIATRY & NEUROLOGY

## 2024-03-26 RX ORDER — RIZATRIPTAN BENZOATE 10 MG/1
10 TABLET ORAL
Qty: 18 TABLET | Refills: 11 | Status: SHIPPED | OUTPATIENT
Start: 2024-03-26

## 2024-03-26 RX ORDER — BUPIVACAINE HYDROCHLORIDE 2.5 MG/ML
3 INJECTION, SOLUTION EPIDURAL; INFILTRATION; INTRACAUDAL ONCE
Status: COMPLETED | OUTPATIENT
Start: 2024-03-26 | End: 2024-03-26

## 2024-03-26 RX ORDER — VERAPAMIL HYDROCHLORIDE 180 MG/1
180 CAPSULE, EXTENDED RELEASE ORAL AT BEDTIME
Qty: 90 CAPSULE | Refills: 1 | Status: SHIPPED | OUTPATIENT
Start: 2024-03-26 | End: 2024-10-02

## 2024-03-26 RX ADMIN — BUPIVACAINE HYDROCHLORIDE 7.5 MG: 2.5 INJECTION, SOLUTION EPIDURAL; INFILTRATION; INTRACAUDAL at 13:32

## 2024-03-26 NOTE — NURSING NOTE
Chief Complaint   Patient presents with    Headache     Patient states she is having about 25 migraines per month. Same as last visit.     Kary Deras on 3/26/2024 at 12:37 PM

## 2024-03-26 NOTE — PROCEDURES
NEUROLOGY PROCEDURE NOTE  Mar 26, 2024      Trigger Point injection    CONSENT: The procedure was explained to the patient. The risks and benefits of the procedure and the alternatives were discussed with the patient. The patient was given an opportunity to ask any questions they had about the procedure. A verbal consent was obtained from the patient. A written consent is available in the chart.    PRE-PROCEDURE  Diagnosis: Neck pain/Trigger point/Migraine Headaches/Myositis    EXAM  GENERAL: Healthy appearing, alert, no acute distress, normal habitus.  NEUROLOGICAL:  Patient is alert with fluent language.  Extraocular movements are intact.  Facial movements are present and symmetric.  No arm drift.    PROCEDURE SUMMARY:   The following trigger points were identified after palpating for landmarks and the overlying skin was cleansed with alcohol. These muscles were then injected using a 30 guage- half inch needle with the following doses of bupivacaine 0.25% preservative-free.     Left trapezius 1 ml  Left medial trapezius 0.5 mL  Right trapezius 1 ml  Right medial trapezius 0.5 mL    Lot Number and Expiration: [id number]; 01/25    The patient tolerated the procedure without any immediate complaints and will call the Neurology clinic if she has any problems or side effects from the medication.  The patient noticed some improvement in the pain right after the injection.  The patient will follow up in the Neurology clinic as previously decided.      Edi Rojo MD  Neurologist  [facility]  [phone]

## 2024-03-26 NOTE — PROGRESS NOTES
NEUROLOGY OUTPATIENT PROGRESS NOTE   Mar 26, 2024     CHIEF COMPLAINT/REASON FOR VISIT/REASON FOR CONSULT  Patient presents with:  Headache: Patient states she is having about 25 migraines per month. Same as last visit.    REASON FOR CONSULTATION- Headaches    REFERRAL SOURCE  Dr. Guerra  CC Dr. Guerra    HISTORY OF PRESENT ILLNESS  Efra Ames is a 48 year old female seen today for evaluation of headaches. She reports that she has been having headaches since age 12. Previously was seen by Dr. Cuadra. Headaches have been 20 days a month. She was put on Ajovy by Dr. Guerra which reduce the headaches to 10 days a month but currently she is switching insurances and this has been stopped.    Headaches are generally bitemporal. They can also be in the frontal region. Sometimes it is in the middle of the head. Headaches are more of a pressure and occasional throbbing. There is occasional visual auras. She reports associated photophobia phonophobia and nausea. Occasionally will get dizziness and even fall down with the headaches. Denies any clear triggers. Does have mental illness which can affect the headaches. She will get difficulty with thinking when she has a bad headache.    Patient is tried Topamax which she did not tolerate has also been on propanolol which she did not tolerate. Has not tried nortriptyline or amitriptyline. Has been on multiple antidepressants for her depression and anxiety. Currently does not want to add more antidepressants due to weight gain issues and being on multiple antidepressants. For abortive therapy she has been on Zomig which did not work. Has also tried Imitrex which did not work. Has tried over-the-counter Tylenol and ibuprofen without benefit.    10/25/21  Patient returns today.  She reports that she continues to have 25 headache days a month.  She is interested in doing the Botox today but is also interested in trying the Ajovy 1 more time.  She is comfortable with the needles but feels that  the Parminder was helping and works in a different mechanism.  Ajovy was stopped last year because the insurance would not cover it anymore.  She reports that the headaches had improved with Ajovy but not completely.  Had questions about the Maxalt.  Headaches have been lasting multiple days.  Headache does become more tolerable after she takes Maxalt for day 1 and wonders if she can take it on day 2 or not.  Discussed with her about taking the Maxalt as soon as she can and repeating it in 2 hours.  She can take the Maxalt the next day the discussed that it might not be as effective as it was on day 1.  She reports no other new issues.    12/27/21  Patient returns today.  She previously was having 25 headache days a month but now is having 16-18 headache days a month.  Feels that the Botox has reduced the edge and headaches are less severe.  Continues to have headaches on the right side.  Occasionally will get the visual auras.    Is on Maxalt but feels that it occasionally will not work.  Is not using anything with the Maxalt.  Does not feel she has enough medication for the whole month.  Is interested in trying new medications.  Given that she gets limited supply of Maxalt she cannot predict which headaches are going to be severe enough that she wants to use the Maxalt or not.  Does daily using the Maxalt.    We discussed the Ajovy and it was decided that we will hold off on that for right now since she is trying the Botox and trying to medications at the same time might not be very beneficial.    9/29/22  To distribute Botox was done 2 months ago.  She has had 4 sessions of Botox.  Reports that the headaches are 3-4 times a week but she is not having 6 to 7 days of continuous headaches.  Headaches can last the full day.  She generally uses Nurtec, ibuprofen or Maxalt in various combinations depending on the severity of her headaches.  Is interested in trying other medications to see if the headaches can be under  better control.  Botox with each session is making things better.  Denies any side effects to the Botox.  No other new symptoms.    4/3/23  Patient returns today.  She has had about 6 sessions of Botox.  The higher dose of Botox has been more effective.  She is having 5-6 bad headaches in the month.  Does have minor headaches couple of times a week.  Is using ibuprofen for those.  Nurtec works the best for abortive therapy.  Relpax has not been so effective.  Maxalt has been more effective.  Wants to continue with the Maxalt.  No other new symptoms.  Headaches are much less severe compared to before with the Botox though continues to be in the same frequency.  Wants to try the Botox for few more sessions.    10/31/23  Patient returns today.  With the last session of Botox she had headaches every day but these are much less severe compared to before.  More tolerable.  Nurtec is also helping her preventive medication.  Maxalt does work as abortive therapy.  Wants to continue the current plan.  Discussed about adding other preventive medications and willing to try verapamil.  Topamax and propanolol have been used in the past.  No other new concerns.  No other provoking factors other than the weather.    3/26/24  Patient returns today  1.  Feels that the headaches are slowly getting better with the Botox but continues to have the headache still quite frequently.  Maxalt does work as abortive therapy.  She has found benefit with the verapamil as well.  No side effects.  Combination with Nurtec has been beneficial.  Headaches are overall slowly getting better with the treatments.  2.  Has not found any new triggers for headaches.  3.  Is interested in trying trigger point injections today.  No other further concerns.    Previous history is reviewed and this is unchanged.    PAST MEDICAL/SURGICAL HISTORY  No past medical history on file.  Patient Active Problem List   Diagnosis    Allergic rhinitis    Anisometropia     Anxiety state    Carpal tunnel syndrome    Classical migraine with intractable migraine    Gluten intolerance    History of strabismus surgery    Hypotropia of left eye    Insomnia    Migraine headache    Mild persistent asthma    Moderate episode of recurrent major depressive disorder (H)    Pre-diabetes    Morbid obesity (H)   Significant depression, anxiety, prediabetes, gluten intolerance, high cholesterol    FAMILY HISTORY  Family History   Problem Relation Age of Onset    Migraines Mother    Positive for mother and grandmother with migraines. Daughter with migraines    SOCIAL HISTORY  Social History     Tobacco Use    Smoking status: Never    Smokeless tobacco: Never   Substance Use Topics    Alcohol use: Not Currently       SYSTEMS REVIEW  Twelve-system ROS was done and other than the HPI this was negative except for numbness and tingling, balance coordination problems, dizziness, memory loss, anxiety, depression, respiratory problems/asthma.  No new symptoms/issues.    MEDICATIONS  albuterol (PROAIR HFA/PROVENTIL HFA/VENTOLIN HFA) 108 (90 Base) MCG/ACT inhaler, Inhale 2 puffs into the lungs  atorvastatin (LIPITOR) 20 MG tablet, Take 20 mg by mouth   AUVELITY  MG TBCR, Take  mg by mouth 2 times daily  cetirizine (ZYRTEC) 10 MG tablet, Take 10-20 mg by mouth  cholecalciferol 50 MCG (2000 UT) CAPS, 3 daily  DULoxetine (CYMBALTA) 60 MG capsule, Take 60 mg by mouth  fluticasone-salmeterol (ADVAIR HFA) 230-21 MCG/ACT inhaler, Inhale 1 puff into the lungs  Specialty Vitamins Products (VITAMINS FOR HAIR) TABS, Take 1 tablet by mouth  traZODone (DESYREL) 100 MG tablet, Take 100 mg by mouth  amphetamine-dextroamphetamine (ADDERALL XR) 20 MG 24 hr capsule, Take 40 mg by mouth (Patient not taking: Reported on 3/26/2024)  FLUoxetine (PROZAC) 20 MG capsule, Take 40 mg by mouth (Patient not taking: Reported on 3/26/2024)  montelukast (SINGULAIR) 10 MG tablet, Take 10 mg by mouth (Patient not taking: Reported on  "3/26/2024)  sertraline (ZOLOFT) 100 MG tablet,     No current facility-administered medications on file prior to visit.       PHYSICAL EXAMINATION  VITALS: /72   Pulse 94   Ht 1.651 m (5' 5\")   Wt 112 kg (247 lb)   BMI 41.10 kg/m    GENERAL: Healthy appearing, alert, no acute distress, normal habitus.  CARDIOVASCULAR: Extremities warm and well perfused. Pulses present.   NEUROLOGICAL:  Patient is awake and grossly oriented to self, place and time.  Attention span is normal.  Memory is grossly intact.  Language is fluent and follows commands appropriately.  Appropriate fund of knowledge. Cranial nerves 2-12 are intact. There is no pronator drift.  Motor exam shows 5/5 strength in all extremities.  Tone is symmetric bilaterally in upper and lower extremities.  (Previously reflexes are symmetric and 2+ in upper extremities and lower extremities. Sensory exam is grossly intact to light touch, pin prick and vibration.) Finger to nose and heel to shin is without dysmetria.  Romberg is negative.  Gait is normal and the patient is able to do tandem walk and walk on toes and heels.  Exam stable/no change.    DIAGNOSTICS  RELEVANT LABS  Recent BMP shows elevated creatinine of 1.21.  TSH normal in the past.    OUTSIDE RECORDS  Notes from Dr. Guerra reviewed. Pertinent information is noted in the HPI.     MRI brain-images reviewed with patient.  She does have low-lying cerebellar tonsils though these are very minimal.  No major structural lesions noted.  IMPRESSION:  1.  No evidence of an acute intracranial abnormality.  2.  Low-lying cerebellar tonsils as above.    IMPRESSION/REPORT/PLAN  Intractable chronic migraine without aura and without status migrainosus  Obesity  Concurrent use of multiple antidepressants  Low-lying cerebellar tonsils    This is a 48 year old female with headache suggestive of chronic migraines that are refractory to multiple medications.  MRI brain was negative for structural lesions.  Does show " low-lying cerebellar tonsils though most likely these are asymptomatic.  Discussed significance of this with the patient. She has tried Topamax, propanolol, Ajovy without any significant benefit. Ajovy did provide partial benefit though did not completely take care of the headaches.  At this point Ajovy is no longer covered through insurance.  We will hold off on trying Ajovy since Botox is being tried right now.  She has had 60% reduction in headaches and 70% improvement in severity.  Headaches are getting better and better with each session of Botox.  We will continue the higher dose of Botox since headaches are much better.  Continue.    Nurtec has been working better as a preventive medication with the headaches coming down even more.  We will continue Nurtec.  Addition of verapamil has helped and will increase the dose to 180 mg to see if it works even better.  She has tried Topamax and propanolol which were not effective.  Cannot use antidepressants since she is on other antidepressants.  Could consider trial of Depakote though weight gain could be a concern.    She does have some wearing off of the Botox towards the last month and will try trigger point injections today to see if that can get her through the last month.  She does have a lot of shoulder tenderness which can lead to her migraines.    For abortive therapy she is tried Imitrex and Zomig without any benefit. Over-the-counter medications are not helpful. She finds benefit with opioids but due to risk of addiction we do not want to use that. Fiorinal did not help.  Maxalt, Nurtec, ibuprofen combination has been somewhat helpful.  Relpax was tried which was not effective.  We will switch from Nurtec abortive to preventative.  Continue Nurtec and Maxalt.     Encourage her to keep a log of her headaches.  Return back in 3 months for the next trigger point injections.  1 month for Botox.    -    Ibuprofen 400 mg initially and then Maxalt in 1 hour.  -    "  Botulinum Toxin Type A (BOTOX) 200 units injection 200 Units  -     verapamil ER (VERELAN) 180 MG 24 hr capsule; Take 1 capsule (180 mg) by mouth at bedtime  -     rizatriptan (MAXALT) 10 MG tablet; Take 1 tablet (10 mg) by mouth at onset of headache for migraine May repeat in 2 hours.  -     rimegepant (NURTEC) 75 MG ODT tablet; Take 1 tablet (75 mg) by mouth every 48 hours  Trigger point injections     Return in about 3 months (around 6/26/2024) for Trigger point injections, In-Clinic Visit (must).     Botox PA  \" The patient has a diagnosis of chronic migraines. She has more than 15 headache days a month with each headache lasting at least 4 hours despite use of triptans. Her headaches have been there for over 6 months. She has been screened for medication overuse headaches and she does not have that. She has tried Topamax, Propranolol for 3 months without any significant benefit. Antidepressants cannot be added since she is on multiple antidepressants already. She has tried Ajovy with minimal success. I would request that the Botox be approved for her.    She has had about 50% improvement with 155 units of Botox.  I will request a higher dose of Botox to be approved to see if she can get even further benefit.  70-80% improvement with the higher dose of Botox.  \"    Over 31 minutes were spent coordinating the care for the patient on the day of the encounter.  This includes previsit, during visit and post visit activities as documented above.  Counseling patient.  Prescription management.  Refractory problem.  (Activities include but not inclusive of reviewing chart, reviewing outside records, reviewing labs and imaging study results as well as the images, patient visit time including getting history and exam,  use if applicable, review of test results with the patient and coming up with a plan in a shared model, counseling patient and family, education and answering patient questions, EMR order " entry, EMR diagnosis entry and problem list management, medication reconciliation and prescription management if applicable, paperwork if applicable, printing documents and documentation of the visit activities.)        Edi Rojo MD  Neurologist  [facility]  Tel:- [phone]    This note was dictated using voice recognition software.  Any grammatical or context distortions are unintentional and inherent to the software.

## 2024-03-26 NOTE — LETTER
3/26/2024         RE: Efra Ames  [address] [id number]        Dear Colleague,    Thank you for referring your patient, Efra Ames, to the [facility] NEUROLOGY [facility]INIC [place]. Please see a copy of my visit note below.    NEUROLOGY OUTPATIENT PROGRESS NOTE   Mar 26, 2024     CHIEF COMPLAINT/REASON FOR VISIT/REASON FOR CONSULT  Patient presents with:  Headache: Patient states she is having about 25 migraines per month. Same as last visit.    REASON FOR CONSULTATION- Headaches    REFERRAL SOURCE  Dr. Guerra  CC Dr. Guerra    HISTORY OF PRESENT ILLNESS  Efra Ames is a 48 year old female seen today for evaluation of headaches. She reports that she has been having headaches since age 12. Previously was seen by Dr. Cuadra. Headaches have been 20 days a month. She was put on Ajovy by Dr. Guerra which reduce the headaches to 10 days a month but currently she is switching insurances and this has been stopped.    Headaches are generally bitemporal. They can also be in the frontal region. Sometimes it is in the middle of the head. Headaches are more of a pressure and occasional throbbing. There is occasional visual auras. She reports associated photophobia phonophobia and nausea. Occasionally will get dizziness and even fall down with the headaches. Denies any clear triggers. Does have mental illness which can affect the headaches. She will get difficulty with thinking when she has a bad headache.    Patient is tried Topamax which she did not tolerate has also been on propanolol which she did not tolerate. Has not tried nortriptyline or amitriptyline. Has been on multiple antidepressants for her depression and anxiety. Currently does not want to add more antidepressants due to weight gain issues and being on multiple antidepressants. For abortive therapy she has been on Zomig which did not work. Has also tried Imitrex which did not work. Has tried over-the-counter Tylenol and ibuprofen  without benefit.    10/25/21  Patient returns today.  She reports that she continues to have 25 headache days a month.  She is interested in doing the Botox today but is also interested in trying the Ajovy 1 more time.  She is comfortable with the needles but feels that the Parminder was helping and works in a different mechanism.  Ajovy was stopped last year because the insurance would not cover it anymore.  She reports that the headaches had improved with Ajovy but not completely.  Had questions about the Maxalt.  Headaches have been lasting multiple days.  Headache does become more tolerable after she takes Maxalt for day 1 and wonders if she can take it on day 2 or not.  Discussed with her about taking the Maxalt as soon as she can and repeating it in 2 hours.  She can take the Maxalt the next day the discussed that it might not be as effective as it was on day 1.  She reports no other new issues.    12/27/21  Patient returns today.  She previously was having 25 headache days a month but now is having 16-18 headache days a month.  Feels that the Botox has reduced the edge and headaches are less severe.  Continues to have headaches on the right side.  Occasionally will get the visual auras.    Is on Maxalt but feels that it occasionally will not work.  Is not using anything with the Maxalt.  Does not feel she has enough medication for the whole month.  Is interested in trying new medications.  Given that she gets limited supply of Maxalt she cannot predict which headaches are going to be severe enough that she wants to use the Maxalt or not.  Does daily using the Maxalt.    We discussed the Ajovy and it was decided that we will hold off on that for right now since she is trying the Botox and trying to medications at the same time might not be very beneficial.    9/29/22  To distribute Botox was done 2 months ago.  She has had 4 sessions of Botox.  Reports that the headaches are 3-4 times a week but she is not having 6  to 7 days of continuous headaches.  Headaches can last the full day.  She generally uses Nurtec, ibuprofen or Maxalt in various combinations depending on the severity of her headaches.  Is interested in trying other medications to see if the headaches can be under better control.  Botox with each session is making things better.  Denies any side effects to the Botox.  No other new symptoms.    4/3/23  Patient returns today.  She has had about 6 sessions of Botox.  The higher dose of Botox has been more effective.  She is having 5-6 bad headaches in the month.  Does have minor headaches couple of times a week.  Is using ibuprofen for those.  Nurtec works the best for abortive therapy.  Relpax has not been so effective.  Maxalt has been more effective.  Wants to continue with the Maxalt.  No other new symptoms.  Headaches are much less severe compared to before with the Botox though continues to be in the same frequency.  Wants to try the Botox for few more sessions.    10/31/23  Patient returns today.  With the last session of Botox she had headaches every day but these are much less severe compared to before.  More tolerable.  Nurtec is also helping her preventive medication.  Maxalt does work as abortive therapy.  Wants to continue the current plan.  Discussed about adding other preventive medications and willing to try verapamil.  Topamax and propanolol have been used in the past.  No other new concerns.  No other provoking factors other than the weather.    3/26/24  Patient returns today  1.  Feels that the headaches are slowly getting better with the Botox but continues to have the headache still quite frequently.  Maxalt does work as abortive therapy.  She has found benefit with the verapamil as well.  No side effects.  Combination with Nurtec has been beneficial.  Headaches are overall slowly getting better with the treatments.  2.  Has not found any new triggers for headaches.  3.  Is interested in trying  trigger point injections today.  No other further concerns.    Previous history is reviewed and this is unchanged.    PAST MEDICAL/SURGICAL HISTORY  No past medical history on file.  Patient Active Problem List   Diagnosis     Allergic rhinitis     Anisometropia     Anxiety state     Carpal tunnel syndrome     Classical migraine with intractable migraine     Gluten intolerance     History of strabismus surgery     Hypotropia of left eye     Insomnia     Migraine headache     Mild persistent asthma     Moderate episode of recurrent major depressive disorder (H)     Pre-diabetes     Morbid obesity (H)   Significant depression, anxiety, prediabetes, gluten intolerance, high cholesterol    FAMILY HISTORY  Family History   Problem Relation Age of Onset     Migraines Mother    Positive for mother and grandmother with migraines. Daughter with migraines    SOCIAL HISTORY  Social History     Tobacco Use     Smoking status: Never     Smokeless tobacco: Never   Substance Use Topics     Alcohol use: Not Currently       SYSTEMS REVIEW  Twelve-system ROS was done and other than the HPI this was negative except for numbness and tingling, balance coordination problems, dizziness, memory loss, anxiety, depression, respiratory problems/asthma.  No new symptoms/issues.    MEDICATIONS  albuterol (PROAIR HFA/PROVENTIL HFA/VENTOLIN HFA) 108 (90 Base) MCG/ACT inhaler, Inhale 2 puffs into the lungs  atorvastatin (LIPITOR) 20 MG tablet, Take 20 mg by mouth   AUVELITY  MG TBCR, Take  mg by mouth 2 times daily  cetirizine (ZYRTEC) 10 MG tablet, Take 10-20 mg by mouth  cholecalciferol 50 MCG (2000 UT) CAPS, 3 daily  DULoxetine (CYMBALTA) 60 MG capsule, Take 60 mg by mouth  fluticasone-salmeterol (ADVAIR HFA) 230-21 MCG/ACT inhaler, Inhale 1 puff into the lungs  Specialty Vitamins Products (VITAMINS FOR HAIR) TABS, Take 1 tablet by mouth  traZODone (DESYREL) 100 MG tablet, Take 100 mg by mouth  amphetamine-dextroamphetamine  "(ADDERALL XR) 20 MG 24 hr capsule, Take 40 mg by mouth (Patient not taking: Reported on 3/26/2024)  FLUoxetine (PROZAC) 20 MG capsule, Take 40 mg by mouth (Patient not taking: Reported on 3/26/2024)  montelukast (SINGULAIR) 10 MG tablet, Take 10 mg by mouth (Patient not taking: Reported on 3/26/2024)  sertraline (ZOLOFT) 100 MG tablet,     No current facility-administered medications on file prior to visit.       PHYSICAL EXAMINATION  VITALS: /72   Pulse 94   Ht 1.651 m (5' 5\")   Wt 112 kg (247 lb)   BMI 41.10 kg/m    GENERAL: Healthy appearing, alert, no acute distress, normal habitus.  CARDIOVASCULAR: Extremities warm and well perfused. Pulses present.   NEUROLOGICAL:  Patient is awake and grossly oriented to self, place and time.  Attention span is normal.  Memory is grossly intact.  Language is fluent and follows commands appropriately.  Appropriate fund of knowledge. Cranial nerves 2-12 are intact. There is no pronator drift.  Motor exam shows 5/5 strength in all extremities.  Tone is symmetric bilaterally in upper and lower extremities.  (Previously reflexes are symmetric and 2+ in upper extremities and lower extremities. Sensory exam is grossly intact to light touch, pin prick and vibration.) Finger to nose and heel to shin is without dysmetria.  Romberg is negative.  Gait is normal and the patient is able to do tandem walk and walk on toes and heels.  Exam stable/no change.    DIAGNOSTICS  RELEVANT LABS  Recent BMP shows elevated creatinine of 1.21.  TSH normal in the past.    OUTSIDE RECORDS  Notes from Dr. Guerra reviewed. Pertinent information is noted in the HPI.     MRI brain-images reviewed with patient.  She does have low-lying cerebellar tonsils though these are very minimal.  No major structural lesions noted.  IMPRESSION:  1.  No evidence of an acute intracranial abnormality.  2.  Low-lying cerebellar tonsils as above.    IMPRESSION/REPORT/PLAN  Intractable chronic migraine without aura and " without status migrainosus  Obesity  Concurrent use of multiple antidepressants  Low-lying cerebellar tonsils    This is a 48 year old female with headache suggestive of chronic migraines that are refractory to multiple medications.  MRI brain was negative for structural lesions.  Does show low-lying cerebellar tonsils though most likely these are asymptomatic.  Discussed significance of this with the patient. She has tried Topamax, propanolol, Ajovy without any significant benefit. Ajovy did provide partial benefit though did not completely take care of the headaches.  At this point Ajovy is no longer covered through insurance.  We will hold off on trying Ajovy since Botox is being tried right now.  She has had 60% reduction in headaches and 70% improvement in severity.  Headaches are getting better and better with each session of Botox.  We will continue the higher dose of Botox since headaches are much better.  Continue.    Nurtec has been working better as a preventive medication with the headaches coming down even more.  We will continue Nurtec.  Addition of verapamil has helped and will increase the dose to 180 mg to see if it works even better.  She has tried Topamax and propanolol which were not effective.  Cannot use antidepressants since she is on other antidepressants.  Could consider trial of Depakote though weight gain could be a concern.    She does have some wearing off of the Botox towards the last month and will try trigger point injections today to see if that can get her through the last month.  She does have a lot of shoulder tenderness which can lead to her migraines.    For abortive therapy she is tried Imitrex and Zomig without any benefit. Over-the-counter medications are not helpful. She finds benefit with opioids but due to risk of addiction we do not want to use that. Fiorinal did not help.  Maxalt, Nurtec, ibuprofen combination has been somewhat helpful.  Relpax was tried which was not  "effective.  We will switch from Nurtec abortive to preventative.  Continue Nurtec and Maxalt.     Encourage her to keep a log of her headaches.  Return back in 3 months for the next trigger point injections.  1 month for Botox.    -    Ibuprofen 400 mg initially and then Maxalt in 1 hour.  -     Botulinum Toxin Type A (BOTOX) 200 units injection 200 Units  -     verapamil ER (VERELAN) 180 MG 24 hr capsule; Take 1 capsule (180 mg) by mouth at bedtime  -     rizatriptan (MAXALT) 10 MG tablet; Take 1 tablet (10 mg) by mouth at onset of headache for migraine May repeat in 2 hours.  -     rimegepant (NURTEC) 75 MG ODT tablet; Take 1 tablet (75 mg) by mouth every 48 hours  Trigger point injections     Return in about 3 months (around 6/26/2024) for Trigger point injections, In-Clinic Visit (must).     Botox PA  \" The patient has a diagnosis of chronic migraines. She has more than 15 headache days a month with each headache lasting at least 4 hours despite use of triptans. Her headaches have been there for over 6 months. She has been screened for medication overuse headaches and she does not have that. She has tried Topamax, Propranolol for 3 months without any significant benefit. Antidepressants cannot be added since she is on multiple antidepressants already. She has tried Ajovy with minimal success. I would request that the Botox be approved for her.    She has had about 50% improvement with 155 units of Botox.  I will request a higher dose of Botox to be approved to see if she can get even further benefit.  70-80% improvement with the higher dose of Botox.  \"    Over 31 minutes were spent coordinating the care for the patient on the day of the encounter.  This includes previsit, during visit and post visit activities as documented above.  Counseling patient.  Prescription management.  Refractory problem.  (Activities include but not inclusive of reviewing chart, reviewing outside records, reviewing labs and imaging " study results as well as the images, patient visit time including getting history and exam,  use if applicable, review of test results with the patient and coming up with a plan in a shared model, counseling patient and family, education and answering patient questions, EMR , EMR diagnosis entry and problem list management, medication reconciliation and prescription management if applicable, paperwork if applicable, printing documents and documentation of the visit activities.)        Edi Rojo MD  Neurologist  [facility]rology [facility]  Tel:- [phone]    This note was dictated using voice recognition software.  Any grammatical or context distortions are unintentional and inherent to the software.      Again, thank you for allowing me to participate in the care of your patient.        Sincerely,        Edi Rojo MD

## 2024-04-30 ENCOUNTER — OFFICE VISIT (OUTPATIENT)
Dept: NEUROLOGY | Facility: CLINIC | Age: 49
End: 2024-04-30
Payer: COMMERCIAL

## 2024-04-30 VITALS
HEART RATE: 90 BPM | DIASTOLIC BLOOD PRESSURE: 90 MMHG | BODY MASS INDEX: 41.75 KG/M2 | WEIGHT: 250.9 LBS | SYSTOLIC BLOOD PRESSURE: 139 MMHG

## 2024-04-30 DIAGNOSIS — G43.719 INTRACTABLE CHRONIC MIGRAINE WITHOUT AURA AND WITHOUT STATUS MIGRAINOSUS: Primary | ICD-10-CM

## 2024-04-30 PROCEDURE — 64615 CHEMODENERV MUSC MIGRAINE: CPT | Performed by: PSYCHIATRY & NEUROLOGY

## 2024-04-30 NOTE — PROCEDURES
NEUROLOGY PROCEDURE NOTE  Apr 30, 2024      Chronic migraine Botox injection    CONSENT: The procedure was explained to the patient. The risks and benefits of the procedure and the alternatives were discussed with the patient. The patient was given an opportunity to ask any questions she had about the procedure. A verbal consent was obtained from the patient. A written consent is available in the chart.    PRE-PROCEDURE  Diagnosis: Chronic migraine  Headache frequency before the use of Botox:- 25 headache days per month  Headache frequency with Botox use:- 70-80% improvement in first 2 months, some wearing off in last 3 weeks.     EXAM  GENERAL: Healthy appearing, alert, no acute distress, normal habitus.  NEUROLOGICAL:  Patient is alert with fluent language.  Extraocular movements are intact.  Facial movements are present and symmetric.  No arm drift.    PROCEDURE SUMMARY:   The following muscles were identified after palpating for landmarks and the overlying skin was cleansed with alcohol. These muscles were then injected using a 30 guage- half  inch needle with the following doses of onabotulinumtoxin A. A 5 unit/ 0.1 ml dilution was used for the injections. A 2 x 100 unit vial was used.    Corrugators 5 units each side.  Procerus 5 units.  Frontalis 10 units each side.  Temporalis 25 units each side.  Occipitalis 15 units each side.  Paraspinals 10 units each side.  Trapezius 30 units each side.    Units Injected: 195 Units  Units Discarded: 5 Units  Lot Number and Expiration: [id number]; 6/2026    The patient tolerated the procedure without any immediate complaints and will call the Neurology clinic if she has any problems or side effects from the medication. The patient will follow up in the Neurology clinic as previously decided.      Edi Rojo MD  Neurologist  [facility]  [phone]

## 2024-04-30 NOTE — LETTER
4/30/2024         RE: Efra Ames  [address] [address]        Dear Colleague,    Thank you for referring your patient, Efra Ames, to the [facility] NEUROLOGY CLINIC [place]. Please see a copy of my visit note below.    See procedure note.       Again, thank you for allowing me to participate in the care of your patient.        Sincerely,        Edi Rojo MD

## 2024-05-01 ENCOUNTER — MYC MEDICAL ADVICE (OUTPATIENT)
Dept: NEUROLOGY | Facility: CLINIC | Age: 49
End: 2024-05-01
Payer: COMMERCIAL

## 2024-05-03 NOTE — TELEPHONE ENCOUNTER
Called and spoke with pharmacist at [facility], verbalizing providers approval of the 18 tablet dispense for Rizatriptan. Pharmacist will update system to allow this quantity dispense.    Brody aWshington RN, BSN  [facility]

## 2024-06-14 ENCOUNTER — OFFICE VISIT (OUTPATIENT)
Dept: NEUROLOGY | Facility: CLINIC | Age: 49
End: 2024-06-14
Payer: COMMERCIAL

## 2024-06-14 VITALS
WEIGHT: 242.7 LBS | BODY MASS INDEX: 40.39 KG/M2 | DIASTOLIC BLOOD PRESSURE: 83 MMHG | SYSTOLIC BLOOD PRESSURE: 107 MMHG | HEART RATE: 101 BPM

## 2024-06-14 DIAGNOSIS — M79.18 MYALGIA, OTHER SITE: ICD-10-CM

## 2024-06-14 DIAGNOSIS — G43.719 INTRACTABLE CHRONIC MIGRAINE WITHOUT AURA AND WITHOUT STATUS MIGRAINOSUS: Primary | ICD-10-CM

## 2024-06-14 RX ORDER — BUPIVACAINE HYDROCHLORIDE 2.5 MG/ML
4 INJECTION, SOLUTION EPIDURAL; INFILTRATION; INTRACAUDAL ONCE
Status: COMPLETED | OUTPATIENT
Start: 2024-06-14 | End: 2024-06-14

## 2024-06-14 RX ADMIN — BUPIVACAINE HYDROCHLORIDE 10 MG: 2.5 INJECTION, SOLUTION EPIDURAL; INFILTRATION; INTRACAUDAL at 12:05

## 2024-06-14 NOTE — NURSING NOTE
"Efra Ames is a 49 year old female who presents for:  Chief Complaint   Patient presents with    Follow Up     Trigger pt. INJ         Initial Vitals:  /83   Pulse 101   Wt 110.1 kg (242 lb 11.2 oz)   BMI 40.39 kg/m   Estimated body mass index is 40.39 kg/m  as calculated from the following:    Height as of 3/26/24: 1.651 m (5' 5\").    Weight as of this encounter: 110.1 kg (242 lb 11.2 oz).. Body surface area is 2.25 meters squared. BP completed using cuff size: wrist cuff    Jelani Velasco  "

## 2024-06-14 NOTE — PROCEDURES
NEUROLOGY PROCEDURE NOTE  Mar 26, 2024      Trigger Point injection    CONSENT: The procedure was explained to the patient. The risks and benefits of the procedure and the alternatives were discussed with the patient. The patient was given an opportunity to ask any questions they had about the procedure. A verbal consent was obtained from the patient. A written consent is available in the chart.    PRE-PROCEDURE  Diagnosis: Neck pain/Trigger point/Migraine Headaches/Myositis    EXAM  GENERAL: Healthy appearing, alert, no acute distress, normal habitus.  NEUROLOGICAL:  Patient is alert with fluent language.  Extraocular movements are intact.  Facial movements are present and symmetric.  No arm drift.    PROCEDURE SUMMARY:   The following trigger points were identified after palpating for landmarks and the overlying skin was cleansed with alcohol. These muscles were then injected using a 30 guage- half inch needle with the following doses of bupivacaine 0.25% preservative-free.     Left trapezius 1 ml  Left medial trapezius 0.5 mL  Right trapezius 1 ml  Right medial trapezius 0.5 mL  Left longissimus 0.5 mL  Right longissimus 0.5 mL    Lot Number and Expiration: [id number]; 01/25    The patient tolerated the procedure without any immediate complaints and will call the Neurology clinic if she has any problems or side effects from the medication.  The patient noticed some improvement in the pain right after the injection.  The patient will follow up in the Neurology clinic as previously decided.      Edi Rojo MD  Neurologist  [facility]  [phone]

## 2024-06-14 NOTE — LETTER
6/14/2024      Efra Ames  [address] [facility] [address]      Dear Colleague,    Thank you for referring your patient, Efra Ames, to the [facility] [place]. Please see a copy of my visit note below.    NEUROLOGY OUTPATIENT PROGRESS NOTE   Jun 14, 2024     CHIEF COMPLAINT/REASON FOR VISIT/REASON FOR CONSULT  Patient presents with:  Follow Up: Trigger pt. INJ     REASON FOR CONSULTATION- Headaches    REFERRAL SOURCE  Dr. Guerra  CC Dr. Guerra    HISTORY OF PRESENT ILLNESS  Efra Ames is a 49 year old female seen today for evaluation of headaches. She reports that she has been having headaches since age 12. Previously was seen by Dr. Cuadra. Headaches have been 20 days a month. She was put on Ajovy by Dr. Guerra which reduce the headaches to 10 days a month but currently she is switching insurances and this has been stopped.    Headaches are generally bitemporal. They can also be in the frontal region. Sometimes it is in the middle of the head. Headaches are more of a pressure and occasional throbbing. There is occasional visual auras. She reports associated photophobia phonophobia and nausea. Occasionally will get dizziness and even fall down with the headaches. Denies any clear triggers. Does have mental illness which can affect the headaches. She will get difficulty with thinking when she has a bad headache.    Patient is tried Topamax which she did not tolerate has also been on propanolol which she did not tolerate. Has not tried nortriptyline or amitriptyline. Has been on multiple antidepressants for her depression and anxiety. Currently does not want to add more antidepressants due to weight gain issues and being on multiple antidepressants. For abortive therapy she has been on Zomig which did not work. Has also tried Imitrex which did not work. Has tried over-the-counter Tylenol and ibuprofen without benefit.    10/25/21  Patient returns today.  She reports that she  continues to have 25 headache days a month.  She is interested in doing the Botox today but is also interested in trying the Ajovy 1 more time.  She is comfortable with the needles but feels that the Parminder was helping and works in a different mechanism.  Ajovy was stopped last year because the insurance would not cover it anymore.  She reports that the headaches had improved with Ajovy but not completely.  Had questions about the Maxalt.  Headaches have been lasting multiple days.  Headache does become more tolerable after she takes Maxalt for day 1 and wonders if she can take it on day 2 or not.  Discussed with her about taking the Maxalt as soon as she can and repeating it in 2 hours.  She can take the Maxalt the next day the discussed that it might not be as effective as it was on day 1.  She reports no other new issues.    12/27/21  Patient returns today.  She previously was having 25 headache days a month but now is having 16-18 headache days a month.  Feels that the Botox has reduced the edge and headaches are less severe.  Continues to have headaches on the right side.  Occasionally will get the visual auras.    Is on Maxalt but feels that it occasionally will not work.  Is not using anything with the Maxalt.  Does not feel she has enough medication for the whole month.  Is interested in trying new medications.  Given that she gets limited supply of Maxalt she cannot predict which headaches are going to be severe enough that she wants to use the Maxalt or not.  Does daily using the Maxalt.    We discussed the Ajovy and it was decided that we will hold off on that for right now since she is trying the Botox and trying to medications at the same time might not be very beneficial.    9/29/22  To distribute Botox was done 2 months ago.  She has had 4 sessions of Botox.  Reports that the headaches are 3-4 times a week but she is not having 6 to 7 days of continuous headaches.  Headaches can last the full day.  She  generally uses Nurtec, ibuprofen or Maxalt in various combinations depending on the severity of her headaches.  Is interested in trying other medications to see if the headaches can be under better control.  Botox with each session is making things better.  Denies any side effects to the Botox.  No other new symptoms.    4/3/23  Patient returns today.  She has had about 6 sessions of Botox.  The higher dose of Botox has been more effective.  She is having 5-6 bad headaches in the month.  Does have minor headaches couple of times a week.  Is using ibuprofen for those.  Nurtec works the best for abortive therapy.  Relpax has not been so effective.  Maxalt has been more effective.  Wants to continue with the Maxalt.  No other new symptoms.  Headaches are much less severe compared to before with the Botox though continues to be in the same frequency.  Wants to try the Botox for few more sessions.    10/31/23  Patient returns today.  With the last session of Botox she had headaches every day but these are much less severe compared to before.  More tolerable.  Nurtec is also helping her preventive medication.  Maxalt does work as abortive therapy.  Wants to continue the current plan.  Discussed about adding other preventive medications and willing to try verapamil.  Topamax and propanolol have been used in the past.  No other new concerns.  No other provoking factors other than the weather.    3/26/24  Patient returns today  1.  Feels that the headaches are slowly getting better with the Botox but continues to have the headache still quite frequently.  Maxalt does work as abortive therapy.  She has found benefit with the verapamil as well.  No side effects.  Combination with Nurtec has been beneficial.  Headaches are overall slowly getting better with the treatments.  2.  Has not found any new triggers for headaches.  3.  Is interested in trying trigger point injections today.  No other further  concerns.    6/14/24  Patient returns today  1.  Trigger point injections have been helpful.  She wants to continue it.  Wants injections done in the longissimus muscle  2.  Botox has been helping significantly with the headaches.  The headaches are still very frequent but less severe.  3.  Remains on the verapamil.  Is finding some benefit though cannot say for sure  4.  Nurtec as a preventative is not very helpful though as an abortive medication is very helpful.  Has to use it more than 8 times a month.  Additional Maxalt as needed for more severe headaches  No new medications    Previous history is reviewed and this is unchanged.    PAST MEDICAL/SURGICAL HISTORY  No past medical history on file.  Patient Active Problem List   Diagnosis     Allergic rhinitis     Anisometropia     Anxiety state     Carpal tunnel syndrome     Classical migraine with intractable migraine     Gluten intolerance     History of strabismus surgery     Hypotropia of left eye     Insomnia     Migraine headache     Mild persistent asthma     Moderate episode of recurrent major depressive disorder (H)     Pre-diabetes     Morbid obesity (H)   Significant depression, anxiety, prediabetes, gluten intolerance, high cholesterol    FAMILY HISTORY  Family History   Problem Relation Age of Onset     Migraines Mother    Positive for mother and grandmother with migraines. Daughter with migraines    SOCIAL HISTORY  Social History     Tobacco Use     Smoking status: Never     Smokeless tobacco: Never   Substance Use Topics     Alcohol use: Not Currently       SYSTEMS REVIEW  Twelve-system ROS was done and other than the HPI this was negative except for numbness and tingling, balance coordination problems, dizziness, memory loss, anxiety, depression, respiratory problems/asthma.  No new symptoms/issues.    MEDICATIONS  Current Outpatient Medications   Medication Sig Dispense Refill     albuterol (PROAIR HFA/PROVENTIL HFA/VENTOLIN HFA) 108 (90 Base)  MCG/ACT inhaler Inhale 2 puffs into the lungs       atorvastatin (LIPITOR) 20 MG tablet Take 20 mg by mouth        AUVELITY  MG TBCR Take  mg by mouth 2 times daily       cetirizine (ZYRTEC) 10 MG tablet Take 10-20 mg by mouth       cholecalciferol 50 MCG (2000 UT) CAPS 3 daily       DULoxetine (CYMBALTA) 60 MG capsule Take 60 mg by mouth       fluticasone-salmeterol (ADVAIR HFA) 230-21 MCG/ACT inhaler Inhale 1 puff into the lungs       rimegepant (NURTEC) 75 MG ODT tablet Place 1 tablet (75 mg) under the tongue every 48 hours 16 tablet 3     rizatriptan (MAXALT) 10 MG tablet Take 1 tablet (10 mg) by mouth at onset of headache for migraine May repeat in 2 hours. 18 tablet 11     Specialty Vitamins Products (VITAMINS FOR HAIR) TABS Take 1 tablet by mouth       traZODone (DESYREL) 100 MG tablet Take 100 mg by mouth       verapamil ER (VERELAN) 180 MG 24 hr capsule Take 1 capsule (180 mg) by mouth at bedtime 90 capsule 1     amphetamine-dextroamphetamine (ADDERALL XR) 20 MG 24 hr capsule Take 40 mg by mouth (Patient not taking: Reported on 3/26/2024)       FLUoxetine (PROZAC) 20 MG capsule Take 40 mg by mouth (Patient not taking: Reported on 3/26/2024)       montelukast (SINGULAIR) 10 MG tablet Take 10 mg by mouth (Patient not taking: Reported on 3/26/2024)       sertraline (ZOLOFT) 100 MG tablet  (Patient not taking: Reported on 3/26/2024)       No current facility-administered medications for this visit.        PHYSICAL EXAMINATION  VITALS: /83   Pulse 101   Wt 110.1 kg (242 lb 11.2 oz)   BMI 40.39 kg/m    GENERAL: Healthy appearing, alert, no acute distress, normal habitus.  CARDIOVASCULAR: Extremities warm and well perfused. Pulses present.   NEUROLOGICAL:  Patient is awake and grossly oriented to self, place and time.  Attention span is normal.  Memory is grossly intact.  Language is fluent and follows commands appropriately.  Appropriate fund of knowledge. Cranial nerves 2-12 are intact.  There is no pronator drift.  Motor exam shows 5/5 strength in all extremities.  Tone is symmetric bilaterally in upper and lower extremities.  (Previously reflexes are symmetric and 2+ in upper extremities and lower extremities. Sensory exam is grossly intact to light touch, pin prick and vibration.) Finger to nose and heel to shin is without dysmetria.  Romberg is negative.  Gait is normal and the patient is able to do tandem walk and walk on toes and heels.  No change with the exam.    DIAGNOSTICS  RELEVANT LABS  Recent BMP shows elevated creatinine of 1.21.  TSH normal in the past.    OUTSIDE RECORDS  Notes from Dr. Guerra reviewed. Pertinent information is noted in the HPI.     MRI brain-images reviewed with patient.  She does have low-lying cerebellar tonsils though these are very minimal.  No major structural lesions noted.  IMPRESSION:  1.  No evidence of an acute intracranial abnormality.  2.  Low-lying cerebellar tonsils as above.    IMPRESSION/REPORT/PLAN  Intractable chronic migraine without aura and without status migrainosus  Obesity  Concurrent use of multiple antidepressants  Low-lying cerebellar tonsils    This is a 49 year old female with headache suggestive of chronic migraines that are refractory to multiple medications.  MRI brain was negative for structural lesions.  Does show low-lying cerebellar tonsils though most likely these are asymptomatic.  Discussed significance of this with the patient. She has tried Topamax, propanolol, Ajovy without any significant benefit. Ajovy did provide partial benefit though did not completely take care of the headaches.  At this point Ajovy is no longer covered through insurance.  We will hold off on trying Ajovy since Botox is being tried right now.  She has had 60% reduction in headaches and 70% improvement in severity.  Headaches are getting better and better with each session of Botox.  We will continue the higher dose of Botox since headaches are much better.   Continue.    She further complains of 3-week weeks of headache after the Botox.  This was the first time this happened.  No other clear causes.  Will monitor for right now.    Nurtec has been working better as a preventive medication with the headaches coming down even more.  We will continue Nurtec.  Addition of verapamil has helped and will increase the dose to 180 mg to see if it works even better.  Continue to monitor.  She has tried Topamax and propanolol which were not effective.  Cannot use antidepressants since she is on other antidepressants.  Could consider trial of Depakote though weight gain could be a concern.    She does have some wearing off of the Botox towards the last month and will try trigger point injections today to see if that can get her through the last month.  She does have a lot of shoulder tenderness which can lead to her migraines.  This has been helpful though still has some headaches.  Will inject in the longus miss today.    For abortive therapy she is tried Imitrex and Zomig without any benefit. Over-the-counter medications are not helpful. She finds benefit with opioids but due to risk of addiction we do not want to use that. Fiorinal did not help.  Maxalt, Nurtec, ibuprofen combination has been somewhat helpful.  Relpax was tried which was not effective.  We will switch from Nurtec abortive to preventative.  It has not been very effective for preventative options though she uses more as an abortive and more than 8 times a month.  Continue Nurtec and Maxalt.     Encourage her to keep a log of her headaches.  Return back in 3 months for the next trigger point injections.  1 month for Botox.    -    Ibuprofen 400 mg initially and then Maxalt in 1 hour.  -     Botulinum Toxin Type A (BOTOX) 200 units injection 200 Units  -     verapamil ER (VERELAN) 180 MG 24 hr capsule; Take 1 capsule (180 mg) by mouth at bedtime  -     rizatriptan (MAXALT) 10 MG tablet; Take 1 tablet (10 mg) by  "mouth at onset of headache for migraine May repeat in 2 hours.  -     rimegepant (NURTEC) 75 MG ODT tablet; Take 1 tablet (75 mg) by mouth every 48 hours  Trigger point injections     Return in about 3 months (around 9/14/2024) for Trigger point injections.     Botox PA  \" The patient has a diagnosis of chronic migraines. She has more than 15 headache days a month with each headache lasting at least 4 hours despite use of triptans. Her headaches have been there for over 6 months. She has been screened for medication overuse headaches and she does not have that. She has tried Topamax, Propranolol for 3 months without any significant benefit. Antidepressants cannot be added since she is on multiple antidepressants already. She has tried Ajovy with minimal success. I would request that the Botox be approved for her.    She has had about 50% improvement with 155 units of Botox.  I will request a higher dose of Botox to be approved to see if she can get even further benefit.  70-80% improvement with the higher dose of Botox.  \"    Over 30 minutes were spent coordinating the care for the patient on the day of the encounter.  This includes previsit, during visit and post visit activities as documented above.  Counseling patient.  Refractory problems.  Prescription management.  Multiple problems reviewed.  (Activities include but not inclusive of reviewing chart, reviewing outside records, reviewing labs and imaging study results as well as the images, patient visit time including getting history and exam,  use if applicable, review of test results with the patient and coming up with a plan in a shared model, counseling patient and family, education and answering patient questions, EMR , EMR diagnosis entry and problem list management, medication reconciliation and prescription management if applicable, paperwork if applicable, printing documents and documentation of the visit activities.)        Harsh " MD Alia  Neurologist  [facility]  Tel:- [phone]    This note was dictated using voice recognition software.  Any grammatical or context distortions are unintentional and inherent to the software.      Again, thank you for allowing me to participate in the care of your patient.        Sincerely,        Edi Rojo MD

## 2024-06-14 NOTE — PROGRESS NOTES
NEUROLOGY OUTPATIENT PROGRESS NOTE   Jun 14, 2024     CHIEF COMPLAINT/REASON FOR VISIT/REASON FOR CONSULT  Patient presents with:  Follow Up: Trigger pt. INJ     REASON FOR CONSULTATION- Headaches    REFERRAL SOURCE  Dr. Guerra  CC Dr. Guerra    HISTORY OF PRESENT ILLNESS  Efra Ames is a 49 year old female seen today for evaluation of headaches. She reports that she has been having headaches since age 12. Previously was seen by Dr. Cuadra. Headaches have been 20 days a month. She was put on Ajovy by Dr. Guerra which reduce the headaches to 10 days a month but currently she is switching insurances and this has been stopped.    Headaches are generally bitemporal. They can also be in the frontal region. Sometimes it is in the middle of the head. Headaches are more of a pressure and occasional throbbing. There is occasional visual auras. She reports associated photophobia phonophobia and nausea. Occasionally will get dizziness and even fall down with the headaches. Denies any clear triggers. Does have mental illness which can affect the headaches. She will get difficulty with thinking when she has a bad headache.    Patient is tried Topamax which she did not tolerate has also been on propanolol which she did not tolerate. Has not tried nortriptyline or amitriptyline. Has been on multiple antidepressants for her depression and anxiety. Currently does not want to add more antidepressants due to weight gain issues and being on multiple antidepressants. For abortive therapy she has been on Zomig which did not work. Has also tried Imitrex which did not work. Has tried over-the-counter Tylenol and ibuprofen without benefit.    10/25/21  Patient returns today.  She reports that she continues to have 25 headache days a month.  She is interested in doing the Botox today but is also interested in trying the Ajovy 1 more time.  She is comfortable with the needles but feels that the Parminder was helping and works in a different mechanism.   Ajovy was stopped last year because the insurance would not cover it anymore.  She reports that the headaches had improved with Ajovy but not completely.  Had questions about the Maxalt.  Headaches have been lasting multiple days.  Headache does become more tolerable after she takes Maxalt for day 1 and wonders if she can take it on day 2 or not.  Discussed with her about taking the Maxalt as soon as she can and repeating it in 2 hours.  She can take the Maxalt the next day the discussed that it might not be as effective as it was on day 1.  She reports no other new issues.    12/27/21  Patient returns today.  She previously was having 25 headache days a month but now is having 16-18 headache days a month.  Feels that the Botox has reduced the edge and headaches are less severe.  Continues to have headaches on the right side.  Occasionally will get the visual auras.    Is on Maxalt but feels that it occasionally will not work.  Is not using anything with the Maxalt.  Does not feel she has enough medication for the whole month.  Is interested in trying new medications.  Given that she gets limited supply of Maxalt she cannot predict which headaches are going to be severe enough that she wants to use the Maxalt or not.  Does daily using the Maxalt.    We discussed the Ajovy and it was decided that we will hold off on that for right now since she is trying the Botox and trying to medications at the same time might not be very beneficial.    9/29/22  To distribute Botox was done 2 months ago.  She has had 4 sessions of Botox.  Reports that the headaches are 3-4 times a week but she is not having 6 to 7 days of continuous headaches.  Headaches can last the full day.  She generally uses Nurtec, ibuprofen or Maxalt in various combinations depending on the severity of her headaches.  Is interested in trying other medications to see if the headaches can be under better control.  Botox with each session is making things  better.  Denies any side effects to the Botox.  No other new symptoms.    4/3/23  Patient returns today.  She has had about 6 sessions of Botox.  The higher dose of Botox has been more effective.  She is having 5-6 bad headaches in the month.  Does have minor headaches couple of times a week.  Is using ibuprofen for those.  Nurtec works the best for abortive therapy.  Relpax has not been so effective.  Maxalt has been more effective.  Wants to continue with the Maxalt.  No other new symptoms.  Headaches are much less severe compared to before with the Botox though continues to be in the same frequency.  Wants to try the Botox for few more sessions.    10/31/23  Patient returns today.  With the last session of Botox she had headaches every day but these are much less severe compared to before.  More tolerable.  Nurtec is also helping her preventive medication.  Maxalt does work as abortive therapy.  Wants to continue the current plan.  Discussed about adding other preventive medications and willing to try verapamil.  Topamax and propanolol have been used in the past.  No other new concerns.  No other provoking factors other than the weather.    3/26/24  Patient returns today  1.  Feels that the headaches are slowly getting better with the Botox but continues to have the headache still quite frequently.  Maxalt does work as abortive therapy.  She has found benefit with the verapamil as well.  No side effects.  Combination with Nurtec has been beneficial.  Headaches are overall slowly getting better with the treatments.  2.  Has not found any new triggers for headaches.  3.  Is interested in trying trigger point injections today.  No other further concerns.    6/14/24  Patient returns today  1.  Trigger point injections have been helpful.  She wants to continue it.  Wants injections done in the longissimus muscle  2.  Botox has been helping significantly with the headaches.  The headaches are still very frequent but  less severe.  3.  Remains on the verapamil.  Is finding some benefit though cannot say for sure  4.  Nurtec as a preventative is not very helpful though as an abortive medication is very helpful.  Has to use it more than 8 times a month.  Additional Maxalt as needed for more severe headaches  No new medications    Previous history is reviewed and this is unchanged.    PAST MEDICAL/SURGICAL HISTORY  No past medical history on file.  Patient Active Problem List   Diagnosis    Allergic rhinitis    Anisometropia    Anxiety state    Carpal tunnel syndrome    Classical migraine with intractable migraine    Gluten intolerance    History of strabismus surgery    Hypotropia of left eye    Insomnia    Migraine headache    Mild persistent asthma    Moderate episode of recurrent major depressive disorder (H)    Pre-diabetes    Morbid obesity (H)   Significant depression, anxiety, prediabetes, gluten intolerance, high cholesterol    FAMILY HISTORY  Family History   Problem Relation Age of Onset    Migraines Mother    Positive for mother and grandmother with migraines. Daughter with migraines    SOCIAL HISTORY  Social History     Tobacco Use    Smoking status: Never    Smokeless tobacco: Never   Substance Use Topics    Alcohol use: Not Currently       SYSTEMS REVIEW  Twelve-system ROS was done and other than the HPI this was negative except for numbness and tingling, balance coordination problems, dizziness, memory loss, anxiety, depression, respiratory problems/asthma.  No new symptoms/issues.    MEDICATIONS  Current Outpatient Medications   Medication Sig Dispense Refill    albuterol (PROAIR HFA/PROVENTIL HFA/VENTOLIN HFA) 108 (90 Base) MCG/ACT inhaler Inhale 2 puffs into the lungs      atorvastatin (LIPITOR) 20 MG tablet Take 20 mg by mouth       AUVELITY  MG TBCR Take  mg by mouth 2 times daily      cetirizine (ZYRTEC) 10 MG tablet Take 10-20 mg by mouth      cholecalciferol 50 MCG (2000 UT) CAPS 3 daily       DULoxetine (CYMBALTA) 60 MG capsule Take 60 mg by mouth      fluticasone-salmeterol (ADVAIR HFA) 230-21 MCG/ACT inhaler Inhale 1 puff into the lungs      rimegepant (NURTEC) 75 MG ODT tablet Place 1 tablet (75 mg) under the tongue every 48 hours 16 tablet 3    rizatriptan (MAXALT) 10 MG tablet Take 1 tablet (10 mg) by mouth at onset of headache for migraine May repeat in 2 hours. 18 tablet 11    Specialty Vitamins Products (VITAMINS FOR HAIR) TABS Take 1 tablet by mouth      traZODone (DESYREL) 100 MG tablet Take 100 mg by mouth      verapamil ER (VERELAN) 180 MG 24 hr capsule Take 1 capsule (180 mg) by mouth at bedtime 90 capsule 1    amphetamine-dextroamphetamine (ADDERALL XR) 20 MG 24 hr capsule Take 40 mg by mouth (Patient not taking: Reported on 3/26/2024)      FLUoxetine (PROZAC) 20 MG capsule Take 40 mg by mouth (Patient not taking: Reported on 3/26/2024)      montelukast (SINGULAIR) 10 MG tablet Take 10 mg by mouth (Patient not taking: Reported on 3/26/2024)      sertraline (ZOLOFT) 100 MG tablet  (Patient not taking: Reported on 3/26/2024)       No current facility-administered medications for this visit.        PHYSICAL EXAMINATION  VITALS: /83   Pulse 101   Wt 110.1 kg (242 lb 11.2 oz)   BMI 40.39 kg/m    GENERAL: Healthy appearing, alert, no acute distress, normal habitus.  CARDIOVASCULAR: Extremities warm and well perfused. Pulses present.   NEUROLOGICAL:  Patient is awake and grossly oriented to self, place and time.  Attention span is normal.  Memory is grossly intact.  Language is fluent and follows commands appropriately.  Appropriate fund of knowledge. Cranial nerves 2-12 are intact. There is no pronator drift.  Motor exam shows 5/5 strength in all extremities.  Tone is symmetric bilaterally in upper and lower extremities.  (Previously reflexes are symmetric and 2+ in upper extremities and lower extremities. Sensory exam is grossly intact to light touch, pin prick and vibration.) Finger to  nose and heel to shin is without dysmetria.  Romberg is negative.  Gait is normal and the patient is able to do tandem walk and walk on toes and heels.  No change with the exam.    DIAGNOSTICS  RELEVANT LABS  Recent BMP shows elevated creatinine of 1.21.  TSH normal in the past.    OUTSIDE RECORDS  Notes from Dr. Guerra reviewed. Pertinent information is noted in the HPI.     MRI brain-images reviewed with patient.  She does have low-lying cerebellar tonsils though these are very minimal.  No major structural lesions noted.  IMPRESSION:  1.  No evidence of an acute intracranial abnormality.  2.  Low-lying cerebellar tonsils as above.    IMPRESSION/REPORT/PLAN  Intractable chronic migraine without aura and without status migrainosus  Obesity  Concurrent use of multiple antidepressants  Low-lying cerebellar tonsils    This is a 49 year old female with headache suggestive of chronic migraines that are refractory to multiple medications.  MRI brain was negative for structural lesions.  Does show low-lying cerebellar tonsils though most likely these are asymptomatic.  Discussed significance of this with the patient. She has tried Topamax, propanolol, Ajovy without any significant benefit. Ajovy did provide partial benefit though did not completely take care of the headaches.  At this point Ajovy is no longer covered through insurance.  We will hold off on trying Ajovy since Botox is being tried right now.  She has had 60% reduction in headaches and 70% improvement in severity.  Headaches are getting better and better with each session of Botox.  We will continue the higher dose of Botox since headaches are much better.  Continue.    She further complains of 3-week weeks of headache after the Botox.  This was the first time this happened.  No other clear causes.  Will monitor for right now.    Nurtec has been working better as a preventive medication with the headaches coming down even more.  We will continue Nurtec.   "Addition of verapamil has helped and will increase the dose to 180 mg to see if it works even better.  Continue to monitor.  She has tried Topamax and propanolol which were not effective.  Cannot use antidepressants since she is on other antidepressants.  Could consider trial of Depakote though weight gain could be a concern.    She does have some wearing off of the Botox towards the last month and will try trigger point injections today to see if that can get her through the last month.  She does have a lot of shoulder tenderness which can lead to her migraines.  This has been helpful though still has some headaches.  Will inject in the longus miss today.    For abortive therapy she is tried Imitrex and Zomig without any benefit. Over-the-counter medications are not helpful. She finds benefit with opioids but due to risk of addiction we do not want to use that. Fiorinal did not help.  Maxalt, Nurtec, ibuprofen combination has been somewhat helpful.  Relpax was tried which was not effective.  We will switch from Nurtec abortive to preventative.  It has not been very effective for preventative options though she uses more as an abortive and more than 8 times a month.  Continue Nurtec and Maxalt.     Encourage her to keep a log of her headaches.  Return back in 3 months for the next trigger point injections.  1 month for Botox.    -    Ibuprofen 400 mg initially and then Maxalt in 1 hour.  -     Botulinum Toxin Type A (BOTOX) 200 units injection 200 Units  -     verapamil ER (VERELAN) 180 MG 24 hr capsule; Take 1 capsule (180 mg) by mouth at bedtime  -     rizatriptan (MAXALT) 10 MG tablet; Take 1 tablet (10 mg) by mouth at onset of headache for migraine May repeat in 2 hours.  -     rimegepant (NURTEC) 75 MG ODT tablet; Take 1 tablet (75 mg) by mouth every 48 hours  Trigger point injections     Return in about 3 months (around 9/14/2024) for Trigger point injections.     Botox PA  \" The patient has a diagnosis of " "chronic migraines. She has more than 15 headache days a month with each headache lasting at least 4 hours despite use of triptans. Her headaches have been there for over 6 months. She has been screened for medication overuse headaches and she does not have that. She has tried Topamax, Propranolol for 3 months without any significant benefit. Antidepressants cannot be added since she is on multiple antidepressants already. She has tried Ajovy with minimal success. I would request that the Botox be approved for her.    She has had about 50% improvement with 155 units of Botox.  I will request a higher dose of Botox to be approved to see if she can get even further benefit.  70-80% improvement with the higher dose of Botox.  \"    Over 30 minutes were spent coordinating the care for the patient on the day of the encounter.  This includes previsit, during visit and post visit activities as documented above.  Counseling patient.  Refractory problems.  Prescription management.  Multiple problems reviewed.  (Activities include but not inclusive of reviewing chart, reviewing outside records, reviewing labs and imaging study results as well as the images, patient visit time including getting history and exam,  use if applicable, review of test results with the patient and coming up with a plan in a shared model, counseling patient and family, education and answering patient questions, EMR , EMR diagnosis entry and problem list management, medication reconciliation and prescription management if applicable, paperwork if applicable, printing documents and documentation of the visit activities.)        Edi Rojo MD  Neurologist  [facility]  Tel:- [phone]    This note was dictated using voice recognition software.  Any grammatical or context distortions are unintentional and inherent to the software.    "

## 2024-08-05 ENCOUNTER — OFFICE VISIT (OUTPATIENT)
Dept: NEUROLOGY | Facility: CLINIC | Age: 49
End: 2024-08-05
Payer: COMMERCIAL

## 2024-08-05 VITALS
HEART RATE: 83 BPM | WEIGHT: 220.9 LBS | BODY MASS INDEX: 36.76 KG/M2 | DIASTOLIC BLOOD PRESSURE: 83 MMHG | SYSTOLIC BLOOD PRESSURE: 107 MMHG

## 2024-08-05 DIAGNOSIS — G43.719 INTRACTABLE CHRONIC MIGRAINE WITHOUT AURA AND WITHOUT STATUS MIGRAINOSUS: Primary | ICD-10-CM

## 2024-08-05 PROCEDURE — 64615 CHEMODENERV MUSC MIGRAINE: CPT | Performed by: PSYCHIATRY & NEUROLOGY

## 2024-08-05 RX ORDER — CALCIUM CITRATE/VITAMIN D3 315MG-6.25
TABLET ORAL 2 TIMES DAILY
COMMUNITY

## 2024-08-05 ASSESSMENT — HEADACHE IMPACT TEST (HIT 6)
HOW OFTEN HAVE YOU FELT TOO TIRED TO WORK BECAUSE OF YOUR HEADACHES: VERY OFTEN
WHEN YOU HAVE A HEADACHE HOW OFTEN DO YOU WISH YOU COULD LIE DOWN: VERY OFTEN
HIT6 TOTAL SCORE: 66
WHEN YOU HAVE HEADACHES HOW OFTEN IS THE PAIN SEVERE: VERY OFTEN
HOW OFTEN DO HEADACHES LIMIT YOUR DAILY ACTIVITIES: VERY OFTEN
HOW OFTEN HAVE YOU FELT FED UP OR IRRITATED BECAUSE OF YOUR HEADACHES: VERY OFTEN
HOW OFTEN DID HEADACHS LIMIT CONCENTRATION ON WORK OR DAILY ACTIVITY: VERY OFTEN

## 2024-08-05 NOTE — LETTER
8/5/2024      Efra Ames  [address] [id number]      Dear Colleague,    Thank you for referring your patient, Efra Ames, to the [facility] [place]. Please see a copy of my visit note below.    See procedure note.       Again, thank you for allowing me to participate in the care of your patient.        Sincerely,        Edi Rojo MD

## 2024-08-05 NOTE — NURSING NOTE
"Efra Ames is a 49 year old female who presents for:  Chief Complaint   Patient presents with    Botox Migraine     Post surgery on June 20th; gastric sleeve and removal of gallbladder.         Initial Vitals:  /83   Pulse 83   Wt 100.2 kg (220 lb 14.4 oz)   BMI 36.76 kg/m   Estimated body mass index is 36.76 kg/m  as calculated from the following:    Height as of 3/26/24: 1.651 m (5' 5\").    Weight as of this encounter: 100.2 kg (220 lb 14.4 oz).. Body surface area is 2.14 meters squared. BP completed using cuff size: wrist cuff    Last Patient-Answered HIT-6 Questionnaire      8/5/2024     9:00 AM   HIT-6   When you have headaches, how often is the pain severe 11   How often do headaches limit your ability to do usual daily activities including household work, work, school, or social activities? 11   When you have a headache, how often do you wish you could lie down? 11   In the past 4 weeks, how often have you felt too tired to do work or daily activities because of your headaches 11   In the past 4 weeks, how often have you felt fed up or irritated because of your headaches 11   In the past 4 weeks, how often did headaches limit your ability to concentrate on work or daily activities 11   HIT-6 Total Score 66       Jelani Velasco  "

## 2024-08-05 NOTE — PROCEDURES
NEUROLOGY PROCEDURE NOTE  Aug 5, 2024      Chronic migraine Botox injection    CONSENT: The procedure was explained to the patient. The risks and benefits of the procedure and the alternatives were discussed with the patient. The patient was given an opportunity to ask any questions she had about the procedure. A verbal consent was obtained from the patient. A written consent is available in the chart.    PRE-PROCEDURE  Diagnosis: Chronic migraine  Headache frequency before the use of Botox:- 25 headache days per month  Headache frequency with Botox use:- 70-80% improvement in first 2 months, some wearing off in last 3 weeks.     EXAM  GENERAL: Healthy appearing, alert, no acute distress, normal habitus.  NEUROLOGICAL:  Patient is alert with fluent language.  Extraocular movements are intact.  Facial movements are present and symmetric.  No arm drift.    PROCEDURE SUMMARY:   The following muscles were identified after palpating for landmarks and the overlying skin was cleansed with alcohol. These muscles were then injected using a 30 guage- half  inch needle with the following doses of onabotulinumtoxin A. A 5 unit/ 0.1 ml dilution was used for the injections. A 2 x 100 unit vial was used.    Corrugators 5 units each side.  Procerus 5 units.  Frontalis 10 units each side.  Temporalis 25 units each side.  Occipitalis 15 units each side.  Paraspinals 10 units each side.  Trapezius 30 units each side.    Units Injected: 195 Units  Units Discarded: 5 Units  Lot Number and Expiration: [id number]; 9/2026    The patient tolerated the procedure without any immediate complaints and will call the Neurology clinic if she has any problems or side effects from the medication. The patient will follow up in the Neurology clinic as previously decided.      Edi Rojo MD  Neurologist  [facility]  [phone]

## 2024-10-02 DIAGNOSIS — G43.719 INTRACTABLE CHRONIC MIGRAINE WITHOUT AURA AND WITHOUT STATUS MIGRAINOSUS: ICD-10-CM

## 2024-10-02 RX ORDER — VERAPAMIL HYDROCHLORIDE 180 MG/1
180 CAPSULE, EXTENDED RELEASE ORAL AT BEDTIME
Qty: 90 CAPSULE | Refills: 1 | Status: SHIPPED | OUTPATIENT
Start: 2024-10-02

## 2024-10-02 NOTE — TELEPHONE ENCOUNTER
Refill request for: - verapamil ER (VERELAN) 180 MG 24 hr capsule; Take 1 capsule (180 mg) by mouth at bedtime     LOV: 8/5/24  NOV: 10/7/24    90 day supply with 1 refills Medication T'd for review and signature  Mable Rosenthal CMA on 10/2/2024 at 3:03 PM  [facility]

## 2024-10-07 ENCOUNTER — OFFICE VISIT (OUTPATIENT)
Dept: NEUROLOGY | Facility: CLINIC | Age: 49
End: 2024-10-07
Payer: COMMERCIAL

## 2024-10-07 VITALS
WEIGHT: 200 LBS | HEIGHT: 65 IN | DIASTOLIC BLOOD PRESSURE: 81 MMHG | BODY MASS INDEX: 33.32 KG/M2 | HEART RATE: 99 BPM | SYSTOLIC BLOOD PRESSURE: 114 MMHG

## 2024-10-07 DIAGNOSIS — G43.719 INTRACTABLE CHRONIC MIGRAINE WITHOUT AURA AND WITHOUT STATUS MIGRAINOSUS: Primary | ICD-10-CM

## 2024-10-07 DIAGNOSIS — M79.18 MYALGIA, OTHER SITE: ICD-10-CM

## 2024-10-07 PROCEDURE — 20553 NJX 1/MLT TRIGGER POINTS 3/>: CPT | Mod: RT | Performed by: PSYCHIATRY & NEUROLOGY

## 2024-10-07 RX ORDER — LISDEXAMFETAMINE DIMESYLATE 40 MG/1
40 CAPSULE ORAL EVERY MORNING
COMMUNITY

## 2024-10-07 RX ORDER — BUPIVACAINE HYDROCHLORIDE 2.5 MG/ML
4 INJECTION, SOLUTION EPIDURAL; INFILTRATION; INTRACAUDAL ONCE
Status: COMPLETED | OUTPATIENT
Start: 2024-10-07 | End: 2024-10-07

## 2024-10-07 RX ADMIN — BUPIVACAINE HYDROCHLORIDE 10 MG: 2.5 INJECTION, SOLUTION EPIDURAL; INFILTRATION; INTRACAUDAL at 13:05

## 2024-10-07 NOTE — NURSING NOTE
Chief Complaint   Patient presents with    Migraine     Trigger point injections    Headache     Patient states migraines are the same as last visit. Trigger point injections.     Kary Deras on 10/7/2024 at 12:37 PM

## 2024-10-07 NOTE — PROGRESS NOTES
NEUROLOGY OUTPATIENT PROGRESS NOTE   Oct 7, 2024     CHIEF COMPLAINT/REASON FOR VISIT/REASON FOR CONSULT  Patient presents with:  Migraine: Trigger point injections  Headache: Patient states migraines are the same as last visit. Trigger point injections.    REASON FOR CONSULTATION- Headaches    REFERRAL SOURCE  Dr. Guerra  CC Dr. Guerra    HISTORY OF PRESENT ILLNESS  Efra Ames is a 49 year old female seen today for evaluation of headaches. She reports that she has been having headaches since age 12. Previously was seen by Dr. Cuadra. Headaches have been 20 days a month. She was put on Ajovy by Dr. Guerra which reduce the headaches to 10 days a month but currently she is switching insurances and this has been stopped.    Headaches are generally bitemporal. They can also be in the frontal region. Sometimes it is in the middle of the head. Headaches are more of a pressure and occasional throbbing. There is occasional visual auras. She reports associated photophobia phonophobia and nausea. Occasionally will get dizziness and even fall down with the headaches. Denies any clear triggers. Does have mental illness which can affect the headaches. She will get difficulty with thinking when she has a bad headache.    Patient is tried Topamax which she did not tolerate has also been on propanolol which she did not tolerate. Has not tried nortriptyline or amitriptyline. Has been on multiple antidepressants for her depression and anxiety. Currently does not want to add more antidepressants due to weight gain issues and being on multiple antidepressants. For abortive therapy she has been on Zomig which did not work. Has also tried Imitrex which did not work. Has tried over-the-counter Tylenol and ibuprofen without benefit.    10/25/21  Patient returns today.  She reports that she continues to have 25 headache days a month.  She is interested in doing the Botox today but is also interested in trying the Ajovy 1 more time.  She is  comfortable with the needles but feels that the Parminder was helping and works in a different mechanism.  Ajovy was stopped last year because the insurance would not cover it anymore.  She reports that the headaches had improved with Ajovy but not completely.  Had questions about the Maxalt.  Headaches have been lasting multiple days.  Headache does become more tolerable after she takes Maxalt for day 1 and wonders if she can take it on day 2 or not.  Discussed with her about taking the Maxalt as soon as she can and repeating it in 2 hours.  She can take the Maxalt the next day the discussed that it might not be as effective as it was on day 1.  She reports no other new issues.    12/27/21  Patient returns today.  She previously was having 25 headache days a month but now is having 16-18 headache days a month.  Feels that the Botox has reduced the edge and headaches are less severe.  Continues to have headaches on the right side.  Occasionally will get the visual auras.    Is on Maxalt but feels that it occasionally will not work.  Is not using anything with the Maxalt.  Does not feel she has enough medication for the whole month.  Is interested in trying new medications.  Given that she gets limited supply of Maxalt she cannot predict which headaches are going to be severe enough that she wants to use the Maxalt or not.  Does daily using the Maxalt.    We discussed the Ajovy and it was decided that we will hold off on that for right now since she is trying the Botox and trying to medications at the same time might not be very beneficial.    9/29/22  To distribute Botox was done 2 months ago.  She has had 4 sessions of Botox.  Reports that the headaches are 3-4 times a week but she is not having 6 to 7 days of continuous headaches.  Headaches can last the full day.  She generally uses Nurtec, ibuprofen or Maxalt in various combinations depending on the severity of her headaches.  Is interested in trying other  medications to see if the headaches can be under better control.  Botox with each session is making things better.  Denies any side effects to the Botox.  No other new symptoms.    4/3/23  Patient returns today.  She has had about 6 sessions of Botox.  The higher dose of Botox has been more effective.  She is having 5-6 bad headaches in the month.  Does have minor headaches couple of times a week.  Is using ibuprofen for those.  Nurtec works the best for abortive therapy.  Relpax has not been so effective.  Maxalt has been more effective.  Wants to continue with the Maxalt.  No other new symptoms.  Headaches are much less severe compared to before with the Botox though continues to be in the same frequency.  Wants to try the Botox for few more sessions.    10/31/23  Patient returns today.  With the last session of Botox she had headaches every day but these are much less severe compared to before.  More tolerable.  Nurtec is also helping her preventive medication.  Maxalt does work as abortive therapy.  Wants to continue the current plan.  Discussed about adding other preventive medications and willing to try verapamil.  Topamax and propanolol have been used in the past.  No other new concerns.  No other provoking factors other than the weather.    3/26/24  Patient returns today  1.  Feels that the headaches are slowly getting better with the Botox but continues to have the headache still quite frequently.  Maxalt does work as abortive therapy.  She has found benefit with the verapamil as well.  No side effects.  Combination with Nurtec has been beneficial.  Headaches are overall slowly getting better with the treatments.  2.  Has not found any new triggers for headaches.  3.  Is interested in trying trigger point injections today.  No other further concerns.    6/14/24  Patient returns today  1.  Trigger point injections have been helpful.  She wants to continue it.  Wants injections done in the longissimus  muscle  2.  Botox has been helping significantly with the headaches.  The headaches are still very frequent but less severe.  3.  Remains on the verapamil.  Is finding some benefit though cannot say for sure  4.  Nurtec as a preventative is not very helpful though as an abortive medication is very helpful.  Has to use it more than 8 times a month.  Additional Maxalt as needed for more severe headaches  No new medications    10/7/24  Patient returns for  1.  Headaches are still 15-18 headache days a month though they are less severe compared to before.  They are not lasting multiple days.  He is using Maxalt/Nurtec for abortive therapy  2.  Uses verapamil for prevention of headaches in addition to the Botox and trigger point injections.  This finding the combination useful.  Trigger point injections are more helpful than the Botox though wants to continue the Botox as well.  No other new concerns.    Previous history is reviewed and this is unchanged.    PAST MEDICAL/SURGICAL HISTORY  No past medical history on file.  Patient Active Problem List   Diagnosis    Allergic rhinitis    Anisometropia    Anxiety state    Carpal tunnel syndrome    Classical migraine with intractable migraine    Gluten intolerance    History of strabismus surgery    Hypotropia of left eye    Insomnia    Migraine headache    Mild persistent asthma    Moderate episode of recurrent major depressive disorder (H)    Pre-diabetes    Morbid obesity (H)   Significant depression, anxiety, prediabetes, gluten intolerance, high cholesterol    FAMILY HISTORY  Family History   Problem Relation Age of Onset    Migraines Mother    Positive for mother and grandmother with migraines. Daughter with migraines    SOCIAL HISTORY  Social History     Tobacco Use    Smoking status: Never    Smokeless tobacco: Never   Substance Use Topics    Alcohol use: Not Currently       SYSTEMS REVIEW  Twelve-system ROS was done and other than the HPI this was negative except for  "numbness and tingling, balance coordination problems, dizziness, memory loss, anxiety, depression, respiratory problems/asthma.  No new symptoms/issues.    MEDICATIONS  Current Outpatient Medications   Medication Sig Dispense Refill    albuterol (PROAIR HFA/PROVENTIL HFA/VENTOLIN HFA) 108 (90 Base) MCG/ACT inhaler Inhale 2 puffs into the lungs      amphetamine-dextroamphetamine (ADDERALL XR) 20 MG 24 hr capsule Take 40 mg by mouth.      atorvastatin (LIPITOR) 20 MG tablet Take 20 mg by mouth       AUVELITY  MG TBCR Take  mg by mouth 2 times daily      calcium citrate-vitamin D (CITRACAL) 315-6.25 MG-MCG TABS per tablet Take by mouth 2 times daily      cetirizine (ZYRTEC) 10 MG tablet Take 10-20 mg by mouth      cholecalciferol 50 MCG (2000 UT) CAPS 3 daily      DULoxetine (CYMBALTA) 60 MG capsule Take 60 mg by mouth      FLUoxetine (PROZAC) 20 MG capsule Take 40 mg by mouth.      fluticasone-salmeterol (ADVAIR HFA) 230-21 MCG/ACT inhaler Inhale 1 puff into the lungs.      lisdexamfetamine (VYVANSE) 40 MG capsule Take 40 mg by mouth every morning.      montelukast (SINGULAIR) 10 MG tablet Take 10 mg by mouth.      rimegepant (NURTEC) 75 MG ODT tablet Place 1 tablet (75 mg) under the tongue every 48 hours 16 tablet 3    rizatriptan (MAXALT) 10 MG tablet Take 1 tablet (10 mg) by mouth at onset of headache for migraine May repeat in 2 hours. 18 tablet 11    sertraline (ZOLOFT) 100 MG tablet       Specialty Vitamins Products (VITAMINS FOR HAIR) TABS Take 1 tablet by mouth.      traZODone (DESYREL) 100 MG tablet Take 100 mg by mouth      verapamil ER (VERELAN) 180 MG 24 hr capsule TAKE ONE CAPSULE BY MOUTH AT BEDTIME 90 capsule 1     No current facility-administered medications for this visit.        PHYSICAL EXAMINATION  VITALS: /81   Pulse 99   Ht 1.651 m (5' 5\")   Wt 90.7 kg (200 lb)   BMI 33.28 kg/m    GENERAL: Healthy appearing, alert, no acute distress, normal habitus.  CARDIOVASCULAR: " Extremities warm and well perfused. Pulses present.   NEUROLOGICAL:  Patient is awake and grossly oriented to self, place and time.  Attention span is normal.  Memory is grossly intact.  Language is fluent and follows commands appropriately.  Appropriate fund of knowledge. Cranial nerves 2-12 are intact. There is no pronator drift.  Motor exam shows 5/5 strength in all extremities.  Tone is symmetric bilaterally in upper and lower extremities.  (Previously reflexes are symmetric and 2+ in upper extremities and lower extremities. Sensory exam is grossly intact to light touch, pin prick and vibration.) Finger to nose and heel to shin is without dysmetria.  Romberg is negative.  Gait is normal and the patient is able to do tandem walk and walk on toes and heels.  Exam stable.    DIAGNOSTICS  RELEVANT LABS  Recent BMP shows elevated creatinine of 1.21.  TSH normal in the past.    OUTSIDE RECORDS  Notes from Dr. Guerra reviewed. Pertinent information is noted in the HPI.     MRI brain-images reviewed with patient.  She does have low-lying cerebellar tonsils though these are very minimal.  No major structural lesions noted.  IMPRESSION:  1.  No evidence of an acute intracranial abnormality.  2.  Low-lying cerebellar tonsils as above.    IMPRESSION/REPORT/PLAN  Intractable chronic migraine without aura and without status migrainosus  Obesity  Concurrent use of multiple antidepressants  Low-lying cerebellar tonsils    This is a 49 year old female with headache suggestive of chronic migraines that are refractory to multiple medications.  MRI brain was negative for structural lesions.  Does show low-lying cerebellar tonsils though most likely these are asymptomatic.     Prevention of headaches she has tried Topamax, propranolol, Ajovy, Nurtec without significant benefit.  Currently is on Botox, trigger points and verapamil which have been helpful.  She cannot use other antidepressants as she is already on antidepressants.  Could  "consider trial of Depakote there is concerns for weight gain.    For abortive therapy she has tried Imitrex, Zomig, Florinol which have not been helpful.  Combination of Maxalt and Nurtec is helpful and will continue.  Cannot try ibuprofen.  Uses Tylenol for less severe headaches.     Encourage her to keep a log of her headaches.  Return back in 3 months for the next trigger point injections.  1 month for Botox.    -    Ibuprofen 400 mg initially and then Maxalt in 1 hour.  -     Botulinum Toxin Type A (BOTOX) 200 units injection 200 Units  -     verapamil ER (VERELAN) 180 MG 24 hr capsule; Take 1 capsule (180 mg) by mouth at bedtime  -     rizatriptan (MAXALT) 10 MG tablet; Take 1 tablet (10 mg) by mouth at onset of headache for migraine May repeat in 2 hours.  -     rimegepant (NURTEC) 75 MG ODT tablet; Take 1 tablet (75 mg) by mouth every 48 hours  Trigger point injections     Return in about 3 months (around 1/7/2025) for Trigger point injections.     Botox PA  \" The patient has a diagnosis of chronic migraines. She has more than 15 headache days a month with each headache lasting at least 4 hours despite use of triptans. Her headaches have been there for over 6 months. She has been screened for medication overuse headaches and she does not have that. She has tried Topamax, Propranolol for 3 months without any significant benefit. Antidepressants cannot be added since she is on multiple antidepressants already. She has tried Ajovy with minimal success. I would request that the Botox be approved for her.    She has had about 50% improvement with 155 units of Botox.  I will request a higher dose of Botox to be approved to see if she can get even further benefit.  70-80% improvement with the higher dose of Botox.  \"    Over 20 minutes were spent coordinating the care for the patient on the day of the encounter.  This includes previsit, during visit and post visit activities as documented above.  Counseling patient. "  Refractory problem.  Reviewing chart/prescription management.  (Activities include but not inclusive of reviewing chart, reviewing outside records, reviewing labs and imaging study results as well as the images, patient visit time including getting history and exam,  use if applicable, review of test results with the patient and coming up with a plan in a shared model, counseling patient and family, education and answering patient questions, EMR , EMR diagnosis entry and problem list management, medication reconciliation and prescription management if applicable, paperwork if applicable, printing documents and documentation of the visit activities.)        Edi Rojo MD  Neurologist  [facility]  Tel:- [phone]    This note was dictated using voice recognition software.  Any grammatical or context distortions are unintentional and inherent to the software.

## 2024-10-07 NOTE — LETTER
10/7/2024      Efra Ames  [address] [facility] [address]      Dear Colleague,    Thank you for referring your patient, Efra Ames, to the [facility]UROLOG[facility] [place]. Please see a copy of my visit note below.    NEUROLOGY OUTPATIENT PROGRESS NOTE   Oct 7, 2024     CHIEF COMPLAINT/REASON FOR VISIT/REASON FOR CONSULT  Patient presents with:  Migraine: Trigger point injections  Headache: Patient states migraines are the same as last visit. Trigger point injections.    REASON FOR CONSULTATION- Headaches    REFERRAL SOURCE  Dr. Guerra  CC Dr. Guerra    HISTORY OF PRESENT ILLNESS  Efra Ames is a 49 year old female seen today for evaluation of headaches. She reports that she has been having headaches since age 12. Previously was seen by Dr. Cuadra. Headaches have been 20 days a month. She was put on Ajovy by Dr. Guerra which reduce the headaches to 10 days a month but currently she is switching insurances and this has been stopped.    Headaches are generally bitemporal. They can also be in the frontal region. Sometimes it is in the middle of the head. Headaches are more of a pressure and occasional throbbing. There is occasional visual auras. She reports associated photophobia phonophobia and nausea. Occasionally will get dizziness and even fall down with the headaches. Denies any clear triggers. Does have mental illness which can affect the headaches. She will get difficulty with thinking when she has a bad headache.    Patient is tried Topamax which she did not tolerate has also been on propanolol which she did not tolerate. Has not tried nortriptyline or amitriptyline. Has been on multiple antidepressants for her depression and anxiety. Currently does not want to add more antidepressants due to weight gain issues and being on multiple antidepressants. For abortive therapy she has been on Zomig which did not work. Has also tried Imitrex which did not work. Has tried over-the-counter  Tylenol and ibuprofen without benefit.    10/25/21  Patient returns today.  She reports that she continues to have 25 headache days a month.  She is interested in doing the Botox today but is also interested in trying the Ajovy 1 more time.  She is comfortable with the needles but feels that the Parminder was helping and works in a different mechanism.  Ajovy was stopped last year because the insurance would not cover it anymore.  She reports that the headaches had improved with Ajovy but not completely.  Had questions about the Maxalt.  Headaches have been lasting multiple days.  Headache does become more tolerable after she takes Maxalt for day 1 and wonders if she can take it on day 2 or not.  Discussed with her about taking the Maxalt as soon as she can and repeating it in 2 hours.  She can take the Maxalt the next day the discussed that it might not be as effective as it was on day 1.  She reports no other new issues.    12/27/21  Patient returns today.  She previously was having 25 headache days a month but now is having 16-18 headache days a month.  Feels that the Botox has reduced the edge and headaches are less severe.  Continues to have headaches on the right side.  Occasionally will get the visual auras.    Is on Maxalt but feels that it occasionally will not work.  Is not using anything with the Maxalt.  Does not feel she has enough medication for the whole month.  Is interested in trying new medications.  Given that she gets limited supply of Maxalt she cannot predict which headaches are going to be severe enough that she wants to use the Maxalt or not.  Does daily using the Maxalt.    We discussed the Ajovy and it was decided that we will hold off on that for right now since she is trying the Botox and trying to medications at the same time might not be very beneficial.    9/29/22  To distribute Botox was done 2 months ago.  She has had 4 sessions of Botox.  Reports that the headaches are 3-4 times a week  but she is not having 6 to 7 days of continuous headaches.  Headaches can last the full day.  She generally uses Nurtec, ibuprofen or Maxalt in various combinations depending on the severity of her headaches.  Is interested in trying other medications to see if the headaches can be under better control.  Botox with each session is making things better.  Denies any side effects to the Botox.  No other new symptoms.    4/3/23  Patient returns today.  She has had about 6 sessions of Botox.  The higher dose of Botox has been more effective.  She is having 5-6 bad headaches in the month.  Does have minor headaches couple of times a week.  Is using ibuprofen for those.  Nurtec works the best for abortive therapy.  Relpax has not been so effective.  Maxalt has been more effective.  Wants to continue with the Maxalt.  No other new symptoms.  Headaches are much less severe compared to before with the Botox though continues to be in the same frequency.  Wants to try the Botox for few more sessions.    10/31/23  Patient returns today.  With the last session of Botox she had headaches every day but these are much less severe compared to before.  More tolerable.  Nurtec is also helping her preventive medication.  Maxalt does work as abortive therapy.  Wants to continue the current plan.  Discussed about adding other preventive medications and willing to try verapamil.  Topamax and propanolol have been used in the past.  No other new concerns.  No other provoking factors other than the weather.    3/26/24  Patient returns today  1.  Feels that the headaches are slowly getting better with the Botox but continues to have the headache still quite frequently.  Maxalt does work as abortive therapy.  She has found benefit with the verapamil as well.  No side effects.  Combination with Nurtec has been beneficial.  Headaches are overall slowly getting better with the treatments.  2.  Has not found any new triggers for headaches.  3.  Is  interested in trying trigger point injections today.  No other further concerns.    6/14/24  Patient returns today  1.  Trigger point injections have been helpful.  She wants to continue it.  Wants injections done in the longissimus muscle  2.  Botox has been helping significantly with the headaches.  The headaches are still very frequent but less severe.  3.  Remains on the verapamil.  Is finding some benefit though cannot say for sure  4.  Nurtec as a preventative is not very helpful though as an abortive medication is very helpful.  Has to use it more than 8 times a month.  Additional Maxalt as needed for more severe headaches  No new medications    10/7/24  Patient returns for  1.  Headaches are still 15-18 headache days a month though they are less severe compared to before.  They are not lasting multiple days.  He is using Maxalt/Nurtec for abortive therapy  2.  Uses verapamil for prevention of headaches in addition to the Botox and trigger point injections.  This finding the combination useful.  Trigger point injections are more helpful than the Botox though wants to continue the Botox as well.  No other new concerns.    Previous history is reviewed and this is unchanged.    PAST MEDICAL/SURGICAL HISTORY  No past medical history on file.  Patient Active Problem List   Diagnosis     Allergic rhinitis     Anisometropia     Anxiety state     Carpal tunnel syndrome     Classical migraine with intractable migraine     Gluten intolerance     History of strabismus surgery     Hypotropia of left eye     Insomnia     Migraine headache     Mild persistent asthma     Moderate episode of recurrent major depressive disorder (H)     Pre-diabetes     Morbid obesity (H)   Significant depression, anxiety, prediabetes, gluten intolerance, high cholesterol    FAMILY HISTORY  Family History   Problem Relation Age of Onset     Migraines Mother    Positive for mother and grandmother with migraines. Daughter with  migraines    SOCIAL HISTORY  Social History     Tobacco Use     Smoking status: Never     Smokeless tobacco: Never   Substance Use Topics     Alcohol use: Not Currently       SYSTEMS REVIEW  Twelve-system ROS was done and other than the HPI this was negative except for numbness and tingling, balance coordination problems, dizziness, memory loss, anxiety, depression, respiratory problems/asthma.  No new symptoms/issues.    MEDICATIONS  Current Outpatient Medications   Medication Sig Dispense Refill     albuterol (PROAIR HFA/PROVENTIL HFA/VENTOLIN HFA) 108 (90 Base) MCG/ACT inhaler Inhale 2 puffs into the lungs       amphetamine-dextroamphetamine (ADDERALL XR) 20 MG 24 hr capsule Take 40 mg by mouth.       atorvastatin (LIPITOR) 20 MG tablet Take 20 mg by mouth        AUVELITY  MG TBCR Take  mg by mouth 2 times daily       calcium citrate-vitamin D (CITRACAL) 315-6.25 MG-MCG TABS per tablet Take by mouth 2 times daily       cetirizine (ZYRTEC) 10 MG tablet Take 10-20 mg by mouth       cholecalciferol 50 MCG (2000 UT) CAPS 3 daily       DULoxetine (CYMBALTA) 60 MG capsule Take 60 mg by mouth       FLUoxetine (PROZAC) 20 MG capsule Take 40 mg by mouth.       fluticasone-salmeterol (ADVAIR HFA) 230-21 MCG/ACT inhaler Inhale 1 puff into the lungs.       lisdexamfetamine (VYVANSE) 40 MG capsule Take 40 mg by mouth every morning.       montelukast (SINGULAIR) 10 MG tablet Take 10 mg by mouth.       rimegepant (NURTEC) 75 MG ODT tablet Place 1 tablet (75 mg) under the tongue every 48 hours 16 tablet 3     rizatriptan (MAXALT) 10 MG tablet Take 1 tablet (10 mg) by mouth at onset of headache for migraine May repeat in 2 hours. 18 tablet 11     sertraline (ZOLOFT) 100 MG tablet        Specialty Vitamins Products (VITAMINS FOR HAIR) TABS Take 1 tablet by mouth.       traZODone (DESYREL) 100 MG tablet Take 100 mg by mouth       verapamil ER (VERELAN) 180 MG 24 hr capsule TAKE ONE CAPSULE BY MOUTH AT BEDTIME 90 capsule  "1     No current facility-administered medications for this visit.        PHYSICAL EXAMINATION  VITALS: /81   Pulse 99   Ht 1.651 m (5' 5\")   Wt 90.7 kg (200 lb)   BMI 33.28 kg/m    GENERAL: Healthy appearing, alert, no acute distress, normal habitus.  CARDIOVASCULAR: Extremities warm and well perfused. Pulses present.   NEUROLOGICAL:  Patient is awake and grossly oriented to self, place and time.  Attention span is normal.  Memory is grossly intact.  Language is fluent and follows commands appropriately.  Appropriate fund of knowledge. Cranial nerves 2-12 are intact. There is no pronator drift.  Motor exam shows 5/5 strength in all extremities.  Tone is symmetric bilaterally in upper and lower extremities.  (Previously reflexes are symmetric and 2+ in upper extremities and lower extremities. Sensory exam is grossly intact to light touch, pin prick and vibration.) Finger to nose and heel to shin is without dysmetria.  Romberg is negative.  Gait is normal and the patient is able to do tandem walk and walk on toes and heels.  Exam stable.    DIAGNOSTICS  RELEVANT LABS  Recent BMP shows elevated creatinine of 1.21.  TSH normal in the past.    OUTSIDE RECORDS  Notes from Dr. Guerra reviewed. Pertinent information is noted in the HPI.     MRI brain-images reviewed with patient.  She does have low-lying cerebellar tonsils though these are very minimal.  No major structural lesions noted.  IMPRESSION:  1.  No evidence of an acute intracranial abnormality.  2.  Low-lying cerebellar tonsils as above.    IMPRESSION/REPORT/PLAN  Intractable chronic migraine without aura and without status migrainosus  Obesity  Concurrent use of multiple antidepressants  Low-lying cerebellar tonsils    This is a 49 year old female with headache suggestive of chronic migraines that are refractory to multiple medications.  MRI brain was negative for structural lesions.  Does show low-lying cerebellar tonsils though most likely these are " "asymptomatic.     Prevention of headaches she has tried Topamax, propranolol, Ajovy, Nurtec without significant benefit.  Currently is on Botox, trigger points and verapamil which have been helpful.  She cannot use other antidepressants as she is already on antidepressants.  Could consider trial of Depakote there is concerns for weight gain.    For abortive therapy she has tried Imitrex, Zomig, Florinol which have not been helpful.  Combination of Maxalt and Nurtec is helpful and will continue.  Cannot try ibuprofen.  Uses Tylenol for less severe headaches.     Encourage her to keep a log of her headaches.  Return back in 3 months for the next trigger point injections.  1 month for Botox.    -    Ibuprofen 400 mg initially and then Maxalt in 1 hour.  -     Botulinum Toxin Type A (BOTOX) 200 units injection 200 Units  -     verapamil ER (VERELAN) 180 MG 24 hr capsule; Take 1 capsule (180 mg) by mouth at bedtime  -     rizatriptan (MAXALT) 10 MG tablet; Take 1 tablet (10 mg) by mouth at onset of headache for migraine May repeat in 2 hours.  -     rimegepant (NURTEC) 75 MG ODT tablet; Take 1 tablet (75 mg) by mouth every 48 hours  Trigger point injections     Return in about 3 months (around 1/7/2025) for Trigger point injections.     Botox PA  \" The patient has a diagnosis of chronic migraines. She has more than 15 headache days a month with each headache lasting at least 4 hours despite use of triptans. Her headaches have been there for over 6 months. She has been screened for medication overuse headaches and she does not have that. She has tried Topamax, Propranolol for 3 months without any significant benefit. Antidepressants cannot be added since she is on multiple antidepressants already. She has tried Ajovy with minimal success. I would request that the Botox be approved for her.    She has had about 50% improvement with 155 units of Botox.  I will request a higher dose of Botox to be approved to see if she can " "get even further benefit.  70-80% improvement with the higher dose of Botox.  \"    Over 20 minutes were spent coordinating the care for the patient on the day of the encounter.  This includes previsit, during visit and post visit activities as documented above.  Counseling patient.  Refractory problem.  Reviewing chart/prescription management.  (Activities include but not inclusive of reviewing chart, reviewing outside records, reviewing labs and imaging study results as well as the images, patient visit time including getting history and exam,  use if applicable, review of test results with the patient and coming up with a plan in a shared model, counseling patient and family, education and answering patient questions, EMR , EMR diagnosis entry and problem list management, medication reconciliation and prescription management if applicable, paperwork if applicable, printing documents and documentation of the visit activities.)        Edi Rojo MD  Neurologist  [facility]  Tel:- [phone]    This note was dictated using voice recognition software.  Any grammatical or context distortions are unintentional and inherent to the software.      Again, thank you for allowing me to participate in the care of your patient.        Sincerely,        Edi Rojo MD  "

## 2024-10-14 NOTE — PROCEDURES
NEUROLOGY PROCEDURE NOTE  Oct 7, 2024      Trigger Point injection    CONSENT: The procedure was explained to the patient. The risks and benefits of the procedure and the alternatives were discussed with the patient. The patient was given an opportunity to ask any questions they had about the procedure. A verbal consent was obtained from the patient. A written consent is available in the chart.    PRE-PROCEDURE  Diagnosis: Neck pain/Trigger point/Migraine Headaches/Myositis    EXAM  GENERAL: Healthy appearing, alert, no acute distress, normal habitus.  NEUROLOGICAL:  Patient is alert with fluent language.  Extraocular movements are intact.  Facial movements are present and symmetric.  No arm drift.    PROCEDURE SUMMARY:   The following trigger points were identified after palpating for landmarks and the overlying skin was cleansed with alcohol. These muscles were then injected using a 30 guage- half inch needle with the following doses of bupivacaine 0.25% preservative-free.     Left trapezius 1 ml  Left medial trapezius 0.5 mL  Right trapezius 1 ml  Right medial trapezius 0.5 mL  Left longissimus 0.5 mL  Right longissimus 0.5 mL    Lot Number and Expiration: [id number]; 01/25    The patient tolerated the procedure without any immediate complaints and will call the Neurology clinic if she has any problems or side effects from the medication.  The patient noticed some improvement in the pain right after the injection.  The patient will follow up in the Neurology clinic as previously decided.      Edi Rojo MD  Neurologist  [facility]  [phone]

## 2024-11-11 ENCOUNTER — OFFICE VISIT (OUTPATIENT)
Dept: NEUROLOGY | Facility: CLINIC | Age: 49
End: 2024-11-11
Payer: COMMERCIAL

## 2024-11-11 VITALS
DIASTOLIC BLOOD PRESSURE: 68 MMHG | WEIGHT: 200 LBS | HEART RATE: 95 BPM | BODY MASS INDEX: 33.32 KG/M2 | SYSTOLIC BLOOD PRESSURE: 92 MMHG | HEIGHT: 65 IN

## 2024-11-11 DIAGNOSIS — G43.719 INTRACTABLE CHRONIC MIGRAINE WITHOUT AURA AND WITHOUT STATUS MIGRAINOSUS: Primary | ICD-10-CM

## 2024-11-11 PROCEDURE — 64615 CHEMODENERV MUSC MIGRAINE: CPT | Performed by: PSYCHIATRY & NEUROLOGY

## 2024-11-11 NOTE — LETTER
11/11/2024      Efra Ames  [address] [facility] [address]      Dear Colleague,    Thank you for referring your patient, Efra Ames, to the [facility] NEUROLOGY CLINIC [place]. Please see a copy of my visit note below.    See procedure note.       Again, thank you for allowing me to participate in the care of your patient.        Sincerely,        Edi Rojo MD

## 2024-11-11 NOTE — NURSING NOTE
Chief Complaint   Patient presents with    Botox Migraine     Mable REED CMA on 11/11/2024 at 8:31 AM  [facility]

## 2024-11-11 NOTE — PROCEDURES
NEUROLOGY PROCEDURE NOTE  Nov 11, 2024      Chronic migraine Botox injection    CONSENT: The procedure was explained to the patient. The risks and benefits of the procedure and the alternatives were discussed with the patient. The patient was given an opportunity to ask any questions she had about the procedure. A verbal consent was obtained from the patient. A written consent is available in the chart.    PRE-PROCEDURE  Diagnosis: Chronic migraine  Headache frequency before the use of Botox:- 25 headache days per month  Headache frequency with Botox use:- 70-80% improvement in first 2 months, some wearing off in last 3 weeks.     EXAM  GENERAL: Healthy appearing, alert, no acute distress, normal habitus.  NEUROLOGICAL:  Patient is alert with fluent language.  Extraocular movements are intact.  Facial movements are present and symmetric.  No arm drift.    PROCEDURE SUMMARY:   The following muscles were identified after palpating for landmarks and the overlying skin was cleansed with alcohol. These muscles were then injected using a 30 guage- half  inch needle with the following doses of onabotulinumtoxin A. A 5 unit/ 0.1 ml dilution was used for the injections. A 2 x 100 unit vial was used.    Corrugators 5 units each side.  Procerus 5 units.  Frontalis 10 units each side.  Temporalis 20 units each side.  Occipitalis 15 units each side.  Paraspinals 10 units each side.  Trapezius 15 units each side.    Units Injected: 155 Units  Units Discarded: 45 Units  Lot Number and Expiration: [id number]; 3/2027    The patient tolerated the procedure without any immediate complaints and will call the Neurology clinic if she has any problems or side effects from the medication. The patient will follow up in the Neurology clinic as previously decided.      Edi Rojo MD  Neurologist  [facility]  [phone]

## 2024-11-15 ENCOUNTER — TELEPHONE (OUTPATIENT)
Dept: NEUROLOGY | Facility: CLINIC | Age: 49
End: 2024-11-15
Payer: COMMERCIAL

## 2024-11-15 NOTE — LETTER
To whomever it may concern,      Efra Ames suffers from chronic migraines.  She has received Botox at 155 units every 3 months for chronic migraines in the past.  This has not been completely effective.  He had increased the dose 195 units every 3 months which has been more effective in controlling her headaches.     In the PREEMPT trial,  dose of 155 to 195 units of Botox was allowed for patients per the discretion of the neurologist (*).  I would request that the full 195 units of Botox be approved to provide maximum benefit from this treatment option.  This would be medically necessary.        Sincerely,        Edi Rojo MD  Neurologist  [facility]rology [facility]  Tel:- [phone]        * Method of Injection of OnabotulinumtoxinA for Chronic Migraine: A Safe, Well-Tolerated, and Effective Treatment Paradigm Based on the PREEMPT Clinical Program  Vince Nelson MD,Darryn Lucas MD, FACP,Hunter Morse MD,Teri Bell MD,Cary Lind PharmD, PhD,Dio Choudhury MD, FACS

## 2025-01-06 ENCOUNTER — OFFICE VISIT (OUTPATIENT)
Dept: NEUROLOGY | Facility: CLINIC | Age: 50
End: 2025-01-06
Payer: COMMERCIAL

## 2025-01-06 VITALS
HEIGHT: 65 IN | DIASTOLIC BLOOD PRESSURE: 89 MMHG | BODY MASS INDEX: 29.99 KG/M2 | WEIGHT: 180 LBS | SYSTOLIC BLOOD PRESSURE: 128 MMHG | HEART RATE: 109 BPM

## 2025-01-06 DIAGNOSIS — G43.719 INTRACTABLE CHRONIC MIGRAINE WITHOUT AURA AND WITHOUT STATUS MIGRAINOSUS: Primary | ICD-10-CM

## 2025-01-06 DIAGNOSIS — M79.18 MYALGIA, OTHER SITE: ICD-10-CM

## 2025-01-06 PROCEDURE — 20553 NJX 1/MLT TRIGGER POINTS 3/>: CPT | Performed by: PSYCHIATRY & NEUROLOGY

## 2025-01-06 PROCEDURE — 99214 OFFICE O/P EST MOD 30 MIN: CPT | Mod: 25 | Performed by: PSYCHIATRY & NEUROLOGY

## 2025-01-06 RX ORDER — BUPIVACAINE HYDROCHLORIDE 2.5 MG/ML
6 INJECTION, SOLUTION EPIDURAL; INFILTRATION; INTRACAUDAL ONCE
Status: COMPLETED | OUTPATIENT
Start: 2025-01-06 | End: 2025-01-06

## 2025-01-06 RX ORDER — KETOROLAC TROMETHAMINE 10 MG/1
TABLET, FILM COATED ORAL
Qty: 20 TABLET | Refills: 1 | Status: SHIPPED | OUTPATIENT
Start: 2025-01-06

## 2025-01-06 RX ADMIN — BUPIVACAINE HYDROCHLORIDE 15 MG: 2.5 INJECTION, SOLUTION EPIDURAL; INFILTRATION; INTRACAUDAL at 13:14

## 2025-01-06 NOTE — LETTER
1/6/2025      Efra Ames  [address] [facility] [address] [id number]      Dear Colleague,    Thank you for referring your patient, Efra Ames, to the [facility] NEUROLOGY CLINIC [place]. Please see a copy of my visit note below.    NEUROLOGY OUTPATIENT PROGRESS NOTE   Jan 6, 2025     CHIEF COMPLAINT/REASON FOR VISIT/REASON FOR CONSULT  Patient presents with:  Migraine: Trigger point injections    REASON FOR CONSULTATION- Headaches    REFERRAL SOURCE  Dr. Guerra  CC Dr. Guerra    HISTORY OF PRESENT ILLNESS  Efra Ames is a 49 year old female seen today for evaluation of headaches. She reports that she has been having headaches since age 12. Previously was seen by Dr. Cuadra. Headaches have been 20 days a month. She was put on Ajovy by Dr. Guerra which reduce the headaches to 10 days a month but currently she is switching insurances and this has been stopped.    Headaches are generally bitemporal. They can also be in the frontal region. Sometimes it is in the middle of the head. Headaches are more of a pressure and occasional throbbing. There is occasional visual auras. She reports associated photophobia phonophobia and nausea. Occasionally will get dizziness and even fall down with the headaches. Denies any clear triggers. Does have mental illness which can affect the headaches. She will get difficulty with thinking when she has a bad headache.    Patient is tried Topamax which she did not tolerate has also been on propanolol which she did not tolerate. Has not tried nortriptyline or amitriptyline. Has been on multiple antidepressants for her depression and anxiety. Currently does not want to add more antidepressants due to weight gain issues and being on multiple antidepressants. For abortive therapy she has been on Zomig which did not work. Has also tried Imitrex which did not work. Has tried over-the-counter Tylenol and ibuprofen without benefit.    10/25/21  Patient returns today.  She reports that  she continues to have 25 headache days a month.  She is interested in doing the Botox today but is also interested in trying the Ajovy 1 more time.  She is comfortable with the needles but feels that the Parminder was helping and works in a different mechanism.  Ajovy was stopped last year because the insurance would not cover it anymore.  She reports that the headaches had improved with Ajovy but not completely.  Had questions about the Maxalt.  Headaches have been lasting multiple days.  Headache does become more tolerable after she takes Maxalt for day 1 and wonders if she can take it on day 2 or not.  Discussed with her about taking the Maxalt as soon as she can and repeating it in 2 hours.  She can take the Maxalt the next day the discussed that it might not be as effective as it was on day 1.  She reports no other new issues.    12/27/21  Patient returns today.  She previously was having 25 headache days a month but now is having 16-18 headache days a month.  Feels that the Botox has reduced the edge and headaches are less severe.  Continues to have headaches on the right side.  Occasionally will get the visual auras.    Is on Maxalt but feels that it occasionally will not work.  Is not using anything with the Maxalt.  Does not feel she has enough medication for the whole month.  Is interested in trying new medications.  Given that she gets limited supply of Maxalt she cannot predict which headaches are going to be severe enough that she wants to use the Maxalt or not.  Does daily using the Maxalt.    We discussed the Ajovy and it was decided that we will hold off on that for right now since she is trying the Botox and trying to medications at the same time might not be very beneficial.    9/29/22  To distribute Botox was done 2 months ago.  She has had 4 sessions of Botox.  Reports that the headaches are 3-4 times a week but she is not having 6 to 7 days of continuous headaches.  Headaches can last the full day.   She generally uses Nurtec, ibuprofen or Maxalt in various combinations depending on the severity of her headaches.  Is interested in trying other medications to see if the headaches can be under better control.  Botox with each session is making things better.  Denies any side effects to the Botox.  No other new symptoms.    4/3/23  Patient returns today.  She has had about 6 sessions of Botox.  The higher dose of Botox has been more effective.  She is having 5-6 bad headaches in the month.  Does have minor headaches couple of times a week.  Is using ibuprofen for those.  Nurtec works the best for abortive therapy.  Relpax has not been so effective.  Maxalt has been more effective.  Wants to continue with the Maxalt.  No other new symptoms.  Headaches are much less severe compared to before with the Botox though continues to be in the same frequency.  Wants to try the Botox for few more sessions.    10/31/23  Patient returns today.  With the last session of Botox she had headaches every day but these are much less severe compared to before.  More tolerable.  Nurtec is also helping her preventive medication.  Maxalt does work as abortive therapy.  Wants to continue the current plan.  Discussed about adding other preventive medications and willing to try verapamil.  Topamax and propanolol have been used in the past.  No other new concerns.  No other provoking factors other than the weather.    3/26/24  Patient returns today  1.  Feels that the headaches are slowly getting better with the Botox but continues to have the headache still quite frequently.  Maxalt does work as abortive therapy.  She has found benefit with the verapamil as well.  No side effects.  Combination with Nurtec has been beneficial.  Headaches are overall slowly getting better with the treatments.  2.  Has not found any new triggers for headaches.  3.  Is interested in trying trigger point injections today.  No other further  concerns.    6/14/24  Patient returns today  1.  Trigger point injections have been helpful.  She wants to continue it.  Wants injections done in the longissimus muscle  2.  Botox has been helping significantly with the headaches.  The headaches are still very frequent but less severe.  3.  Remains on the verapamil.  Is finding some benefit though cannot say for sure  4.  Nurtec as a preventative is not very helpful though as an abortive medication is very helpful.  Has to use it more than 8 times a month.  Additional Maxalt as needed for more severe headaches  No new medications    10/7/24  Patient returns for  1.  Headaches are still 15-18 headache days a month though they are less severe compared to before.  They are not lasting multiple days.  He is using Maxalt/Nurtec for abortive therapy  2.  Uses verapamil for prevention of headaches in addition to the Botox and trigger point injections.  This finding the combination useful.  Trigger point injections are more helpful than the Botox though wants to continue the Botox as well.  No other new concerns.    1/6/24  Patient returns today  1.  Headaches are about 3 times a week.  They are much less severe compared to before she has had 2 episodes of really severe headaches lasting for 2 weeks.  Her Maxalt and Nurtec did not work this time.  Verapamil has been helpful.  Botox and trigger point are also helping.  Feels that the headaches are more in the frontal region.  Trigger points prevent the headaches in the neck region.  She wants to try a higher dose in the trapezius today.  No other new concerns.    Previous history is reviewed and this is unchanged.    PAST MEDICAL/SURGICAL HISTORY  No past medical history on file.  Patient Active Problem List   Diagnosis     Allergic rhinitis     Anisometropia     Anxiety state     Carpal tunnel syndrome     Classical migraine with intractable migraine     Gluten intolerance     History of strabismus surgery     Hypotropia of  left eye     Insomnia     Migraine headache     Mild persistent asthma     Moderate episode of recurrent major depressive disorder (H)     Pre-diabetes     Morbid obesity (H)   Significant depression, anxiety, prediabetes, gluten intolerance, high cholesterol    FAMILY HISTORY  Family History   Problem Relation Age of Onset     Migraines Mother    Positive for mother and grandmother with migraines. Daughter with migraines    SOCIAL HISTORY  Social History     Tobacco Use     Smoking status: Never     Smokeless tobacco: Never   Substance Use Topics     Alcohol use: Not Currently       SYSTEMS REVIEW  Twelve-system ROS was done and other than the HPI this was negative except for numbness and tingling, balance coordination problems, dizziness, memory loss, anxiety, depression, respiratory problems/asthma.  No new symptoms/issues.    MEDICATIONS  Current Outpatient Medications   Medication Sig Dispense Refill     albuterol (PROAIR HFA/PROVENTIL HFA/VENTOLIN HFA) 108 (90 Base) MCG/ACT inhaler Inhale 2 puffs into the lungs       atorvastatin (LIPITOR) 20 MG tablet Take 20 mg by mouth        AUVELITY  MG TBCR Take  mg by mouth 2 times daily       calcium citrate-vitamin D (CITRACAL) 315-6.25 MG-MCG TABS per tablet Take by mouth 2 times daily       cetirizine (ZYRTEC) 10 MG tablet Take 10-20 mg by mouth       cholecalciferol 50 MCG (2000 UT) CAPS 3 daily       DULoxetine (CYMBALTA) 60 MG capsule Take 60 mg by mouth       fluticasone-salmeterol (ADVAIR HFA) 230-21 MCG/ACT inhaler Inhale 1 puff into the lungs.       ketorolac (TORADOL) 10 MG tablet PRN for headaches. Not more than 1 tablet/day or 3 tablets/week or more than 6 times a month. 20 tablet 1     lisdexamfetamine (VYVANSE) 40 MG capsule Take 40 mg by mouth every morning.       rimegepant (NURTEC) 75 MG ODT tablet Place 1 tablet (75 mg) under the tongue every 48 hours 16 tablet 3     rizatriptan (MAXALT) 10 MG tablet Take 1 tablet (10 mg) by mouth at  "onset of headache for migraine May repeat in 2 hours. 18 tablet 11     traZODone (DESYREL) 100 MG tablet Take 100 mg by mouth       verapamil ER (VERELAN) 180 MG 24 hr capsule TAKE ONE CAPSULE BY MOUTH AT BEDTIME 90 capsule 1     No current facility-administered medications for this visit.        PHYSICAL EXAMINATION  VITALS: /89 (BP Location: Left arm, Patient Position: Sitting)   Pulse 109   Ht 1.651 m (5' 5\")   Wt 81.6 kg (180 lb)   BMI 29.95 kg/m    GENERAL: Healthy appearing, alert, no acute distress, normal habitus.  CARDIOVASCULAR: Extremities warm and well perfused. Pulses present.   NEUROLOGICAL:  Patient is awake and grossly oriented to self, place and time.  Attention span is normal.  Memory is grossly intact.  Language is fluent and follows commands appropriately.  Appropriate fund of knowledge. Cranial nerves 2-12 are intact. There is no pronator drift.  Motor exam shows 5/5 strength in all extremities.  Tone is symmetric bilaterally in upper and lower extremities.  (Previously reflexes are symmetric and 2+ in upper extremities and lower extremities. Sensory exam is grossly intact to light touch, pin prick and vibration.) Finger to nose and heel to shin is without dysmetria.  Romberg is negative.  Gait is normal and the patient is able to do tandem walk and walk on toes and heels.  No change.    DIAGNOSTICS  RELEVANT LABS  Recent BMP shows elevated creatinine of 1.21.  TSH normal in the past.    OUTSIDE RECORDS  Notes from Dr. Guerra reviewed. Pertinent information is noted in the HPI.     MRI brain-images reviewed with patient.  She does have low-lying cerebellar tonsils though these are very minimal.  No major structural lesions noted.  IMPRESSION:  1.  No evidence of an acute intracranial abnormality.  2.  Low-lying cerebellar tonsils as above.    IMPRESSION/REPORT/PLAN  Intractable chronic migraine without aura and without status migrainosus  Obesity  Concurrent use of multiple " antidepressants  Low-lying cerebellar tonsils    This is a 49 year old female with headache suggestive of chronic migraines that are refractory to multiple medications.  MRI brain was negative for structural lesions.  Does show low-lying cerebellar tonsils though most likely these are asymptomatic.     Prevention of headaches she has tried Topamax, propranolol, Ajovy, Nurtec without significant benefit.  Currently is on Botox, trigger points and verapamil which have been helpful.  She cannot use other antidepressants as she is already on antidepressants.  Could consider trial of Depakote there is concerns for weight gain.  Will trial a bit higher dose of trigger points in the trapezius as that has been helpful and will see if that helps prevent the multiday headaches.  Previously 200 units of Botox was approved but now insurance is only approving the 155 which might have made the headaches worse.    For abortive therapy she has tried Imitrex, Zomig, Florinol which have not been helpful.  Combination of Maxalt and Nurtec is helpful and will continue.  Cannot try ibuprofen.  Uses Tylenol for less severe headaches.  Will add Toradol as a rescue medication.  Could consider steroids.     Encourage her to keep a log of her headaches.  Return back in 3 months for the next trigger point injections.  1 month for Botox.    -    Ibuprofen 400 mg initially and then Maxalt in 1 hour.  -     Botulinum Toxin Type A (BOTOX) 200 units injection 200 Units  -     verapamil ER (VERELAN) 180 MG 24 hr capsule; Take 1 capsule (180 mg) by mouth at bedtime  -     rizatriptan (MAXALT) 10 MG tablet; Take 1 tablet (10 mg) by mouth at onset of headache for migraine May repeat in 2 hours.  -     rimegepant (NURTEC) 75 MG ODT tablet; Take 1 tablet (75 mg) by mouth every 48 hours  Trigger point injections    -     ketorolac (TORADOL) 10 MG tablet; PRN for headaches. Not more than 1 tablet/day or 3 tablets/week or more than 6 times a month.  -      "INJECTION SNGL/MULT TRIGGER POINT, >3 MUSCLES  -     bupivacaine (MARCAINE) 0.25% preservative free injection       Return in about 3 months (around 4/6/2025) for In-Clinic Visit (must), Trigger point injections.     Botox PA  \" The patient has a diagnosis of chronic migraines. She has more than 15 headache days a month with each headache lasting at least 4 hours despite use of triptans. Her headaches have been there for over 6 months. She has been screened for medication overuse headaches and she does not have that. She has tried Topamax, Propranolol for 3 months without any significant benefit. Antidepressants cannot be added since she is on multiple antidepressants already. She has tried Ajovy with minimal success. I would request that the Botox be approved for her.    She has had about 50% improvement with 155 units of Botox.  I will request a higher dose of Botox to be approved to see if she can get even further benefit.  70-80% improvement with the higher dose of Botox.  \"    Over 30 minutes were spent coordinating the care for the patient on the day of the encounter.  This includes previsit, during visit and post visit activities as documented above.  Counseling patient.  Prescription management.  Refractory problem.  (Activities include but not inclusive of reviewing chart, reviewing outside records, reviewing labs and imaging study results as well as the images, patient visit time including getting history and exam,  use if applicable, review of test results with the patient and coming up with a plan in a shared model, counseling patient and family, education and answering patient questions, EMR , EMR diagnosis entry and problem list management, medication reconciliation and prescription management if applicable, paperwork if applicable, printing documents and documentation of the visit activities.)        Edi Rojo MD  Neurologist  [facility] Neurology [facility] - " [place]  Tel:- [phone]    This note was dictated using voice recognition software.  Any grammatical or context distortions are unintentional and inherent to the software.      Again, thank you for allowing me to participate in the care of your patient.        Sincerely,        Edi Rojo MD    Electronically signed

## 2025-01-06 NOTE — PROCEDURES
NEUROLOGY PROCEDURE NOTE  Jan 6, 2025      Trigger Point injection    CONSENT: The procedure was explained to the patient. The risks and benefits of the procedure and the alternatives were discussed with the patient. The patient was given an opportunity to ask any questions they had about the procedure. A verbal consent was obtained from the patient. A written consent is available in the chart.    PRE-PROCEDURE  Diagnosis: Neck pain/Trigger point/Migraine Headaches/Myositis    EXAM  GENERAL: Healthy appearing, alert, no acute distress, normal habitus.  NEUROLOGICAL:  Patient is alert with fluent language.  Extraocular movements are intact.  Facial movements are present and symmetric.  No arm drift.    PROCEDURE SUMMARY:   The following trigger points were identified after palpating for landmarks and the overlying skin was cleansed with alcohol. These muscles were then injected using a 30 guage- half inch needle with the following doses of bupivacaine 0.25% preservative-free.     Left trapezius 1 ml  Left medial trapezius 1 mL  Right trapezius 1 ml  Right medial trapezius 1 mL  Left longissimus 0.5 mL  Right longissimus 0.5 mL  Left cervical trapezius 0.5 mL  Right cervical trapezius 0.5 mL    Total injected 6 mL  Lot Number and Expiration: [id number]; 11/25    The patient tolerated the procedure without any immediate complaints and will call the Neurology clinic if she has any problems or side effects from the medication.  The patient noticed some improvement in the pain right after the injection.  The patient will follow up in the Neurology clinic as previously decided.      Edi Rojo MD  Neurologist  [facility] Neurology [facility]  [phone]

## 2025-01-06 NOTE — NURSING NOTE
Chief Complaint   Patient presents with    Migraine     Trigger point injections     Margaret Luna MA

## 2025-01-06 NOTE — PROGRESS NOTES
NEUROLOGY OUTPATIENT PROGRESS NOTE   Jan 6, 2025     CHIEF COMPLAINT/REASON FOR VISIT/REASON FOR CONSULT  Patient presents with:  Migraine: Trigger point injections    REASON FOR CONSULTATION- Headaches    REFERRAL SOURCE  Dr. Guerra  CC Dr. Guerra    HISTORY OF PRESENT ILLNESS  Efra Ames is a 49 year old female seen today for evaluation of headaches. She reports that she has been having headaches since age 12. Previously was seen by Dr. Cuadra. Headaches have been 20 days a month. She was put on Ajovy by Dr. Guerra which reduce the headaches to 10 days a month but currently she is switching insurances and this has been stopped.    Headaches are generally bitemporal. They can also be in the frontal region. Sometimes it is in the middle of the head. Headaches are more of a pressure and occasional throbbing. There is occasional visual auras. She reports associated photophobia phonophobia and nausea. Occasionally will get dizziness and even fall down with the headaches. Denies any clear triggers. Does have mental illness which can affect the headaches. She will get difficulty with thinking when she has a bad headache.    Patient is tried Topamax which she did not tolerate has also been on propanolol which she did not tolerate. Has not tried nortriptyline or amitriptyline. Has been on multiple antidepressants for her depression and anxiety. Currently does not want to add more antidepressants due to weight gain issues and being on multiple antidepressants. For abortive therapy she has been on Zomig which did not work. Has also tried Imitrex which did not work. Has tried over-the-counter Tylenol and ibuprofen without benefit.    10/25/21  Patient returns today.  She reports that she continues to have 25 headache days a month.  She is interested in doing the Botox today but is also interested in trying the Ajovy 1 more time.  She is comfortable with the needles but feels that the Parminder was helping and works in a different  mechanism.  Ajovy was stopped last year because the insurance would not cover it anymore.  She reports that the headaches had improved with Ajovy but not completely.  Had questions about the Maxalt.  Headaches have been lasting multiple days.  Headache does become more tolerable after she takes Maxalt for day 1 and wonders if she can take it on day 2 or not.  Discussed with her about taking the Maxalt as soon as she can and repeating it in 2 hours.  She can take the Maxalt the next day the discussed that it might not be as effective as it was on day 1.  She reports no other new issues.    12/27/21  Patient returns today.  She previously was having 25 headache days a month but now is having 16-18 headache days a month.  Feels that the Botox has reduced the edge and headaches are less severe.  Continues to have headaches on the right side.  Occasionally will get the visual auras.    Is on Maxalt but feels that it occasionally will not work.  Is not using anything with the Maxalt.  Does not feel she has enough medication for the whole month.  Is interested in trying new medications.  Given that she gets limited supply of Maxalt she cannot predict which headaches are going to be severe enough that she wants to use the Maxalt or not.  Does daily using the Maxalt.    We discussed the Ajovy and it was decided that we will hold off on that for right now since she is trying the Botox and trying to medications at the same time might not be very beneficial.    9/29/22  To distribute Botox was done 2 months ago.  She has had 4 sessions of Botox.  Reports that the headaches are 3-4 times a week but she is not having 6 to 7 days of continuous headaches.  Headaches can last the full day.  She generally uses Nurtec, ibuprofen or Maxalt in various combinations depending on the severity of her headaches.  Is interested in trying other medications to see if the headaches can be under better control.  Botox with each session is making  things better.  Denies any side effects to the Botox.  No other new symptoms.    4/3/23  Patient returns today.  She has had about 6 sessions of Botox.  The higher dose of Botox has been more effective.  She is having 5-6 bad headaches in the month.  Does have minor headaches couple of times a week.  Is using ibuprofen for those.  Nurtec works the best for abortive therapy.  Relpax has not been so effective.  Maxalt has been more effective.  Wants to continue with the Maxalt.  No other new symptoms.  Headaches are much less severe compared to before with the Botox though continues to be in the same frequency.  Wants to try the Botox for few more sessions.    10/31/23  Patient returns today.  With the last session of Botox she had headaches every day but these are much less severe compared to before.  More tolerable.  Nurtec is also helping her preventive medication.  Maxalt does work as abortive therapy.  Wants to continue the current plan.  Discussed about adding other preventive medications and willing to try verapamil.  Topamax and propanolol have been used in the past.  No other new concerns.  No other provoking factors other than the weather.    3/26/24  Patient returns today  1.  Feels that the headaches are slowly getting better with the Botox but continues to have the headache still quite frequently.  Maxalt does work as abortive therapy.  She has found benefit with the verapamil as well.  No side effects.  Combination with Nurtec has been beneficial.  Headaches are overall slowly getting better with the treatments.  2.  Has not found any new triggers for headaches.  3.  Is interested in trying trigger point injections today.  No other further concerns.    6/14/24  Patient returns today  1.  Trigger point injections have been helpful.  She wants to continue it.  Wants injections done in the longissimus muscle  2.  Botox has been helping significantly with the headaches.  The headaches are still very frequent  but less severe.  3.  Remains on the verapamil.  Is finding some benefit though cannot say for sure  4.  Nurtec as a preventative is not very helpful though as an abortive medication is very helpful.  Has to use it more than 8 times a month.  Additional Maxalt as needed for more severe headaches  No new medications    10/7/24  Patient returns for  1.  Headaches are still 15-18 headache days a month though they are less severe compared to before.  They are not lasting multiple days.  He is using Maxalt/Nurtec for abortive therapy  2.  Uses verapamil for prevention of headaches in addition to the Botox and trigger point injections.  This finding the combination useful.  Trigger point injections are more helpful than the Botox though wants to continue the Botox as well.  No other new concerns.    1/6/24  Patient returns today  1.  Headaches are about 3 times a week.  They are much less severe compared to before she has had 2 episodes of really severe headaches lasting for 2 weeks.  Her Maxalt and Nurtec did not work this time.  Verapamil has been helpful.  Botox and trigger point are also helping.  Feels that the headaches are more in the frontal region.  Trigger points prevent the headaches in the neck region.  She wants to try a higher dose in the trapezius today.  No other new concerns.    Previous history is reviewed and this is unchanged.    PAST MEDICAL/SURGICAL HISTORY  No past medical history on file.  Patient Active Problem List   Diagnosis    Allergic rhinitis    Anisometropia    Anxiety state    Carpal tunnel syndrome    Classical migraine with intractable migraine    Gluten intolerance    History of strabismus surgery    Hypotropia of left eye    Insomnia    Migraine headache    Mild persistent asthma    Moderate episode of recurrent major depressive disorder (H)    Pre-diabetes    Morbid obesity (H)   Significant depression, anxiety, prediabetes, gluten intolerance, high cholesterol    FAMILY  HISTORY  Family History   Problem Relation Age of Onset    Migraines Mother    Positive for mother and grandmother with migraines. Daughter with migraines    SOCIAL HISTORY  Social History     Tobacco Use    Smoking status: Never    Smokeless tobacco: Never   Substance Use Topics    Alcohol use: Not Currently       SYSTEMS REVIEW  Twelve-system ROS was done and other than the HPI this was negative except for numbness and tingling, balance coordination problems, dizziness, memory loss, anxiety, depression, respiratory problems/asthma.  No new symptoms/issues.    MEDICATIONS  Current Outpatient Medications   Medication Sig Dispense Refill    albuterol (PROAIR HFA/PROVENTIL HFA/VENTOLIN HFA) 108 (90 Base) MCG/ACT inhaler Inhale 2 puffs into the lungs      atorvastatin (LIPITOR) 20 MG tablet Take 20 mg by mouth       AUVELITY  MG TBCR Take  mg by mouth 2 times daily      calcium citrate-vitamin D (CITRACAL) 315-6.25 MG-MCG TABS per tablet Take by mouth 2 times daily      cetirizine (ZYRTEC) 10 MG tablet Take 10-20 mg by mouth      cholecalciferol 50 MCG (2000 UT) CAPS 3 daily      DULoxetine (CYMBALTA) 60 MG capsule Take 60 mg by mouth      fluticasone-salmeterol (ADVAIR HFA) 230-21 MCG/ACT inhaler Inhale 1 puff into the lungs.      ketorolac (TORADOL) 10 MG tablet PRN for headaches. Not more than 1 tablet/day or 3 tablets/week or more than 6 times a month. 20 tablet 1    lisdexamfetamine (VYVANSE) 40 MG capsule Take 40 mg by mouth every morning.      rimegepant (NURTEC) 75 MG ODT tablet Place 1 tablet (75 mg) under the tongue every 48 hours 16 tablet 3    rizatriptan (MAXALT) 10 MG tablet Take 1 tablet (10 mg) by mouth at onset of headache for migraine May repeat in 2 hours. 18 tablet 11    traZODone (DESYREL) 100 MG tablet Take 100 mg by mouth      verapamil ER (VERELAN) 180 MG 24 hr capsule TAKE ONE CAPSULE BY MOUTH AT BEDTIME 90 capsule 1     No current facility-administered medications for this visit.  "       PHYSICAL EXAMINATION  VITALS: /89 (BP Location: Left arm, Patient Position: Sitting)   Pulse 109   Ht 1.651 m (5' 5\")   Wt 81.6 kg (180 lb)   BMI 29.95 kg/m    GENERAL: Healthy appearing, alert, no acute distress, normal habitus.  CARDIOVASCULAR: Extremities warm and well perfused. Pulses present.   NEUROLOGICAL:  Patient is awake and grossly oriented to self, place and time.  Attention span is normal.  Memory is grossly intact.  Language is fluent and follows commands appropriately.  Appropriate fund of knowledge. Cranial nerves 2-12 are intact. There is no pronator drift.  Motor exam shows 5/5 strength in all extremities.  Tone is symmetric bilaterally in upper and lower extremities.  (Previously reflexes are symmetric and 2+ in upper extremities and lower extremities. Sensory exam is grossly intact to light touch, pin prick and vibration.) Finger to nose and heel to shin is without dysmetria.  Romberg is negative.  Gait is normal and the patient is able to do tandem walk and walk on toes and heels.  No change.    DIAGNOSTICS  RELEVANT LABS  Recent BMP shows elevated creatinine of 1.21.  TSH normal in the past.    OUTSIDE RECORDS  Notes from Dr. Guerra reviewed. Pertinent information is noted in the HPI.     MRI brain-images reviewed with patient.  She does have low-lying cerebellar tonsils though these are very minimal.  No major structural lesions noted.  IMPRESSION:  1.  No evidence of an acute intracranial abnormality.  2.  Low-lying cerebellar tonsils as above.    IMPRESSION/REPORT/PLAN  Intractable chronic migraine without aura and without status migrainosus  Obesity  Concurrent use of multiple antidepressants  Low-lying cerebellar tonsils    This is a 49 year old female with headache suggestive of chronic migraines that are refractory to multiple medications.  MRI brain was negative for structural lesions.  Does show low-lying cerebellar tonsils though most likely these are asymptomatic. " "    Prevention of headaches she has tried Topamax, propranolol, Ajovy, Nurtec without significant benefit.  Currently is on Botox, trigger points and verapamil which have been helpful.  She cannot use other antidepressants as she is already on antidepressants.  Could consider trial of Depakote there is concerns for weight gain.  Will trial a bit higher dose of trigger points in the trapezius as that has been helpful and will see if that helps prevent the multiday headaches.  Previously 200 units of Botox was approved but now insurance is only approving the 155 which might have made the headaches worse.    For abortive therapy she has tried Imitrex, Zomig, Florinol which have not been helpful.  Combination of Maxalt and Nurtec is helpful and will continue.  Cannot try ibuprofen.  Uses Tylenol for less severe headaches.  Will add Toradol as a rescue medication.  Could consider steroids.     Encourage her to keep a log of her headaches.  Return back in 3 months for the next trigger point injections.  1 month for Botox.    -    Ibuprofen 400 mg initially and then Maxalt in 1 hour.  -     Botulinum Toxin Type A (BOTOX) 200 units injection 200 Units  -     verapamil ER (VERELAN) 180 MG 24 hr capsule; Take 1 capsule (180 mg) by mouth at bedtime  -     rizatriptan (MAXALT) 10 MG tablet; Take 1 tablet (10 mg) by mouth at onset of headache for migraine May repeat in 2 hours.  -     rimegepant (NURTEC) 75 MG ODT tablet; Take 1 tablet (75 mg) by mouth every 48 hours  Trigger point injections    -     ketorolac (TORADOL) 10 MG tablet; PRN for headaches. Not more than 1 tablet/day or 3 tablets/week or more than 6 times a month.  -     INJECTION SNGL/MULT TRIGGER POINT, >3 MUSCLES  -     bupivacaine (MARCAINE) 0.25% preservative free injection       Return in about 3 months (around 4/6/2025) for In-Clinic Visit (must), Trigger point injections.     Botox PA  \" The patient has a diagnosis of chronic migraines. She has more than 15 " "headache days a month with each headache lasting at least 4 hours despite use of triptans. Her headaches have been there for over 6 months. She has been screened for medication overuse headaches and she does not have that. She has tried Topamax, Propranolol for 3 months without any significant benefit. Antidepressants cannot be added since she is on multiple antidepressants already. She has tried Ajovy with minimal success. I would request that the Botox be approved for her.    She has had about 50% improvement with 155 units of Botox.  I will request a higher dose of Botox to be approved to see if she can get even further benefit.  70-80% improvement with the higher dose of Botox.  \"    Over 30 minutes were spent coordinating the care for the patient on the day of the encounter.  This includes previsit, during visit and post visit activities as documented above.  Counseling patient.  Prescription management.  Refractory problem.  (Activities include but not inclusive of reviewing chart, reviewing outside records, reviewing labs and imaging study results as well as the images, patient visit time including getting history and exam,  use if applicable, review of test results with the patient and coming up with a plan in a shared model, counseling patient and family, education and answering patient questions, EMR , EMR diagnosis entry and problem list management, medication reconciliation and prescription management if applicable, paperwork if applicable, printing documents and documentation of the visit activities.)        Edi Rojo MD  Neurologist  [facility]  Tel:- [phone]    This note was dictated using voice recognition software.  Any grammatical or context distortions are unintentional and inherent to the software.    "

## 2025-02-20 ENCOUNTER — OFFICE VISIT (OUTPATIENT)
Dept: NEUROLOGY | Facility: CLINIC | Age: 50
End: 2025-02-20
Payer: COMMERCIAL

## 2025-02-20 VITALS
WEIGHT: 175 LBS | BODY MASS INDEX: 29.16 KG/M2 | HEIGHT: 65 IN | HEART RATE: 85 BPM | SYSTOLIC BLOOD PRESSURE: 98 MMHG | DIASTOLIC BLOOD PRESSURE: 60 MMHG

## 2025-02-20 DIAGNOSIS — G43.719 INTRACTABLE CHRONIC MIGRAINE WITHOUT AURA AND WITHOUT STATUS MIGRAINOSUS: Primary | ICD-10-CM

## 2025-02-20 RX ORDER — LISDEXAMFETAMINE DIMESYLATE 30 MG/1
30 CAPSULE ORAL EVERY MORNING
COMMUNITY
Start: 2025-01-08

## 2025-02-20 RX ORDER — TRAZODONE HYDROCHLORIDE 50 MG/1
50 TABLET ORAL AT BEDTIME
COMMUNITY
Start: 2024-12-23

## 2025-02-20 RX ORDER — ATORVASTATIN CALCIUM 40 MG/1
40 TABLET, FILM COATED ORAL DAILY
COMMUNITY
Start: 2024-12-27

## 2025-02-20 NOTE — PROGRESS NOTES
NEUROLOGY OUTPATIENT PROGRESS NOTE   Feb 20, 2025     CHIEF COMPLAINT/REASON FOR VISIT/REASON FOR CONSULT  Patient presents with:  Botox: Botox. She would like to discuss Verapamil - she noticed no changes while taking it.     REASON FOR CONSULTATION- Headaches    REFERRAL SOURCE  Dr. Guerra  CC Dr. Guerra    HISTORY OF PRESENT ILLNESS  Efra Ames is a 49 year old female seen today for evaluation of headaches. She reports that she has been having headaches since age 12. Previously was seen by Dr. Cuadra. Headaches have been 20 days a month. She was put on Ajovy by Dr. Guerra which reduce the headaches to 10 days a month but currently she is switching insurances and this has been stopped.    Headaches are generally bitemporal. They can also be in the frontal region. Sometimes it is in the middle of the head. Headaches are more of a pressure and occasional throbbing. There is occasional visual auras. She reports associated photophobia phonophobia and nausea. Occasionally will get dizziness and even fall down with the headaches. Denies any clear triggers. Does have mental illness which can affect the headaches. She will get difficulty with thinking when she has a bad headache.    Patient is tried Topamax which she did not tolerate has also been on propanolol which she did not tolerate. Has not tried nortriptyline or amitriptyline. Has been on multiple antidepressants for her depression and anxiety. Currently does not want to add more antidepressants due to weight gain issues and being on multiple antidepressants. For abortive therapy she has been on Zomig which did not work. Has also tried Imitrex which did not work. Has tried over-the-counter Tylenol and ibuprofen without benefit.    10/25/21  Patient returns today.  She reports that she continues to have 25 headache days a month.  She is interested in doing the Botox today but is also interested in trying the Ajovy 1 more time.  She is comfortable with the needles but feels  that the Parminder was helping and works in a different mechanism.  Ajovy was stopped last year because the insurance would not cover it anymore.  She reports that the headaches had improved with Ajovy but not completely.  Had questions about the Maxalt.  Headaches have been lasting multiple days.  Headache does become more tolerable after she takes Maxalt for day 1 and wonders if she can take it on day 2 or not.  Discussed with her about taking the Maxalt as soon as she can and repeating it in 2 hours.  She can take the Maxalt the next day the discussed that it might not be as effective as it was on day 1.  She reports no other new issues.    12/27/21  Patient returns today.  She previously was having 25 headache days a month but now is having 16-18 headache days a month.  Feels that the Botox has reduced the edge and headaches are less severe.  Continues to have headaches on the right side.  Occasionally will get the visual auras.    Is on Maxalt but feels that it occasionally will not work.  Is not using anything with the Maxalt.  Does not feel she has enough medication for the whole month.  Is interested in trying new medications.  Given that she gets limited supply of Maxalt she cannot predict which headaches are going to be severe enough that she wants to use the Maxalt or not.  Does daily using the Maxalt.    We discussed the Ajovy and it was decided that we will hold off on that for right now since she is trying the Botox and trying to medications at the same time might not be very beneficial.    9/29/22  To distribute Botox was done 2 months ago.  She has had 4 sessions of Botox.  Reports that the headaches are 3-4 times a week but she is not having 6 to 7 days of continuous headaches.  Headaches can last the full day.  She generally uses Nurtec, ibuprofen or Maxalt in various combinations depending on the severity of her headaches.  Is interested in trying other medications to see if the headaches can be under  better control.  Botox with each session is making things better.  Denies any side effects to the Botox.  No other new symptoms.    4/3/23  Patient returns today.  She has had about 6 sessions of Botox.  The higher dose of Botox has been more effective.  She is having 5-6 bad headaches in the month.  Does have minor headaches couple of times a week.  Is using ibuprofen for those.  Nurtec works the best for abortive therapy.  Relpax has not been so effective.  Maxalt has been more effective.  Wants to continue with the Maxalt.  No other new symptoms.  Headaches are much less severe compared to before with the Botox though continues to be in the same frequency.  Wants to try the Botox for few more sessions.    10/31/23  Patient returns today.  With the last session of Botox she had headaches every day but these are much less severe compared to before.  More tolerable.  Nurtec is also helping her preventive medication.  Maxalt does work as abortive therapy.  Wants to continue the current plan.  Discussed about adding other preventive medications and willing to try verapamil.  Topamax and propanolol have been used in the past.  No other new concerns.  No other provoking factors other than the weather.    3/26/24  Patient returns today  1.  Feels that the headaches are slowly getting better with the Botox but continues to have the headache still quite frequently.  Maxalt does work as abortive therapy.  She has found benefit with the verapamil as well.  No side effects.  Combination with Nurtec has been beneficial.  Headaches are overall slowly getting better with the treatments.  2.  Has not found any new triggers for headaches.  3.  Is interested in trying trigger point injections today.  No other further concerns.    6/14/24  Patient returns today  1.  Trigger point injections have been helpful.  She wants to continue it.  Wants injections done in the longissimus muscle  2.  Botox has been helping significantly with  the headaches.  The headaches are still very frequent but less severe.  3.  Remains on the verapamil.  Is finding some benefit though cannot say for sure  4.  Nurtec as a preventative is not very helpful though as an abortive medication is very helpful.  Has to use it more than 8 times a month.  Additional Maxalt as needed for more severe headaches  No new medications    10/7/24  Patient returns for  1.  Headaches are still 15-18 headache days a month though they are less severe compared to before.  They are not lasting multiple days.  He is using Maxalt/Nurtec for abortive therapy  2.  Uses verapamil for prevention of headaches in addition to the Botox and trigger point injections.  This finding the combination useful.  Trigger point injections are more helpful than the Botox though wants to continue the Botox as well.  No other new concerns.    1/6/24  Patient returns today  1.  Headaches are about 3 times a week.  They are much less severe compared to before she has had 2 episodes of really severe headaches lasting for 2 weeks.  Her Maxalt and Nurtec did not work this time.  Verapamil has been helpful.  Botox and trigger point are also helping.  Feels that the headaches are more in the frontal region.  Trigger points prevent the headaches in the neck region.  She wants to try a higher dose in the trapezius today.  No other new concerns.    2/20/25  Patient returns today  1.  Headaches are about the same compared to before.  She stopped the verapamil as she did not find a lot of benefit and was feeling lightheaded.  Stopping the verapamil has not caused any problems.  Headaches have been more severe in the last week with the Botox wearing off.  She was put on prednisone as she had called the clinic and that has significantly helped.  The headache behind the eye is also better.  She has 1 more day of prednisone left.  Discussed limitations and using prednisone on a regular basis.  Botox 200 units is improved and we  will try that higher dose today and see how she does.  No other new concerns.    Previous history is reviewed and this is unchanged.    PAST MEDICAL/SURGICAL HISTORY  No past medical history on file.  Patient Active Problem List   Diagnosis    Allergic rhinitis    Anisometropia    Anxiety state    Carpal tunnel syndrome    Classical migraine with intractable migraine    Gluten intolerance    History of strabismus surgery    Hypotropia of left eye    Insomnia    Migraine headache    Mild persistent asthma    Moderate episode of recurrent major depressive disorder (H)    Pre-diabetes    Morbid obesity (H)   Significant depression, anxiety, prediabetes, gluten intolerance, high cholesterol    FAMILY HISTORY  Family History   Problem Relation Age of Onset    Migraines Mother    Positive for mother and grandmother with migraines. Daughter with migraines    SOCIAL HISTORY  Social History     Tobacco Use    Smoking status: Never    Smokeless tobacco: Never   Substance Use Topics    Alcohol use: Not Currently       SYSTEMS REVIEW  Twelve-system ROS was done and other than the HPI this was negative except for numbness and tingling, balance coordination problems, dizziness, memory loss, anxiety, depression, respiratory problems/asthma.  No new symptoms/issues.    MEDICATIONS  Current Outpatient Medications   Medication Sig Dispense Refill    albuterol (PROAIR HFA/PROVENTIL HFA/VENTOLIN HFA) 108 (90 Base) MCG/ACT inhaler Inhale 2 puffs into the lungs      atorvastatin (LIPITOR) 40 MG tablet Take 40 mg by mouth daily.      calcium citrate-vitamin D (CITRACAL) 315-6.25 MG-MCG TABS per tablet Take by mouth 2 times daily      cetirizine (ZYRTEC) 10 MG tablet Take 10-20 mg by mouth      cholecalciferol 50 MCG (2000 UT) CAPS 3 daily      ketorolac (TORADOL) 10 MG tablet PRN for headaches. Not more than 1 tablet/day or 3 tablets/week or more than 6 times a month. 20 tablet 1    lisdexamfetamine (VYVANSE) 30 MG capsule Take 30 mg by  "mouth every morning.      predniSONE (DELTASONE) 20 MG tablet Take 3 tablets (60 mg) by mouth daily for 6 days. Take in AM with food. 18 tablet 0    rimegepant (NURTEC) 75 MG ODT tablet Place 1 tablet (75 mg) under the tongue every 48 hours 16 tablet 3    rizatriptan (MAXALT) 10 MG tablet Take 1 tablet (10 mg) by mouth at onset of headache for migraine May repeat in 2 hours. 18 tablet 11    traZODone (DESYREL) 100 MG tablet Take 100 mg by mouth      traZODone (DESYREL) 50 MG tablet Take 50 mg by mouth at bedtime.      AUVELITY  MG TBCR Take  mg by mouth 2 times daily (Patient not taking: Reported on 2/20/2025)      DULoxetine (CYMBALTA) 60 MG capsule Take 60 mg by mouth (Patient not taking: Reported on 2/20/2025)      fluticasone-salmeterol (ADVAIR HFA) 230-21 MCG/ACT inhaler Inhale 1 puff into the lungs. (Patient not taking: Reported on 2/20/2025)       No current facility-administered medications for this visit.        PHYSICAL EXAMINATION  VITALS: BP 98/60 (BP Location: Right arm, Patient Position: Sitting)   Pulse 85   Ht 1.651 m (5' 5\")   Wt 79.4 kg (175 lb)   BMI 29.12 kg/m    GENERAL: Healthy appearing, alert, no acute distress, normal habitus.  CARDIOVASCULAR: Extremities warm and well perfused. Pulses present.   NEUROLOGICAL:  Patient is awake and grossly oriented to self, place and time.  Attention span is normal.  Memory is grossly intact.  Language is fluent and follows commands appropriately.  Appropriate fund of knowledge. Cranial nerves 2-12 are intact. There is no pronator drift.  Motor exam shows 5/5 strength in all extremities.  Tone is symmetric bilaterally in upper and lower extremities.  (Previously reflexes are symmetric and 2+ in upper extremities and lower extremities. Sensory exam is grossly intact to light touch, pin prick and vibration.) Finger to nose and heel to shin is without dysmetria.  Romberg is negative.  Gait is normal and the patient is able to do tandem walk and " walk on toes and heels.  Stable.    DIAGNOSTICS  RELEVANT LABS  Recent BMP shows elevated creatinine of 1.21.  TSH normal in the past.    OUTSIDE RECORDS  Notes from Dr. Guerra reviewed. Pertinent information is noted in the HPI.     MRI brain-images reviewed with patient.  She does have low-lying cerebellar tonsils though these are very minimal.  No major structural lesions noted.  IMPRESSION:  1.  No evidence of an acute intracranial abnormality.  2.  Low-lying cerebellar tonsils as above.    IMPRESSION/REPORT/PLAN  Intractable chronic migraine without aura and without status migrainosus  Obesity  Concurrent use of multiple antidepressants  Low-lying cerebellar tonsils  Worsening headaches because of Botox    This is a 49 year old female with headache suggestive of chronic migraines that are refractory to multiple medications.  MRI brain was negative for structural lesions.  Does show low-lying cerebellar tonsils though most likely these are asymptomatic.     Prevention of headaches she has tried Topamax, propranolol, Ajovy, Nurtec without significant benefit.  Currently is on Botox, trigger points which are helpful.  Verapamil did not provide any significant benefit and she does complain of lightheadedness and this has been stopped.  She cannot use other antidepressants as she is already on antidepressants.  Could consider trial of Depakote there is concerns for weight gain.  Will trial a bit higher dose of trigger points in the trapezius as that has been helpful and will see if that helps prevent the multiday headaches.  Previously 200 units of Botox was approved but now insurance is only approving the 155 which might have made the headaches worse.  Insurance is not approving the 200 units again and we will see if that works better for her.    For abortive therapy she has tried Imitrex, Zomig, Florinol which have not been helpful.  Combination of Maxalt and Nurtec is helpful and will continue.  Cannot try ibuprofen.  " Uses Tylenol for less severe headaches.  Will add Toradol as a rescue medication.  Could consider steroids.  Steroids have been helpful though because of her gastric sleeve will use these very sparingly.     Encourage her to keep a log of her headaches.  Alternate between trigger points and Botox.    -    Ibuprofen 400 mg initially and then Maxalt in 1 hour.  -     Botulinum Toxin Type A (BOTOX) 200 units injection 200 Units  (STOP)  -     rizatriptan (MAXALT) 10 MG tablet; Take 1 tablet (10 mg) by mouth at onset of headache for migraine May repeat in 2 hours.  -     rimegepant (NURTEC) 75 MG ODT tablet; Take 1 tablet (75 mg) by mouth every 48 hours  Trigger point injections    -     ketorolac (TORADOL) 10 MG tablet; PRN for headaches. Not more than 1 tablet/day or 3 tablets/week or more than 6 times a month.  -Prednisone for status migrainosus.     Return in about 3 months (around 5/20/2025) for In-Clinic Visit (must), Botox.     Botox PA  \" The patient has a diagnosis of chronic migraines. She has more than 15 headache days a month with each headache lasting at least 4 hours despite use of triptans. Her headaches have been there for over 6 months. She has been screened for medication overuse headaches and she does not have that. She has tried Topamax, Propranolol for 3 months without any significant benefit. Antidepressants cannot be added since she is on multiple antidepressants already. She has tried Ajovy with minimal success. I would request that the Botox be approved for her.    She has had about 50% improvement with 155 units of Botox.  I will request a higher dose of Botox to be approved to see if she can get even further benefit.  70-80% improvement with the higher dose of Botox.  \"    Over 30 minutes were spent coordinating the care for the patient on the day of the encounter.  This includes previsit, during visit and post visit activities as documented above.  Counseling patient.  Prescription " management.  Refractory problem.  Trying to confirm what dose of Botox was approved for the procedure.  (Activities include but not inclusive of reviewing chart, reviewing outside records, reviewing labs and imaging study results as well as the images, patient visit time including getting history and exam,  use if applicable, review of test results with the patient and coming up with a plan in a shared model, counseling patient and family, education and answering patient questions, EMR , EMR diagnosis entry and problem list management, medication reconciliation and prescription management if applicable, paperwork if applicable, printing documents and documentation of the visit activities.)        Edi Rojo MD  Neurologist  [facility]  Tel:- [phone]    This note was dictated using voice recognition software.  Any grammatical or context distortions are unintentional and inherent to the software.

## 2025-02-20 NOTE — PROCEDURES
NEUROLOGY PROCEDURE NOTE  Feb 20, 2025      Chronic migraine Botox injection    CONSENT: The procedure was explained to the patient. The risks and benefits of the procedure and the alternatives were discussed with the patient. The patient was given an opportunity to ask any questions she had about the procedure. A verbal consent was obtained from the patient. A written consent is available in the chart.    PRE-PROCEDURE  Diagnosis: Chronic migraine  Headache frequency before the use of Botox:- 25 headache days per month  Headache frequency with Botox use:- 70-80% improvement in first 2 months, some wearing off in last 3 weeks.     EXAM  GENERAL: Healthy appearing, alert, no acute distress, normal habitus.  NEUROLOGICAL:  Patient is alert with fluent language.  Extraocular movements are intact.  Facial movements are present and symmetric.  No arm drift.    PROCEDURE SUMMARY:   The following muscles were identified after palpating for landmarks and the overlying skin was cleansed with alcohol. These muscles were then injected using a 30 guage- half  inch needle with the following doses of onabotulinumtoxin A. A 5 unit/ 0.1 ml dilution was used for the injections. A 2 x 100 unit vial was used.    Corrugators 5 units each side.  Procerus 5 units.  Frontalis 10 units each side.  Temporalis 25 units each side.  Occipitalis 15 units each side.  Paraspinals 10 units each side.  Trapezius 30 units each side.    Units Injected: 195 Units  Units Discarded: 5 Units  Lot Number and Expiration: [id number]; 5/2027    The patient tolerated the procedure without any immediate complaints and will call the Neurology clinic if she has any problems or side effects from the medication. The patient will follow up in the Neurology clinic as previously decided.      Edi Rojo MD  Neurologist  [facility]  [phone]

## 2025-02-20 NOTE — NURSING NOTE
Chief Complaint   Patient presents with    Botox     Botox. She would like to discuss Verapamil - she noticed no changes while taking it.      Margaret Luna MA

## 2025-02-20 NOTE — LETTER
2/20/2025      Efra Ames  [address] [facility] [address]      Dear Colleague,    Thank you for referring your patient, Efra Ames, to the [facility] [place]. Please see a copy of my visit note below.    NEUROLOGY OUTPATIENT PROGRESS NOTE   Feb 20, 2025     CHIEF COMPLAINT/REASON FOR VISIT/REASON FOR CONSULT  Patient presents with:  Botox: Botox. She would like to discuss Verapamil - she noticed no changes while taking it.     REASON FOR CONSULTATION- Headaches    REFERRAL SOURCE  Dr. Guerra  CC Dr. Guerra    HISTORY OF PRESENT ILLNESS  Efra Ames is a 49 year old female seen today for evaluation of headaches. She reports that she has been having headaches since age 12. Previously was seen by Dr. Cuadra. Headaches have been 20 days a month. She was put on Ajovy by Dr. Guerra which reduce the headaches to 10 days a month but currently she is switching insurances and this has been stopped.    Headaches are generally bitemporal. They can also be in the frontal region. Sometimes it is in the middle of the head. Headaches are more of a pressure and occasional throbbing. There is occasional visual auras. She reports associated photophobia phonophobia and nausea. Occasionally will get dizziness and even fall down with the headaches. Denies any clear triggers. Does have mental illness which can affect the headaches. She will get difficulty with thinking when she has a bad headache.    Patient is tried Topamax which she did not tolerate has also been on propanolol which she did not tolerate. Has not tried nortriptyline or amitriptyline. Has been on multiple antidepressants for her depression and anxiety. Currently does not want to add more antidepressants due to weight gain issues and being on multiple antidepressants. For abortive therapy she has been on Zomig which did not work. Has also tried Imitrex which did not work. Has tried over-the-counter Tylenol and ibuprofen without  benefit.    10/25/21  Patient returns today.  She reports that she continues to have 25 headache days a month.  She is interested in doing the Botox today but is also interested in trying the Ajovy 1 more time.  She is comfortable with the needles but feels that the Parminder was helping and works in a different mechanism.  Ajovy was stopped last year because the insurance would not cover it anymore.  She reports that the headaches had improved with Ajovy but not completely.  Had questions about the Maxalt.  Headaches have been lasting multiple days.  Headache does become more tolerable after she takes Maxalt for day 1 and wonders if she can take it on day 2 or not.  Discussed with her about taking the Maxalt as soon as she can and repeating it in 2 hours.  She can take the Maxalt the next day the discussed that it might not be as effective as it was on day 1.  She reports no other new issues.    12/27/21  Patient returns today.  She previously was having 25 headache days a month but now is having 16-18 headache days a month.  Feels that the Botox has reduced the edge and headaches are less severe.  Continues to have headaches on the right side.  Occasionally will get the visual auras.    Is on Maxalt but feels that it occasionally will not work.  Is not using anything with the Maxalt.  Does not feel she has enough medication for the whole month.  Is interested in trying new medications.  Given that she gets limited supply of Maxalt she cannot predict which headaches are going to be severe enough that she wants to use the Maxalt or not.  Does daily using the Maxalt.    We discussed the Ajovy and it was decided that we will hold off on that for right now since she is trying the Botox and trying to medications at the same time might not be very beneficial.    9/29/22  To distribute Botox was done 2 months ago.  She has had 4 sessions of Botox.  Reports that the headaches are 3-4 times a week but she is not having 6 to 7  days of continuous headaches.  Headaches can last the full day.  She generally uses Nurtec, ibuprofen or Maxalt in various combinations depending on the severity of her headaches.  Is interested in trying other medications to see if the headaches can be under better control.  Botox with each session is making things better.  Denies any side effects to the Botox.  No other new symptoms.    4/3/23  Patient returns today.  She has had about 6 sessions of Botox.  The higher dose of Botox has been more effective.  She is having 5-6 bad headaches in the month.  Does have minor headaches couple of times a week.  Is using ibuprofen for those.  Nurtec works the best for abortive therapy.  Relpax has not been so effective.  Maxalt has been more effective.  Wants to continue with the Maxalt.  No other new symptoms.  Headaches are much less severe compared to before with the Botox though continues to be in the same frequency.  Wants to try the Botox for few more sessions.    10/31/23  Patient returns today.  With the last session of Botox she had headaches every day but these are much less severe compared to before.  More tolerable.  Nurtec is also helping her preventive medication.  Maxalt does work as abortive therapy.  Wants to continue the current plan.  Discussed about adding other preventive medications and willing to try verapamil.  Topamax and propanolol have been used in the past.  No other new concerns.  No other provoking factors other than the weather.    3/26/24  Patient returns today  1.  Feels that the headaches are slowly getting better with the Botox but continues to have the headache still quite frequently.  Maxalt does work as abortive therapy.  She has found benefit with the verapamil as well.  No side effects.  Combination with Nurtec has been beneficial.  Headaches are overall slowly getting better with the treatments.  2.  Has not found any new triggers for headaches.  3.  Is interested in trying trigger  point injections today.  No other further concerns.    6/14/24  Patient returns today  1.  Trigger point injections have been helpful.  She wants to continue it.  Wants injections done in the longissimus muscle  2.  Botox has been helping significantly with the headaches.  The headaches are still very frequent but less severe.  3.  Remains on the verapamil.  Is finding some benefit though cannot say for sure  4.  Nurtec as a preventative is not very helpful though as an abortive medication is very helpful.  Has to use it more than 8 times a month.  Additional Maxalt as needed for more severe headaches  No new medications    10/7/24  Patient returns for  1.  Headaches are still 15-18 headache days a month though they are less severe compared to before.  They are not lasting multiple days.  He is using Maxalt/Nurtec for abortive therapy  2.  Uses verapamil for prevention of headaches in addition to the Botox and trigger point injections.  This finding the combination useful.  Trigger point injections are more helpful than the Botox though wants to continue the Botox as well.  No other new concerns.    1/6/24  Patient returns today  1.  Headaches are about 3 times a week.  They are much less severe compared to before she has had 2 episodes of really severe headaches lasting for 2 weeks.  Her Maxalt and Nurtec did not work this time.  Verapamil has been helpful.  Botox and trigger point are also helping.  Feels that the headaches are more in the frontal region.  Trigger points prevent the headaches in the neck region.  She wants to try a higher dose in the trapezius today.  No other new concerns.    2/20/25  Patient returns today  1.  Headaches are about the same compared to before.  She stopped the verapamil as she did not find a lot of benefit and was feeling lightheaded.  Stopping the verapamil has not caused any problems.  Headaches have been more severe in the last week with the Botox wearing off.  She was put on  prednisone as she had called the clinic and that has significantly helped.  The headache behind the eye is also better.  She has 1 more day of prednisone left.  Discussed limitations and using prednisone on a regular basis.  Botox 200 units is improved and we will try that higher dose today and see how she does.  No other new concerns.    Previous history is reviewed and this is unchanged.    PAST MEDICAL/SURGICAL HISTORY  No past medical history on file.  Patient Active Problem List   Diagnosis     Allergic rhinitis     Anisometropia     Anxiety state     Carpal tunnel syndrome     Classical migraine with intractable migraine     Gluten intolerance     History of strabismus surgery     Hypotropia of left eye     Insomnia     Migraine headache     Mild persistent asthma     Moderate episode of recurrent major depressive disorder (H)     Pre-diabetes     Morbid obesity (H)   Significant depression, anxiety, prediabetes, gluten intolerance, high cholesterol    FAMILY HISTORY  Family History   Problem Relation Age of Onset     Migraines Mother    Positive for mother and grandmother with migraines. Daughter with migraines    SOCIAL HISTORY  Social History     Tobacco Use     Smoking status: Never     Smokeless tobacco: Never   Substance Use Topics     Alcohol use: Not Currently       SYSTEMS REVIEW  Twelve-system ROS was done and other than the HPI this was negative except for numbness and tingling, balance coordination problems, dizziness, memory loss, anxiety, depression, respiratory problems/asthma.  No new symptoms/issues.    MEDICATIONS  Current Outpatient Medications   Medication Sig Dispense Refill     albuterol (PROAIR HFA/PROVENTIL HFA/VENTOLIN HFA) 108 (90 Base) MCG/ACT inhaler Inhale 2 puffs into the lungs       atorvastatin (LIPITOR) 40 MG tablet Take 40 mg by mouth daily.       calcium citrate-vitamin D (CITRACAL) 315-6.25 MG-MCG TABS per tablet Take by mouth 2 times daily       cetirizine (ZYRTEC) 10 MG  "tablet Take 10-20 mg by mouth       cholecalciferol 50 MCG (2000 UT) CAPS 3 daily       ketorolac (TORADOL) 10 MG tablet PRN for headaches. Not more than 1 tablet/day or 3 tablets/week or more than 6 times a month. 20 tablet 1     lisdexamfetamine (VYVANSE) 30 MG capsule Take 30 mg by mouth every morning.       predniSONE (DELTASONE) 20 MG tablet Take 3 tablets (60 mg) by mouth daily for 6 days. Take in AM with food. 18 tablet 0     rimegepant (NURTEC) 75 MG ODT tablet Place 1 tablet (75 mg) under the tongue every 48 hours 16 tablet 3     rizatriptan (MAXALT) 10 MG tablet Take 1 tablet (10 mg) by mouth at onset of headache for migraine May repeat in 2 hours. 18 tablet 11     traZODone (DESYREL) 100 MG tablet Take 100 mg by mouth       traZODone (DESYREL) 50 MG tablet Take 50 mg by mouth at bedtime.       AUVELITY  MG TBCR Take  mg by mouth 2 times daily (Patient not taking: Reported on 2/20/2025)       DULoxetine (CYMBALTA) 60 MG capsule Take 60 mg by mouth (Patient not taking: Reported on 2/20/2025)       fluticasone-salmeterol (ADVAIR HFA) 230-21 MCG/ACT inhaler Inhale 1 puff into the lungs. (Patient not taking: Reported on 2/20/2025)       No current facility-administered medications for this visit.        PHYSICAL EXAMINATION  VITALS: BP 98/60 (BP Location: Right arm, Patient Position: Sitting)   Pulse 85   Ht 1.651 m (5' 5\")   Wt 79.4 kg (175 lb)   BMI 29.12 kg/m    GENERAL: Healthy appearing, alert, no acute distress, normal habitus.  CARDIOVASCULAR: Extremities warm and well perfused. Pulses present.   NEUROLOGICAL:  Patient is awake and grossly oriented to self, place and time.  Attention span is normal.  Memory is grossly intact.  Language is fluent and follows commands appropriately.  Appropriate fund of knowledge. Cranial nerves 2-12 are intact. There is no pronator drift.  Motor exam shows 5/5 strength in all extremities.  Tone is symmetric bilaterally in upper and lower extremities.  " (Previously reflexes are symmetric and 2+ in upper extremities and lower extremities. Sensory exam is grossly intact to light touch, pin prick and vibration.) Finger to nose and heel to shin is without dysmetria.  Romberg is negative.  Gait is normal and the patient is able to do tandem walk and walk on toes and heels.  Stable.    DIAGNOSTICS  RELEVANT LABS  Recent BMP shows elevated creatinine of 1.21.  TSH normal in the past.    OUTSIDE RECORDS  Notes from Dr. Guerra reviewed. Pertinent information is noted in the HPI.     MRI brain-images reviewed with patient.  She does have low-lying cerebellar tonsils though these are very minimal.  No major structural lesions noted.  IMPRESSION:  1.  No evidence of an acute intracranial abnormality.  2.  Low-lying cerebellar tonsils as above.    IMPRESSION/REPORT/PLAN  Intractable chronic migraine without aura and without status migrainosus  Obesity  Concurrent use of multiple antidepressants  Low-lying cerebellar tonsils  Worsening headaches because of Botox    This is a 49 year old female with headache suggestive of chronic migraines that are refractory to multiple medications.  MRI brain was negative for structural lesions.  Does show low-lying cerebellar tonsils though most likely these are asymptomatic.     Prevention of headaches she has tried Topamax, propranolol, Ajovy, Nurtec without significant benefit.  Currently is on Botox, trigger points which are helpful.  Verapamil did not provide any significant benefit and she does complain of lightheadedness and this has been stopped.  She cannot use other antidepressants as she is already on antidepressants.  Could consider trial of Depakote there is concerns for weight gain.  Will trial a bit higher dose of trigger points in the trapezius as that has been helpful and will see if that helps prevent the multiday headaches.  Previously 200 units of Botox was approved but now insurance is only approving the 155 which might have  "made the headaches worse.  Insurance is not approving the 200 units again and we will see if that works better for her.    For abortive therapy she has tried Imitrex, Zomig, Florinol which have not been helpful.  Combination of Maxalt and Nurtec is helpful and will continue.  Cannot try ibuprofen.  Uses Tylenol for less severe headaches.  Will add Toradol as a rescue medication.  Could consider steroids.  Steroids have been helpful though because of her gastric sleeve will use these very sparingly.     Encourage her to keep a log of her headaches.  Alternate between trigger points and Botox.    -    Ibuprofen 400 mg initially and then Maxalt in 1 hour.  -     Botulinum Toxin Type A (BOTOX) 200 units injection 200 Units  (STOP)  -     rizatriptan (MAXALT) 10 MG tablet; Take 1 tablet (10 mg) by mouth at onset of headache for migraine May repeat in 2 hours.  -     rimegepant (NURTEC) 75 MG ODT tablet; Take 1 tablet (75 mg) by mouth every 48 hours  Trigger point injections    -     ketorolac (TORADOL) 10 MG tablet; PRN for headaches. Not more than 1 tablet/day or 3 tablets/week or more than 6 times a month.  -Prednisone for status migrainosus.     Return in about 3 months (around 5/20/2025) for In-Clinic Visit (must), Botox.     Botox PA  \" The patient has a diagnosis of chronic migraines. She has more than 15 headache days a month with each headache lasting at least 4 hours despite use of triptans. Her headaches have been there for over 6 months. She has been screened for medication overuse headaches and she does not have that. She has tried Topamax, Propranolol for 3 months without any significant benefit. Antidepressants cannot be added since she is on multiple antidepressants already. She has tried Ajovy with minimal success. I would request that the Botox be approved for her.    She has had about 50% improvement with 155 units of Botox.  I will request a higher dose of Botox to be approved to see if she can get " "even further benefit.  70-80% improvement with the higher dose of Botox.  \"    Over 30 minutes were spent coordinating the care for the patient on the day of the encounter.  This includes previsit, during visit and post visit activities as documented above.  Counseling patient.  Prescription management.  Refractory problem.  Trying to confirm what dose of Botox was approved for the procedure.  (Activities include but not inclusive of reviewing chart, reviewing outside records, reviewing labs and imaging study results as well as the images, patient visit time including getting history and exam,  use if applicable, review of test results with the patient and coming up with a plan in a shared model, counseling patient and family, education and answering patient questions, EMR , EMR diagnosis entry and problem list management, medication reconciliation and prescription management if applicable, paperwork if applicable, printing documents and documentation of the visit activities.)        Edi Rojo MD  Neurologist  [facility]  Tel:- [phone]    This note was dictated using voice recognition software.  Any grammatical or context distortions are unintentional and inherent to the software.      Again, thank you for allowing me to participate in the care of your patient.        Sincerely,        Edi Rojo MD    Electronically signed" no loss of consciousness, no gait abnormality, no headache, no sensory deficits, and no weakness.

## 2025-03-23 ENCOUNTER — HEALTH MAINTENANCE LETTER (OUTPATIENT)
Age: 50
End: 2025-03-23

## 2025-03-27 DIAGNOSIS — G43.719 INTRACTABLE CHRONIC MIGRAINE WITHOUT AURA AND WITHOUT STATUS MIGRAINOSUS: ICD-10-CM

## 2025-03-28 NOTE — TELEPHONE ENCOUNTER
Pending Prescriptions:                       Disp   Refills    rizatriptan (MAXALT) 10 MG tablet [Pharma*18 tab*5            Sig: Take 1 tablet by mouth at onset of migraine. May           repeat in 2 hours.    See T'd refill below for Rizatriptan, has listed allergy to sumatriptan but has been taking rizatriptan since last year. LOV 2/20/25, NOV 4/7/25    Onur MONTESINOS RN, BSN  [facility] Neurology

## 2025-03-31 RX ORDER — RIZATRIPTAN BENZOATE 10 MG/1
TABLET ORAL
Qty: 18 TABLET | Refills: 5 | Status: SHIPPED | OUTPATIENT
Start: 2025-03-31

## 2025-04-07 ENCOUNTER — OFFICE VISIT (OUTPATIENT)
Dept: NEUROLOGY | Facility: CLINIC | Age: 50
End: 2025-04-07
Payer: COMMERCIAL

## 2025-04-07 VITALS
DIASTOLIC BLOOD PRESSURE: 60 MMHG | SYSTOLIC BLOOD PRESSURE: 99 MMHG | HEART RATE: 83 BPM | WEIGHT: 173.9 LBS | BODY MASS INDEX: 28.94 KG/M2

## 2025-04-07 DIAGNOSIS — M79.18 MYALGIA, OTHER SITE: ICD-10-CM

## 2025-04-07 DIAGNOSIS — G43.719 INTRACTABLE CHRONIC MIGRAINE WITHOUT AURA AND WITHOUT STATUS MIGRAINOSUS: Primary | ICD-10-CM

## 2025-04-07 PROCEDURE — 99214 OFFICE O/P EST MOD 30 MIN: CPT | Mod: 25 | Performed by: PSYCHIATRY & NEUROLOGY

## 2025-04-07 PROCEDURE — 20553 NJX 1/MLT TRIGGER POINTS 3/>: CPT | Performed by: PSYCHIATRY & NEUROLOGY

## 2025-04-07 RX ORDER — BUPIVACAINE HYDROCHLORIDE 2.5 MG/ML
4 INJECTION, SOLUTION EPIDURAL; INFILTRATION; INTRACAUDAL; PERINEURAL ONCE
Status: DISCONTINUED | OUTPATIENT
Start: 2025-04-07 | End: 2025-04-07

## 2025-04-07 RX ORDER — PREDNISONE 20 MG/1
60 TABLET ORAL DAILY
Qty: 18 TABLET | Refills: 0 | Status: SHIPPED | OUTPATIENT
Start: 2025-04-07 | End: 2025-04-13

## 2025-04-07 RX ORDER — BUPIVACAINE HYDROCHLORIDE 2.5 MG/ML
6 INJECTION, SOLUTION EPIDURAL; INFILTRATION; INTRACAUDAL; PERINEURAL ONCE
Status: COMPLETED | OUTPATIENT
Start: 2025-04-07 | End: 2025-04-07

## 2025-04-07 RX ADMIN — BUPIVACAINE HYDROCHLORIDE 15 MG: 2.5 INJECTION, SOLUTION EPIDURAL; INFILTRATION; INTRACAUDAL; PERINEURAL at 11:14

## 2025-04-07 NOTE — PROCEDURES
NEUROLOGY PROCEDURE NOTE  Apr 7, 2025      Trigger Point injection    CONSENT: The procedure was explained to the patient. The risks and benefits of the procedure and the alternatives were discussed with the patient. The patient was given an opportunity to ask any questions they had about the procedure. A verbal consent was obtained from the patient. A written consent is available in the chart.    PRE-PROCEDURE  Diagnosis: Neck pain/Trigger point/Migraine Headaches/Myositis    EXAM  GENERAL: Healthy appearing, alert, no acute distress, normal habitus.  NEUROLOGICAL:  Patient is alert with fluent language.  Extraocular movements are intact.  Facial movements are present and symmetric.  No arm drift.    PROCEDURE SUMMARY:   The following trigger points were identified after palpating for landmarks and the overlying skin was cleansed with alcohol. These muscles were then injected using a 30 guage- half inch needle with the following doses of bupivacaine 0.25% preservative-free.     Left trapezius 1 ml  Left medial trapezius 1 mL  Right trapezius 1 ml  Right medial trapezius 1 mL  Left longissimus 0.5 mL  Right longissimus 0.5 mL  Left cervical trapezius 0.5 mL  Right cervical trapezius 0.5 mL    Total injected 6 mL  Lot Number and Expiration: [id number]; 6/27    The patient tolerated the procedure without any immediate complaints and will call the Neurology clinic if she has any problems or side effects from the medication.  The patient noticed some improvement in the pain right after the injection.  The patient will follow up in the Neurology clinic as previously decided.      Edi Rojo MD  Neurologist  [facility] Neurology [facility]  [phone]

## 2025-04-07 NOTE — NURSING NOTE
"Efra Ames is a 50 year old female who presents for:  Chief Complaint   Patient presents with    Headache     Trigger point         Initial Vitals:  BP 99/60   Pulse 83   Wt 78.9 kg (173 lb 14.4 oz)   BMI 28.94 kg/m   Estimated body mass index is 28.94 kg/m  as calculated from the following:    Height as of 2/20/25: 1.651 m (5' 5\").    Weight as of this encounter: 78.9 kg (173 lb 14.4 oz).. Body surface area is 1.9 meters squared. BP completed using cuff size: wrist cuff    Jelani Velasco  "

## 2025-04-07 NOTE — PROGRESS NOTES
NEUROLOGY OUTPATIENT PROGRESS NOTE   Apr 7, 2025     CHIEF COMPLAINT/REASON FOR VISIT/REASON FOR CONSULT  Patient presents with:  Headache: Trigger point     REASON FOR CONSULTATION- Headaches    REFERRAL SOURCE  Dr. Guerra  CC Dr. Guerra    HISTORY OF PRESENT ILLNESS  Efra Ames is a 50 year old female seen today for evaluation of headaches. She reports that she has been having headaches since age 12. Previously was seen by Dr. Cuadra. Headaches have been 20 days a month. She was put on Ajovy by Dr. Guerra which reduce the headaches to 10 days a month but currently she is switching insurances and this has been stopped.    Headaches are generally bitemporal. They can also be in the frontal region. Sometimes it is in the middle of the head. Headaches are more of a pressure and occasional throbbing. There is occasional visual auras. She reports associated photophobia phonophobia and nausea. Occasionally will get dizziness and even fall down with the headaches. Denies any clear triggers. Does have mental illness which can affect the headaches. She will get difficulty with thinking when she has a bad headache.    Patient is tried Topamax which she did not tolerate has also been on propanolol which she did not tolerate. Has not tried nortriptyline or amitriptyline. Has been on multiple antidepressants for her depression and anxiety. Currently does not want to add more antidepressants due to weight gain issues and being on multiple antidepressants. For abortive therapy she has been on Zomig which did not work. Has also tried Imitrex which did not work. Has tried over-the-counter Tylenol and ibuprofen without benefit.    10/25/21  Patient returns today.  She reports that she continues to have 25 headache days a month.  She is interested in doing the Botox today but is also interested in trying the Ajovy 1 more time.  She is comfortable with the needles but feels that the Parminder was helping and works in a different mechanism.  Ajovy  was stopped last year because the insurance would not cover it anymore.  She reports that the headaches had improved with Ajovy but not completely.  Had questions about the Maxalt.  Headaches have been lasting multiple days.  Headache does become more tolerable after she takes Maxalt for day 1 and wonders if she can take it on day 2 or not.  Discussed with her about taking the Maxalt as soon as she can and repeating it in 2 hours.  She can take the Maxalt the next day the discussed that it might not be as effective as it was on day 1.  She reports no other new issues.    12/27/21  Patient returns today.  She previously was having 25 headache days a month but now is having 16-18 headache days a month.  Feels that the Botox has reduced the edge and headaches are less severe.  Continues to have headaches on the right side.  Occasionally will get the visual auras.    Is on Maxalt but feels that it occasionally will not work.  Is not using anything with the Maxalt.  Does not feel she has enough medication for the whole month.  Is interested in trying new medications.  Given that she gets limited supply of Maxalt she cannot predict which headaches are going to be severe enough that she wants to use the Maxalt or not.  Does daily using the Maxalt.    We discussed the Ajovy and it was decided that we will hold off on that for right now since she is trying the Botox and trying to medications at the same time might not be very beneficial.    9/29/22  To distribute Botox was done 2 months ago.  She has had 4 sessions of Botox.  Reports that the headaches are 3-4 times a week but she is not having 6 to 7 days of continuous headaches.  Headaches can last the full day.  She generally uses Nurtec, ibuprofen or Maxalt in various combinations depending on the severity of her headaches.  Is interested in trying other medications to see if the headaches can be under better control.  Botox with each session is making things better.   Denies any side effects to the Botox.  No other new symptoms.    4/3/23  Patient returns today.  She has had about 6 sessions of Botox.  The higher dose of Botox has been more effective.  She is having 5-6 bad headaches in the month.  Does have minor headaches couple of times a week.  Is using ibuprofen for those.  Nurtec works the best for abortive therapy.  Relpax has not been so effective.  Maxalt has been more effective.  Wants to continue with the Maxalt.  No other new symptoms.  Headaches are much less severe compared to before with the Botox though continues to be in the same frequency.  Wants to try the Botox for few more sessions.    10/31/23  Patient returns today.  With the last session of Botox she had headaches every day but these are much less severe compared to before.  More tolerable.  Nurtec is also helping her preventive medication.  Maxalt does work as abortive therapy.  Wants to continue the current plan.  Discussed about adding other preventive medications and willing to try verapamil.  Topamax and propanolol have been used in the past.  No other new concerns.  No other provoking factors other than the weather.    3/26/24  Patient returns today  1.  Feels that the headaches are slowly getting better with the Botox but continues to have the headache still quite frequently.  Maxalt does work as abortive therapy.  She has found benefit with the verapamil as well.  No side effects.  Combination with Nurtec has been beneficial.  Headaches are overall slowly getting better with the treatments.  2.  Has not found any new triggers for headaches.  3.  Is interested in trying trigger point injections today.  No other further concerns.    6/14/24  Patient returns today  1.  Trigger point injections have been helpful.  She wants to continue it.  Wants injections done in the longissimus muscle  2.  Botox has been helping significantly with the headaches.  The headaches are still very frequent but less  severe.  3.  Remains on the verapamil.  Is finding some benefit though cannot say for sure  4.  Nurtec as a preventative is not very helpful though as an abortive medication is very helpful.  Has to use it more than 8 times a month.  Additional Maxalt as needed for more severe headaches  No new medications    10/7/24  Patient returns for  1.  Headaches are still 15-18 headache days a month though they are less severe compared to before.  They are not lasting multiple days.  He is using Maxalt/Nurtec for abortive therapy  2.  Uses verapamil for prevention of headaches in addition to the Botox and trigger point injections.  This finding the combination useful.  Trigger point injections are more helpful than the Botox though wants to continue the Botox as well.  No other new concerns.    1/6/24  Patient returns today  1.  Headaches are about 3 times a week.  They are much less severe compared to before she has had 2 episodes of really severe headaches lasting for 2 weeks.  Her Maxalt and Nurtec did not work this time.  Verapamil has been helpful.  Botox and trigger point are also helping.  Feels that the headaches are more in the frontal region.  Trigger points prevent the headaches in the neck region.  She wants to try a higher dose in the trapezius today.  No other new concerns.    2/20/25  Patient returns today  1.  Headaches are about the same compared to before.  She stopped the verapamil as she did not find a lot of benefit and was feeling lightheaded.  Stopping the verapamil has not caused any problems.  Headaches have been more severe in the last week with the Botox wearing off.  She was put on prednisone as she had called the clinic and that has significantly helped.  The headache behind the eye is also better.  She has 1 more day of prednisone left.  Discussed limitations and using prednisone on a regular basis.  Botox 200 units is improved and we will try that higher dose today and see how she does.  No  other new concerns.    4/7/25  Patient returns today.  1.  Headaches have not significantly improved since the higher dose of Botox.  She still has 4-5 headaches a week.  Feels that the headaches are less severe and lasting less longer compared to before.  No side effects with the higher dose of Botox.  We discussed changing the dose of the trigger points it was decided to keep her on the same dose for right now and try the Botox 1 more time and then decide.  She is traveling to [place] and will be late on her Botox and is requesting prednisone to cover her for this 2 weeks.  Other medications are helpful no change.  No new concerns.    Previous history is reviewed and this is unchanged.    PAST MEDICAL/SURGICAL HISTORY  No past medical history on file.  Patient Active Problem List   Diagnosis    Allergic rhinitis    Anisometropia    Anxiety state    Carpal tunnel syndrome    Classical migraine with intractable migraine    Gluten intolerance    History of strabismus surgery    Hypotropia of left eye    Insomnia    Migraine headache    Mild persistent asthma    Moderate episode of recurrent major depressive disorder (H)    Pre-diabetes    Morbid obesity (H)   Significant depression, anxiety, prediabetes, gluten intolerance, high cholesterol    FAMILY HISTORY  Family History   Problem Relation Age of Onset    Migraines Mother    Positive for mother and grandmother with migraines. Daughter with migraines    SOCIAL HISTORY  Social History     Tobacco Use    Smoking status: Never    Smokeless tobacco: Never   Substance Use Topics    Alcohol use: Not Currently       SYSTEMS REVIEW  Twelve-system ROS was done and other than the HPI this was negative except for numbness and tingling, balance coordination problems, dizziness, memory loss, anxiety, depression, respiratory problems/asthma.  No new symptoms/issues.    MEDICATIONS  Current Outpatient Medications   Medication Sig Dispense Refill    albuterol (PROAIR  HFA/PROVENTIL HFA/VENTOLIN HFA) 108 (90 Base) MCG/ACT inhaler Inhale 2 puffs into the lungs      atorvastatin (LIPITOR) 40 MG tablet Take 40 mg by mouth daily.      calcium citrate-vitamin D (CITRACAL) 315-6.25 MG-MCG TABS per tablet Take by mouth 2 times daily      cetirizine (ZYRTEC) 10 MG tablet Take 10-20 mg by mouth      cholecalciferol 50 MCG (2000 UT) CAPS 3 daily      ketorolac (TORADOL) 10 MG tablet PRN for headaches. Not more than 1 tablet/day or 3 tablets/week or more than 6 times a month. 20 tablet 1    lisdexamfetamine (VYVANSE) 30 MG capsule Take 30 mg by mouth every morning.      predniSONE (DELTASONE) 20 MG tablet Take 3 tablets (60 mg) by mouth daily for 6 days. Take in AM with food. 18 tablet 0    rimegepant (NURTEC) 75 MG ODT tablet Place 1 tablet (75 mg) under the tongue every 48 hours 16 tablet 3    rizatriptan (MAXALT) 10 MG tablet Take 1 tablet by mouth at onset of migraine. May repeat in 2 hours. 18 tablet 5    traZODone (DESYREL) 100 MG tablet Take 100 mg by mouth      traZODone (DESYREL) 50 MG tablet Take 50 mg by mouth at bedtime.      AUVELITY  MG TBCR Take  mg by mouth 2 times daily (Patient not taking: Reported on 4/7/2025)      DULoxetine (CYMBALTA) 60 MG capsule Take 60 mg by mouth (Patient not taking: Reported on 4/7/2025)      fluticasone-salmeterol (ADVAIR HFA) 230-21 MCG/ACT inhaler Inhale 1 puff into the lungs. (Patient not taking: Reported on 4/7/2025)       No current facility-administered medications for this visit.        PHYSICAL EXAMINATION  VITALS: BP 99/60   Pulse 83   Wt 78.9 kg (173 lb 14.4 oz)   BMI 28.94 kg/m    GENERAL: Healthy appearing, alert, no acute distress, normal habitus.  CARDIOVASCULAR: Extremities warm and well perfused. Pulses present.   NEUROLOGICAL:  Patient is awake and grossly oriented to self, place and time.  Attention span is normal.  Memory is grossly intact.  Language is fluent and follows commands appropriately.  Appropriate fund  of knowledge. Cranial nerves 2-12 are intact. There is no pronator drift.  Motor exam shows 5/5 strength in all extremities.  Tone is symmetric bilaterally in upper and lower extremities.  (Previously reflexes are symmetric and 2+ in upper extremities and lower extremities. Sensory exam is grossly intact to light touch, pin prick and vibration.) Finger to nose and heel to shin is without dysmetria.  Romberg is negative.  Gait is normal and the patient is able to do tandem walk and walk on toes and heels.  Exam unchanged compared to before    DIAGNOSTICS  RELEVANT LABS  Recent BMP shows elevated creatinine of 1.21.  TSH normal in the past.    OUTSIDE RECORDS  Notes from Dr. Guerra reviewed. Pertinent information is noted in the HPI.     MRI brain-images reviewed with patient.  She does have low-lying cerebellar tonsils though these are very minimal.  No major structural lesions noted.  IMPRESSION:  1.  No evidence of an acute intracranial abnormality.  2.  Low-lying cerebellar tonsils as above.    IMPRESSION/REPORT/PLAN  Intractable chronic migraine without aura and without status migrainosus  Obesity  Concurrent use of multiple antidepressants  Low-lying cerebellar tonsils  Worsening headaches because of Botox    This is a 50 year old female with headache suggestive of chronic migraines that are refractory to multiple medications.  MRI brain was negative for structural lesions.  Does show low-lying cerebellar tonsils though most likely these are asymptomatic.     Prevention of headaches she has tried Topamax, propranolol, Ajovy, Nurtec without significant benefit.  Currently is on Botox, trigger points which are helpful.  Verapamil did not provide any significant benefit and she does complain of lightheadedness and this has been stopped.  She cannot use other antidepressants as she is already on antidepressants.  Could consider trial of Depakote there is concerns for weight gain.  Will trial a bit higher dose of trigger  "points in the trapezius as that has been helpful and will see if that helps prevent the multiday headaches.  Previously 200 units of Botox was approved but now insurance is only approving the 155 which might have made the headaches worse.  Insurance is now approving the 200 units again and we will see if that works better for her.  Continue previous trigger point paradigm today and give the higher dose of Botox 1 more try and see how she does.  Can increase the dose in the future if needed.    For abortive therapy she has tried Imitrex, Zomig, Florinol which have not been helpful.  Combination of Maxalt and Nurtec is helpful and will continue.  Cannot try ibuprofen.  Uses Tylenol for less severe headaches.  Will add Toradol as a rescue medication.  Could consider steroids.  Steroids have been helpful though because of her gastric sleeve will use these very sparingly.    Another dose of prednisone was prescribed since she would be late for her next Botox.     Encourage her to keep a log of her headaches.  Alternate between trigger points and Botox.    -    Ibuprofen 400 mg initially and then Maxalt in 1 hour.  -     Botulinum Toxin Type A (BOTOX) 200 units injection 200 Units  (STOP)  -     rizatriptan (MAXALT) 10 MG tablet; Take 1 tablet (10 mg) by mouth at onset of headache for migraine May repeat in 2 hours.  -     rimegepant (NURTEC) 75 MG ODT tablet; Take 1 tablet (75 mg) by mouth every 48 hours  Trigger point injections    -     ketorolac (TORADOL) 10 MG tablet; PRN for headaches. Not more than 1 tablet/day or 3 tablets/week or more than 6 times a month.  -Prednisone for status migrainosus.     Return in about 3 months (around 7/7/2025) for In-Clinic Visit (must), Trigger point injections.     Botox PA  \" The patient has a diagnosis of chronic migraines. She has more than 15 headache days a month with each headache lasting at least 4 hours despite use of triptans. Her headaches have been there for over 6 " "months. She has been screened for medication overuse headaches and she does not have that. She has tried Topamax, Propranolol for 3 months without any significant benefit. Antidepressants cannot be added since she is on multiple antidepressants already. She has tried Ajovy with minimal success. I would request that the Botox be approved for her.    She has had about 50% improvement with 155 units of Botox.  I will request a higher dose of Botox to be approved to see if she can get even further benefit.  70-80% improvement with the higher dose of Botox.  \"    Over 31 minutes were spent coordinating the care for the patient on the day of the encounter.  This includes previsit, during visit and post visit activities as documented above.  Counseling patient.  Reviewing chart.  Prescription management.  Question of dose of trigger point injections.  Reviewing Botox prior authorization for next Botox  (Activities include but not inclusive of reviewing chart, reviewing outside records, reviewing labs and imaging study results as well as the images, patient visit time including getting history and exam,  use if applicable, review of test results with the patient and coming up with a plan in a shared model, counseling patient and family, education and answering patient questions, EMR , EMR diagnosis entry and problem list management, medication reconciliation and prescription management if applicable, paperwork if applicable, printing documents and documentation of the visit activities.)        Edi Rojo MD  Neurologist  [facility]  Tel:- [phone]    This note was dictated using voice recognition software.  Any grammatical or context distortions are unintentional and inherent to the software.    "

## 2025-04-07 NOTE — LETTER
4/7/2025      Efra Ames  [address] [facility] [address]      Dear Colleague,    Thank you for referring your patient, Efra Ames, to the [facility]UROLOGY [facility] [place]. Please see a copy of my visit note below.    NEUROLOGY OUTPATIENT PROGRESS NOTE   Apr 7, 2025     CHIEF COMPLAINT/REASON FOR VISIT/REASON FOR CONSULT  Patient presents with:  Headache: Trigger point     REASON FOR CONSULTATION- Headaches    REFERRAL SOURCE  Dr. Guerra  CC Dr. Guerra    HISTORY OF PRESENT ILLNESS  Efra Ames is a 50 year old female seen today for evaluation of headaches. She reports that she has been having headaches since age 12. Previously was seen by Dr. Cuadra. Headaches have been 20 days a month. She was put on Ajovy by Dr. Guerra which reduce the headaches to 10 days a month but currently she is switching insurances and this has been stopped.    Headaches are generally bitemporal. They can also be in the frontal region. Sometimes it is in the middle of the head. Headaches are more of a pressure and occasional throbbing. There is occasional visual auras. She reports associated photophobia phonophobia and nausea. Occasionally will get dizziness and even fall down with the headaches. Denies any clear triggers. Does have mental illness which can affect the headaches. She will get difficulty with thinking when she has a bad headache.    Patient is tried Topamax which she did not tolerate has also been on propanolol which she did not tolerate. Has not tried nortriptyline or amitriptyline. Has been on multiple antidepressants for her depression and anxiety. Currently does not want to add more antidepressants due to weight gain issues and being on multiple antidepressants. For abortive therapy she has been on Zomig which did not work. Has also tried Imitrex which did not work. Has tried over-the-counter Tylenol and ibuprofen without benefit.    10/25/21  Patient returns today.  She reports that she  continues to have 25 headache days a month.  She is interested in doing the Botox today but is also interested in trying the Ajovy 1 more time.  She is comfortable with the needles but feels that the Parminder was helping and works in a different mechanism.  Ajovy was stopped last year because the insurance would not cover it anymore.  She reports that the headaches had improved with Ajovy but not completely.  Had questions about the Maxalt.  Headaches have been lasting multiple days.  Headache does become more tolerable after she takes Maxalt for day 1 and wonders if she can take it on day 2 or not.  Discussed with her about taking the Maxalt as soon as she can and repeating it in 2 hours.  She can take the Maxalt the next day the discussed that it might not be as effective as it was on day 1.  She reports no other new issues.    12/27/21  Patient returns today.  She previously was having 25 headache days a month but now is having 16-18 headache days a month.  Feels that the Botox has reduced the edge and headaches are less severe.  Continues to have headaches on the right side.  Occasionally will get the visual auras.    Is on Maxalt but feels that it occasionally will not work.  Is not using anything with the Maxalt.  Does not feel she has enough medication for the whole month.  Is interested in trying new medications.  Given that she gets limited supply of Maxalt she cannot predict which headaches are going to be severe enough that she wants to use the Maxalt or not.  Does daily using the Maxalt.    We discussed the Ajovy and it was decided that we will hold off on that for right now since she is trying the Botox and trying to medications at the same time might not be very beneficial.    9/29/22  To distribute Botox was done 2 months ago.  She has had 4 sessions of Botox.  Reports that the headaches are 3-4 times a week but she is not having 6 to 7 days of continuous headaches.  Headaches can last the full day.  She  generally uses Nurtec, ibuprofen or Maxalt in various combinations depending on the severity of her headaches.  Is interested in trying other medications to see if the headaches can be under better control.  Botox with each session is making things better.  Denies any side effects to the Botox.  No other new symptoms.    4/3/23  Patient returns today.  She has had about 6 sessions of Botox.  The higher dose of Botox has been more effective.  She is having 5-6 bad headaches in the month.  Does have minor headaches couple of times a week.  Is using ibuprofen for those.  Nurtec works the best for abortive therapy.  Relpax has not been so effective.  Maxalt has been more effective.  Wants to continue with the Maxalt.  No other new symptoms.  Headaches are much less severe compared to before with the Botox though continues to be in the same frequency.  Wants to try the Botox for few more sessions.    10/31/23  Patient returns today.  With the last session of Botox she had headaches every day but these are much less severe compared to before.  More tolerable.  Nurtec is also helping her preventive medication.  Maxalt does work as abortive therapy.  Wants to continue the current plan.  Discussed about adding other preventive medications and willing to try verapamil.  Topamax and propanolol have been used in the past.  No other new concerns.  No other provoking factors other than the weather.    3/26/24  Patient returns today  1.  Feels that the headaches are slowly getting better with the Botox but continues to have the headache still quite frequently.  Maxalt does work as abortive therapy.  She has found benefit with the verapamil as well.  No side effects.  Combination with Nurtec has been beneficial.  Headaches are overall slowly getting better with the treatments.  2.  Has not found any new triggers for headaches.  3.  Is interested in trying trigger point injections today.  No other further  concerns.    6/14/24  Patient returns today  1.  Trigger point injections have been helpful.  She wants to continue it.  Wants injections done in the longissimus muscle  2.  Botox has been helping significantly with the headaches.  The headaches are still very frequent but less severe.  3.  Remains on the verapamil.  Is finding some benefit though cannot say for sure  4.  Nurtec as a preventative is not very helpful though as an abortive medication is very helpful.  Has to use it more than 8 times a month.  Additional Maxalt as needed for more severe headaches  No new medications    10/7/24  Patient returns for  1.  Headaches are still 15-18 headache days a month though they are less severe compared to before.  They are not lasting multiple days.  He is using Maxalt/Nurtec for abortive therapy  2.  Uses verapamil for prevention of headaches in addition to the Botox and trigger point injections.  This finding the combination useful.  Trigger point injections are more helpful than the Botox though wants to continue the Botox as well.  No other new concerns.    1/6/24  Patient returns today  1.  Headaches are about 3 times a week.  They are much less severe compared to before she has had 2 episodes of really severe headaches lasting for 2 weeks.  Her Maxalt and Nurtec did not work this time.  Verapamil has been helpful.  Botox and trigger point are also helping.  Feels that the headaches are more in the frontal region.  Trigger points prevent the headaches in the neck region.  She wants to try a higher dose in the trapezius today.  No other new concerns.    2/20/25  Patient returns today  1.  Headaches are about the same compared to before.  She stopped the verapamil as she did not find a lot of benefit and was feeling lightheaded.  Stopping the verapamil has not caused any problems.  Headaches have been more severe in the last week with the Botox wearing off.  She was put on prednisone as she had called the clinic and  that has significantly helped.  The headache behind the eye is also better.  She has 1 more day of prednisone left.  Discussed limitations and using prednisone on a regular basis.  Botox 200 units is improved and we will try that higher dose today and see how she does.  No other new concerns.    4/7/25  Patient returns today.  1.  Headaches have not significantly improved since the higher dose of Botox.  She still has 4-5 headaches a week.  Feels that the headaches are less severe and lasting less longer compared to before.  No side effects with the higher dose of Botox.  We discussed changing the dose of the trigger points it was decided to keep her on the same dose for right now and try the Botox 1 more time and then decide.  She is traveling to [place] and will be late on her Botox and is requesting prednisone to cover her for this 2 weeks.  Other medications are helpful no change.  No new concerns.    Previous history is reviewed and this is unchanged.    PAST MEDICAL/SURGICAL HISTORY  No past medical history on file.  Patient Active Problem List   Diagnosis     Allergic rhinitis     Anisometropia     Anxiety state     Carpal tunnel syndrome     Classical migraine with intractable migraine     Gluten intolerance     History of strabismus surgery     Hypotropia of left eye     Insomnia     Migraine headache     Mild persistent asthma     Moderate episode of recurrent major depressive disorder (H)     Pre-diabetes     Morbid obesity (H)   Significant depression, anxiety, prediabetes, gluten intolerance, high cholesterol    FAMILY HISTORY  Family History   Problem Relation Age of Onset     Migraines Mother    Positive for mother and grandmother with migraines. Daughter with migraines    SOCIAL HISTORY  Social History     Tobacco Use     Smoking status: Never     Smokeless tobacco: Never   Substance Use Topics     Alcohol use: Not Currently       SYSTEMS REVIEW  Twelve-system ROS was done and other than the HPI  this was negative except for numbness and tingling, balance coordination problems, dizziness, memory loss, anxiety, depression, respiratory problems/asthma.  No new symptoms/issues.    MEDICATIONS  Current Outpatient Medications   Medication Sig Dispense Refill     albuterol (PROAIR HFA/PROVENTIL HFA/VENTOLIN HFA) 108 (90 Base) MCG/ACT inhaler Inhale 2 puffs into the lungs       atorvastatin (LIPITOR) 40 MG tablet Take 40 mg by mouth daily.       calcium citrate-vitamin D (CITRACAL) 315-6.25 MG-MCG TABS per tablet Take by mouth 2 times daily       cetirizine (ZYRTEC) 10 MG tablet Take 10-20 mg by mouth       cholecalciferol 50 MCG (2000 UT) CAPS 3 daily       ketorolac (TORADOL) 10 MG tablet PRN for headaches. Not more than 1 tablet/day or 3 tablets/week or more than 6 times a month. 20 tablet 1     lisdexamfetamine (VYVANSE) 30 MG capsule Take 30 mg by mouth every morning.       predniSONE (DELTASONE) 20 MG tablet Take 3 tablets (60 mg) by mouth daily for 6 days. Take in AM with food. 18 tablet 0     rimegepant (NURTEC) 75 MG ODT tablet Place 1 tablet (75 mg) under the tongue every 48 hours 16 tablet 3     rizatriptan (MAXALT) 10 MG tablet Take 1 tablet by mouth at onset of migraine. May repeat in 2 hours. 18 tablet 5     traZODone (DESYREL) 100 MG tablet Take 100 mg by mouth       traZODone (DESYREL) 50 MG tablet Take 50 mg by mouth at bedtime.       AUVELITY  MG TBCR Take  mg by mouth 2 times daily (Patient not taking: Reported on 4/7/2025)       DULoxetine (CYMBALTA) 60 MG capsule Take 60 mg by mouth (Patient not taking: Reported on 4/7/2025)       fluticasone-salmeterol (ADVAIR HFA) 230-21 MCG/ACT inhaler Inhale 1 puff into the lungs. (Patient not taking: Reported on 4/7/2025)       No current facility-administered medications for this visit.        PHYSICAL EXAMINATION  VITALS: BP 99/60   Pulse 83   Wt 78.9 kg (173 lb 14.4 oz)   BMI 28.94 kg/m    GENERAL: Healthy appearing, alert, no acute  distress, normal habitus.  CARDIOVASCULAR: Extremities warm and well perfused. Pulses present.   NEUROLOGICAL:  Patient is awake and grossly oriented to self, place and time.  Attention span is normal.  Memory is grossly intact.  Language is fluent and follows commands appropriately.  Appropriate fund of knowledge. Cranial nerves 2-12 are intact. There is no pronator drift.  Motor exam shows 5/5 strength in all extremities.  Tone is symmetric bilaterally in upper and lower extremities.  (Previously reflexes are symmetric and 2+ in upper extremities and lower extremities. Sensory exam is grossly intact to light touch, pin prick and vibration.) Finger to nose and heel to shin is without dysmetria.  Romberg is negative.  Gait is normal and the patient is able to do tandem walk and walk on toes and heels.  Exam unchanged compared to before    DIAGNOSTICS  RELEVANT LABS  Recent BMP shows elevated creatinine of 1.21.  TSH normal in the past.    OUTSIDE RECORDS  Notes from Dr. Guerra reviewed. Pertinent information is noted in the HPI.     MRI brain-images reviewed with patient.  She does have low-lying cerebellar tonsils though these are very minimal.  No major structural lesions noted.  IMPRESSION:  1.  No evidence of an acute intracranial abnormality.  2.  Low-lying cerebellar tonsils as above.    IMPRESSION/REPORT/PLAN  Intractable chronic migraine without aura and without status migrainosus  Obesity  Concurrent use of multiple antidepressants  Low-lying cerebellar tonsils  Worsening headaches because of Botox    This is a 50 year old female with headache suggestive of chronic migraines that are refractory to multiple medications.  MRI brain was negative for structural lesions.  Does show low-lying cerebellar tonsils though most likely these are asymptomatic.     Prevention of headaches she has tried Topamax, propranolol, Ajovy, Nurtec without significant benefit.  Currently is on Botox, trigger points which are helpful.   Verapamil did not provide any significant benefit and she does complain of lightheadedness and this has been stopped.  She cannot use other antidepressants as she is already on antidepressants.  Could consider trial of Depakote there is concerns for weight gain.  Will trial a bit higher dose of trigger points in the trapezius as that has been helpful and will see if that helps prevent the multiday headaches.  Previously 200 units of Botox was approved but now insurance is only approving the 155 which might have made the headaches worse.  Insurance is now approving the 200 units again and we will see if that works better for her.  Continue previous trigger point paradigm today and give the higher dose of Botox 1 more try and see how she does.  Can increase the dose in the future if needed.    For abortive therapy she has tried Imitrex, Zomig, Florinol which have not been helpful.  Combination of Maxalt and Nurtec is helpful and will continue.  Cannot try ibuprofen.  Uses Tylenol for less severe headaches.  Will add Toradol as a rescue medication.  Could consider steroids.  Steroids have been helpful though because of her gastric sleeve will use these very sparingly.    Another dose of prednisone was prescribed since she would be late for her next Botox.     Encourage her to keep a log of her headaches.  Alternate between trigger points and Botox.    -    Ibuprofen 400 mg initially and then Maxalt in 1 hour.  -     Botulinum Toxin Type A (BOTOX) 200 units injection 200 Units  (STOP)  -     rizatriptan (MAXALT) 10 MG tablet; Take 1 tablet (10 mg) by mouth at onset of headache for migraine May repeat in 2 hours.  -     rimegepant (NURTEC) 75 MG ODT tablet; Take 1 tablet (75 mg) by mouth every 48 hours  Trigger point injections    -     ketorolac (TORADOL) 10 MG tablet; PRN for headaches. Not more than 1 tablet/day or 3 tablets/week or more than 6 times a month.  -Prednisone for status migrainosus.     Return in about 3  "months (around 7/7/2025) for In-Clinic Visit (must), Trigger point injections.     Botox PA  \" The patient has a diagnosis of chronic migraines. She has more than 15 headache days a month with each headache lasting at least 4 hours despite use of triptans. Her headaches have been there for over 6 months. She has been screened for medication overuse headaches and she does not have that. She has tried Topamax, Propranolol for 3 months without any significant benefit. Antidepressants cannot be added since she is on multiple antidepressants already. She has tried Ajovy with minimal success. I would request that the Botox be approved for her.    She has had about 50% improvement with 155 units of Botox.  I will request a higher dose of Botox to be approved to see if she can get even further benefit.  70-80% improvement with the higher dose of Botox.  \"    Over 31 minutes were spent coordinating the care for the patient on the day of the encounter.  This includes previsit, during visit and post visit activities as documented above.  Counseling patient.  Reviewing chart.  Prescription management.  Question of dose of trigger point injections.  Reviewing Botox prior authorization for next Botox  (Activities include but not inclusive of reviewing chart, reviewing outside records, reviewing labs and imaging study results as well as the images, patient visit time including getting history and exam,  use if applicable, review of test results with the patient and coming up with a plan in a shared model, counseling patient and family, education and answering patient questions, EMR , EMR diagnosis entry and problem list management, medication reconciliation and prescription management if applicable, paperwork if applicable, printing documents and documentation of the visit activities.)        Edi Rojo MD  Neurologist  [facility] Neurology [facility]  Tel:- [phone]    This note was " dictated using voice recognition software.  Any grammatical or context distortions are unintentional and inherent to the software.      Again, thank you for allowing me to participate in the care of your patient.        Sincerely,        Edi Rojo MD    Electronically signed

## 2025-05-21 ENCOUNTER — MEDICAL CORRESPONDENCE (OUTPATIENT)
Dept: HEALTH INFORMATION MANAGEMENT | Facility: CLINIC | Age: 50
End: 2025-05-21
Payer: COMMERCIAL

## 2025-05-28 ENCOUNTER — OFFICE VISIT (OUTPATIENT)
Dept: NEUROLOGY | Facility: CLINIC | Age: 50
End: 2025-05-28
Payer: COMMERCIAL

## 2025-05-28 VITALS
WEIGHT: 164.7 LBS | BODY MASS INDEX: 27.41 KG/M2 | DIASTOLIC BLOOD PRESSURE: 78 MMHG | HEART RATE: 86 BPM | SYSTOLIC BLOOD PRESSURE: 120 MMHG

## 2025-05-28 DIAGNOSIS — G43.719 INTRACTABLE CHRONIC MIGRAINE WITHOUT AURA AND WITHOUT STATUS MIGRAINOSUS: Primary | ICD-10-CM

## 2025-05-28 PROCEDURE — 64615 CHEMODENERV MUSC MIGRAINE: CPT | Performed by: PSYCHIATRY & NEUROLOGY

## 2025-05-28 NOTE — PROCEDURES
NEUROLOGY PROCEDURE NOTE  May 28, 2025      Chronic migraine Botox injection    CONSENT: The procedure was explained to the patient. The risks and benefits of the procedure and the alternatives were discussed with the patient. The patient was given an opportunity to ask any questions she had about the procedure. A verbal consent was obtained from the patient. A written consent is available in the chart.    PRE-PROCEDURE  Diagnosis: Chronic migraine  Headache frequency before the use of Botox:- 25 headache days per month  Headache frequency with Botox use:- 70-80% improvement in first 2 months, some wearing off in last 3 weeks.     EXAM  GENERAL: Healthy appearing, alert, no acute distress, normal habitus.  NEUROLOGICAL:  Patient is alert with fluent language.  Extraocular movements are intact.  Facial movements are present and symmetric.  No arm drift.    PROCEDURE SUMMARY:   The following muscles were identified after palpating for landmarks and the overlying skin was cleansed with alcohol. These muscles were then injected using a 30 guage- half  inch needle with the following doses of onabotulinumtoxin A. A 5 unit/ 0.1 ml dilution was used for the injections. A 2 x 100 unit vial was used.    Corrugators 5 units each side.  Procerus 5 units.  Frontalis 10 units each side.  Temporalis 25 units each side.  Occipitalis 15 units each side.  Paraspinals 10 units each side.  Trapezius 30 units each side.    Units Injected: 195 Units  Units Discarded: 5 Units  Lot Number and Expiration: [id number]; 10/2027    The patient tolerated the procedure without any immediate complaints and will call the Neurology clinic if she has any problems or side effects from the medication. The patient will follow up in the Neurology clinic as previously decided.      Edi Rojo MD  Neurologist  [facility]  [phone]

## 2025-05-28 NOTE — LETTER
5/28/2025      Efra Ames  [address] [facility] [address]      Dear Colleague,    Thank you for referring your patient, Efra Ames, to the [facility] NEUROLOGY CLINIC [place]. Please see a copy of my visit note below.    See procedure note.     Again, thank you for allowing me to participate in the care of your patient.        Sincerely,        Edi Rojo MD    Electronically signed

## 2025-05-28 NOTE — NURSING NOTE
"Efra Ames is a 50 year old female who presents for:  Chief Complaint   Patient presents with    Botox Migraine     No concerns.         Initial Vitals:  /78   Pulse 86   Wt 74.7 kg (164 lb 11.2 oz)   BMI 27.41 kg/m   Estimated body mass index is 27.41 kg/m  as calculated from the following:    Height as of 2/20/25: 1.651 m (5' 5\").    Weight as of this encounter: 74.7 kg (164 lb 11.2 oz).. Body surface area is 1.85 meters squared. BP completed using cuff size: wrist cuff    Jelani Velasco  "

## 2025-07-07 ENCOUNTER — OFFICE VISIT (OUTPATIENT)
Dept: NEUROLOGY | Facility: CLINIC | Age: 50
End: 2025-07-07
Payer: COMMERCIAL

## 2025-07-07 VITALS
WEIGHT: 159.2 LBS | BODY MASS INDEX: 26.52 KG/M2 | HEIGHT: 65 IN | HEART RATE: 90 BPM | DIASTOLIC BLOOD PRESSURE: 58 MMHG | SYSTOLIC BLOOD PRESSURE: 84 MMHG

## 2025-07-07 DIAGNOSIS — R55 SPELL OF LOSS OF CONSCIOUSNESS: ICD-10-CM

## 2025-07-07 DIAGNOSIS — M79.18 MYALGIA, OTHER SITE: ICD-10-CM

## 2025-07-07 DIAGNOSIS — G43.719 INTRACTABLE CHRONIC MIGRAINE WITHOUT AURA AND WITHOUT STATUS MIGRAINOSUS: Primary | ICD-10-CM

## 2025-07-07 RX ORDER — BUPIVACAINE HYDROCHLORIDE 2.5 MG/ML
6 INJECTION, SOLUTION EPIDURAL; INFILTRATION; INTRACAUDAL; PERINEURAL ONCE
Status: COMPLETED | OUTPATIENT
Start: 2025-07-07 | End: 2025-07-07

## 2025-07-07 RX ADMIN — BUPIVACAINE HYDROCHLORIDE 15 MG: 2.5 INJECTION, SOLUTION EPIDURAL; INFILTRATION; INTRACAUDAL; PERINEURAL at 09:09

## 2025-07-07 NOTE — PROCEDURES
NEUROLOGY PROCEDURE NOTE  Jul 7, 2025      Trigger Point injection    CONSENT: The procedure was explained to the patient. The risks and benefits of the procedure and the alternatives were discussed with the patient. The patient was given an opportunity to ask any questions they had about the procedure. A verbal consent was obtained from the patient. A written consent is available in the chart.    PRE-PROCEDURE  Diagnosis: Neck pain/Trigger point/Migraine Headaches/Myositis    EXAM  GENERAL: Healthy appearing, alert, no acute distress, normal habitus.  NEUROLOGICAL:  Patient is alert with fluent language.  Extraocular movements are intact.  Facial movements are present and symmetric.  No arm drift.    PROCEDURE SUMMARY:   The following trigger points were identified after palpating for landmarks and the overlying skin was cleansed with alcohol. These muscles were then injected using a 30 guage- half inch needle with the following doses of bupivacaine 0.25% preservative-free.     Left trapezius 1 ml  Left medial trapezius 1 mL  Right trapezius 1 ml  Right medial trapezius 1 mL  Left longissimus 0.5 mL  Right longissimus 0.5 mL  Left cervical trapezius 0.5 mL  Right cervical trapezius 0.5 mL    Total injected 6 mL  Lot Number and Expiration: [id number]; 6/27    The patient tolerated the procedure without any immediate complaints and will call the Neurology clinic if she has any problems or side effects from the medication.  The patient noticed some improvement in the pain right after the injection.  The patient will follow up in the Neurology clinic as previously decided.      Edi Rojo MD  Neurologist  [facility] Neurology [facility]  [phone]

## 2025-07-07 NOTE — PROGRESS NOTES
NEUROLOGY OUTPATIENT PROGRESS NOTE   Jul 7, 2025     CHIEF COMPLAINT/REASON FOR VISIT/REASON FOR CONSULT  Patient presents with:  Headache: Pt states headache had been worse since the last time she was in. Pt currently has a headache.     REASON FOR CONSULTATION- Headaches    REFERRAL SOURCE  Dr. Guerra  CC Dr. Guerra    HISTORY OF PRESENT ILLNESS  Efra Ames is a 50 year old female seen today for evaluation of headaches. She reports that she has been having headaches since age 12. Previously was seen by Dr. Cuadra. Headaches have been 20 days a month. She was put on Ajovy by Dr. Guerra which reduce the headaches to 10 days a month but currently she is switching insurances and this has been stopped.    Headaches are generally bitemporal. They can also be in the frontal region. Sometimes it is in the middle of the head. Headaches are more of a pressure and occasional throbbing. There is occasional visual auras. She reports associated photophobia phonophobia and nausea. Occasionally will get dizziness and even fall down with the headaches. Denies any clear triggers. Does have mental illness which can affect the headaches. She will get difficulty with thinking when she has a bad headache.    Patient is tried Topamax which she did not tolerate has also been on propanolol which she did not tolerate. Has not tried nortriptyline or amitriptyline. Has been on multiple antidepressants for her depression and anxiety. Currently does not want to add more antidepressants due to weight gain issues and being on multiple antidepressants. For abortive therapy she has been on Zomig which did not work. Has also tried Imitrex which did not work. Has tried over-the-counter Tylenol and ibuprofen without benefit.    10/25/21  Patient returns today.  She reports that she continues to have 25 headache days a month.  She is interested in doing the Botox today but is also interested in trying the Ajovy 1 more time.  She is comfortable with the  needles but feels that the Parminder was helping and works in a different mechanism.  Ajovy was stopped last year because the insurance would not cover it anymore.  She reports that the headaches had improved with Ajovy but not completely.  Had questions about the Maxalt.  Headaches have been lasting multiple days.  Headache does become more tolerable after she takes Maxalt for day 1 and wonders if she can take it on day 2 or not.  Discussed with her about taking the Maxalt as soon as she can and repeating it in 2 hours.  She can take the Maxalt the next day the discussed that it might not be as effective as it was on day 1.  She reports no other new issues.    12/27/21  Patient returns today.  She previously was having 25 headache days a month but now is having 16-18 headache days a month.  Feels that the Botox has reduced the edge and headaches are less severe.  Continues to have headaches on the right side.  Occasionally will get the visual auras.    Is on Maxalt but feels that it occasionally will not work.  Is not using anything with the Maxalt.  Does not feel she has enough medication for the whole month.  Is interested in trying new medications.  Given that she gets limited supply of Maxalt she cannot predict which headaches are going to be severe enough that she wants to use the Maxalt or not.  Does daily using the Maxalt.    We discussed the Ajovy and it was decided that we will hold off on that for right now since she is trying the Botox and trying to medications at the same time might not be very beneficial.    9/29/22  To distribute Botox was done 2 months ago.  She has had 4 sessions of Botox.  Reports that the headaches are 3-4 times a week but she is not having 6 to 7 days of continuous headaches.  Headaches can last the full day.  She generally uses Nurtec, ibuprofen or Maxalt in various combinations depending on the severity of her headaches.  Is interested in trying other medications to see if the  headaches can be under better control.  Botox with each session is making things better.  Denies any side effects to the Botox.  No other new symptoms.    4/3/23  Patient returns today.  She has had about 6 sessions of Botox.  The higher dose of Botox has been more effective.  She is having 5-6 bad headaches in the month.  Does have minor headaches couple of times a week.  Is using ibuprofen for those.  Nurtec works the best for abortive therapy.  Relpax has not been so effective.  Maxalt has been more effective.  Wants to continue with the Maxalt.  No other new symptoms.  Headaches are much less severe compared to before with the Botox though continues to be in the same frequency.  Wants to try the Botox for few more sessions.    10/31/23  Patient returns today.  With the last session of Botox she had headaches every day but these are much less severe compared to before.  More tolerable.  Nurtec is also helping her preventive medication.  Maxalt does work as abortive therapy.  Wants to continue the current plan.  Discussed about adding other preventive medications and willing to try verapamil.  Topamax and propanolol have been used in the past.  No other new concerns.  No other provoking factors other than the weather.    3/26/24  Patient returns today  1.  Feels that the headaches are slowly getting better with the Botox but continues to have the headache still quite frequently.  Maxalt does work as abortive therapy.  She has found benefit with the verapamil as well.  No side effects.  Combination with Nurtec has been beneficial.  Headaches are overall slowly getting better with the treatments.  2.  Has not found any new triggers for headaches.  3.  Is interested in trying trigger point injections today.  No other further concerns.    6/14/24  Patient returns today  1.  Trigger point injections have been helpful.  She wants to continue it.  Wants injections done in the longissimus muscle  2.  Botox has been  helping significantly with the headaches.  The headaches are still very frequent but less severe.  3.  Remains on the verapamil.  Is finding some benefit though cannot say for sure  4.  Nurtec as a preventative is not very helpful though as an abortive medication is very helpful.  Has to use it more than 8 times a month.  Additional Maxalt as needed for more severe headaches  No new medications    10/7/24  Patient returns for  1.  Headaches are still 15-18 headache days a month though they are less severe compared to before.  They are not lasting multiple days.  He is using Maxalt/Nurtec for abortive therapy  2.  Uses verapamil for prevention of headaches in addition to the Botox and trigger point injections.  This finding the combination useful.  Trigger point injections are more helpful than the Botox though wants to continue the Botox as well.  No other new concerns.    1/6/24  Patient returns today  1.  Headaches are about 3 times a week.  They are much less severe compared to before she has had 2 episodes of really severe headaches lasting for 2 weeks.  Her Maxalt and Nurtec did not work this time.  Verapamil has been helpful.  Botox and trigger point are also helping.  Feels that the headaches are more in the frontal region.  Trigger points prevent the headaches in the neck region.  She wants to try a higher dose in the trapezius today.  No other new concerns.    2/20/25  Patient returns today  1.  Headaches are about the same compared to before.  She stopped the verapamil as she did not find a lot of benefit and was feeling lightheaded.  Stopping the verapamil has not caused any problems.  Headaches have been more severe in the last week with the Botox wearing off.  She was put on prednisone as she had called the clinic and that has significantly helped.  The headache behind the eye is also better.  She has 1 more day of prednisone left.  Discussed limitations and using prednisone on a regular basis.  Botox  200 units is improved and we will try that higher dose today and see how she does.  No other new concerns.    4/7/25  Patient returns today.  1.  Headaches have not significantly improved since the higher dose of Botox.  She still has 4-5 headaches a week.  Feels that the headaches are less severe and lasting less longer compared to before.  No side effects with the higher dose of Botox.  We discussed changing the dose of the trigger points it was decided to keep her on the same dose for right now and try the Botox 1 more time and then decide.  She is traveling to [place] and will be late on her Botox and is requesting prednisone to cover her for this 2 weeks.  Other medications are helpful no change.  No new concerns.    7/7/25  Patient returns today  1.  Continues to have a few headaches a week.  Botox has been helping.  Things are much better with the higher dose of Botox and the lower dose of Botox.  Trigger point injections have also been helpful more with the shoulder tension.  She continues to have a right sided headache behind the eye which is unrelated to the Botox.  As Botox does not affect this.  Nurtec and Maxalt are helpful for abortive therapy.  Overall no side effects with the medication.  Stopping the verapamil has not really made any difference with the headaches.  She is recently had a spell of passing out and currently is being evaluated through cardiology.  Does not want to try any new medications till this can be figured out  No other new concerns    Previous history is reviewed and this is unchanged.    PAST MEDICAL/SURGICAL HISTORY  No past medical history on file.  Patient Active Problem List   Diagnosis    Allergic rhinitis    Anisometropia    Anxiety state    Carpal tunnel syndrome    Classical migraine with intractable migraine    Gluten intolerance    History of strabismus surgery    Hypotropia of left eye    Insomnia    Migraine headache    Mild persistent asthma    Moderate episode of  recurrent major depressive disorder (H)    Pre-diabetes    Morbid obesity (H)   Significant depression, anxiety, prediabetes, gluten intolerance, high cholesterol    FAMILY HISTORY  Family History   Problem Relation Age of Onset    Migraines Mother    Positive for mother and grandmother with migraines. Daughter with migraines    SOCIAL HISTORY  Social History     Tobacco Use    Smoking status: Never    Smokeless tobacco: Never   Substance Use Topics    Alcohol use: Not Currently       SYSTEMS REVIEW  Twelve-system ROS was done and other than the HPI this was negative except for numbness and tingling, balance coordination problems, dizziness, memory loss, anxiety, depression, respiratory problems/asthma.  No new symptoms/issues.    MEDICATIONS  Current Outpatient Medications   Medication Sig Dispense Refill    albuterol (PROAIR HFA/PROVENTIL HFA/VENTOLIN HFA) 108 (90 Base) MCG/ACT inhaler Inhale 2 puffs into the lungs      atorvastatin (LIPITOR) 40 MG tablet Take 40 mg by mouth daily.      calcium citrate-vitamin D (CITRACAL) 315-6.25 MG-MCG TABS per tablet Take by mouth 2 times daily      cetirizine (ZYRTEC) 10 MG tablet Take 10-20 mg by mouth      cholecalciferol 50 MCG (2000 UT) CAPS 3 daily      fluticasone-salmeterol (ADVAIR HFA) 230-21 MCG/ACT inhaler Inhale 1 puff into the lungs.      ketorolac (TORADOL) 10 MG tablet PRN for headaches. Not more than 1 tablet/day or 3 tablets/week or more than 6 times a month. 20 tablet 1    lisdexamfetamine (VYVANSE) 30 MG capsule Take 30 mg by mouth every morning.      rimegepant (NURTEC) 75 MG ODT tablet Place 1 tablet (75 mg) under the tongue every 48 hours 16 tablet 3    rizatriptan (MAXALT) 10 MG tablet Take 1 tablet by mouth at onset of migraine. May repeat in 2 hours. 18 tablet 5    traZODone (DESYREL) 50 MG tablet Take 50 mg by mouth at bedtime.      AUVELITY  MG TBCR Take  mg by mouth 2 times daily (Patient not taking: Reported on 5/28/2025)       "DULoxetine (CYMBALTA) 60 MG capsule Take 60 mg by mouth (Patient not taking: Reported on 5/28/2025)      traZODone (DESYREL) 100 MG tablet Take 100 mg by mouth       No current facility-administered medications for this visit.        PHYSICAL EXAMINATION  VITALS: BP (!) 84/58 (BP Location: Left arm, Patient Position: Sitting)   Pulse 90   Ht 1.651 m (5' 5\")   Wt 72.2 kg (159 lb 3.2 oz)   BMI 26.49 kg/m    GENERAL: Healthy appearing, alert, no acute distress, normal habitus.  CARDIOVASCULAR: Extremities warm and well perfused. Pulses present.   NEUROLOGICAL:  Patient is awake and grossly oriented to self, place and time.  Attention span is normal.  Memory is grossly intact.  Language is fluent and follows commands appropriately.  Appropriate fund of knowledge. Cranial nerves 2-12 are intact. There is no pronator drift.  Motor exam shows 5/5 strength in all extremities.  Tone is symmetric bilaterally in upper and lower extremities.  (Previously reflexes are symmetric and 2+ in upper extremities and lower extremities. Sensory exam is grossly intact to light touch, pin prick and vibration.) Finger to nose and heel to shin is without dysmetria.  Romberg is negative.  Gait is normal and the patient is able to do tandem walk and walk on toes and heels.  Exam unchanged compared to before    DIAGNOSTICS  RELEVANT LABS  Recent BMP shows elevated creatinine of 1.21.  TSH normal in the past.    OUTSIDE RECORDS  Notes from Dr. Guerra reviewed. Pertinent information is noted in the HPI.     MRI brain-images reviewed with patient.  She does have low-lying cerebellar tonsils though these are very minimal.  No major structural lesions noted.  IMPRESSION:  1.  No evidence of an acute intracranial abnormality.  2.  Low-lying cerebellar tonsils as above.    Reviewed ER notes.    IMPRESSION/REPORT/PLAN  Intractable chronic migraine without aura and without status migrainosus  Obesity  Concurrent use of multiple " antidepressants  Low-lying cerebellar tonsils  Syncopal spell    This is a 50 year old female with headache suggestive of chronic migraines that are refractory to multiple medications.  MRI brain was negative for structural lesions.  Does show low-lying cerebellar tonsils though most likely these are asymptomatic.     Prevention of headaches she has tried Topamax, propranolol, Ajovy, Nurtec without significant benefit.  Currently is on Botox, trigger points which are helpful.  Verapamil did not provide any significant benefit and she does complain of lightheadedness and this has been stopped.  She cannot use other antidepressants as she is already on antidepressants.  Could consider trial of Depakote there is concerns for weight gain.  Will trial a bit higher dose of trigger points in the trapezius as that has been helpful and will see if that helps prevent the multiday headaches.  Previously 200 units of Botox was approved but now insurance is only approving the 155 which might have made the headaches worse.  Insurance is now approving the 200 units again and this has been helpful with the trigger points and will continue.  Headaches is still on the appropriately good control but due to recent syncopal spell she wants to hold off on trying other medications.  Continue current dose of trigger points.    For abortive therapy she has tried Imitrex, Zomig, Florinol which have not been helpful.  Combination of Maxalt and Nurtec is helpful and will continue.  Cannot try ibuprofen.  Uses Tylenol for less severe headaches.  Will add Toradol as a rescue medication.  Could consider steroids.  Steroids have been helpful though because of her gastric sleeve will use these very sparingly.  Continue.     Encourage her to keep a log of her headaches.  Alternate between trigger points and Botox.    -    Ibuprofen 400 mg initially and then Maxalt in 1 hour.  -     Botulinum Toxin Type A (BOTOX) 200 units injection 200  "Units-FUTURE  -     rizatriptan (MAXALT) 10 MG tablet; Take 1 tablet (10 mg) by mouth at onset of headache for migraine May repeat in 2 hours.  -     rimegepant (NURTEC) 75 MG ODT tablet; Take 1 tablet (75 mg) by mouth every 48 hours  Trigger point injections  -     ketorolac (TORADOL) 10 MG tablet; PRN for headaches. Not more than 1 tablet/day or 3 tablets/week or more than 6 times a month.-To use sparingly  - Previously-prednisone for status migrainosus.     Return in about 3 months (around 10/7/2025) for In-Clinic Visit (must), Trigger point injections.     Botox PA  \" The patient has a diagnosis of chronic migraines. She has more than 15 headache days a month with each headache lasting at least 4 hours despite use of triptans. Her headaches have been there for over 6 months. She has been screened for medication overuse headaches and she does not have that. She has tried Topamax, Propranolol for 3 months without any significant benefit. Antidepressants cannot be added since she is on multiple antidepressants already. She has tried Ajovy with minimal success. I would request that the Botox be approved for her.    She has had about 50% improvement with 155 units of Botox.  I will request a higher dose of Botox to be approved to see if she can get even further benefit.  70-80% improvement with the higher dose of Botox.  \"    Over 30 minutes were spent coordinating the care for the patient on the day of the encounter.  This includes previsit, during visit and post visit activities as documented above.  Counseling patient.  Reviewing chart/prescription management.  Reviewing ER notes.  Refractory problem.  (Activities include but not inclusive of reviewing chart, reviewing outside records, reviewing labs and imaging study results as well as the images, patient visit time including getting history and exam,  use if applicable, review of test results with the patient and coming up with a plan in a shared " model, counseling patient and family, education and answering patient questions, EMR , EMR diagnosis entry and problem list management, medication reconciliation and prescription management if applicable, paperwork if applicable, printing documents and documentation of the visit activities.)        Edi Rojo MD  Neurologist  [facility]  Tel:- [phone]    This note was dictated using voice recognition software.  Any grammatical or context distortions are unintentional and inherent to the software.    The longitudinal plan of care for the diagnosis(es)/condition(s) as documented were addressed during this visit. Due to the added complexity in care, I will continue to support Efra in the subsequent management and with ongoing continuity of care.

## 2025-07-07 NOTE — LETTER
7/7/2025      Efra Ames  [address] [facility] [id number]      Dear Colleague,    Thank you for referring your patient, Efra Ames, to the [facility] [place]. Please see a copy of my visit note below.    NEUROLOGY OUTPATIENT PROGRESS NOTE   Jul 7, 2025     CHIEF COMPLAINT/REASON FOR VISIT/REASON FOR CONSULT  Patient presents with:  Headache: Pt states headache had been worse since the last time she was in. Pt currently has a headache.     REASON FOR CONSULTATION- Headaches    REFERRAL SOURCE  Dr. Guerra  CC Dr. Guerra    HISTORY OF PRESENT ILLNESS  Efra Ames is a 50 year old female seen today for evaluation of headaches. She reports that she has been having headaches since age 12. Previously was seen by Dr. Cuadra. Headaches have been 20 days a month. She was put on Ajovy by Dr. Guerra which reduce the headaches to 10 days a month but currently she is switching insurances and this has been stopped.    Headaches are generally bitemporal. They can also be in the frontal region. Sometimes it is in the middle of the head. Headaches are more of a pressure and occasional throbbing. There is occasional visual auras. She reports associated photophobia phonophobia and nausea. Occasionally will get dizziness and even fall down with the headaches. Denies any clear triggers. Does have mental illness which can affect the headaches. She will get difficulty with thinking when she has a bad headache.    Patient is tried Topamax which she did not tolerate has also been on propanolol which she did not tolerate. Has not tried nortriptyline or amitriptyline. Has been on multiple antidepressants for her depression and anxiety. Currently does not want to add more antidepressants due to weight gain issues and being on multiple antidepressants. For abortive therapy she has been on Zomig which did not work. Has also tried Imitrex which did not work. Has tried over-the-counter Tylenol and ibuprofen  without benefit.    10/25/21  Patient returns today.  She reports that she continues to have 25 headache days a month.  She is interested in doing the Botox today but is also interested in trying the Ajovy 1 more time.  She is comfortable with the needles but feels that the Parminder was helping and works in a different mechanism.  Ajovy was stopped last year because the insurance would not cover it anymore.  She reports that the headaches had improved with Ajovy but not completely.  Had questions about the Maxalt.  Headaches have been lasting multiple days.  Headache does become more tolerable after she takes Maxalt for day 1 and wonders if she can take it on day 2 or not.  Discussed with her about taking the Maxalt as soon as she can and repeating it in 2 hours.  She can take the Maxalt the next day the discussed that it might not be as effective as it was on day 1.  She reports no other new issues.    12/27/21  Patient returns today.  She previously was having 25 headache days a month but now is having 16-18 headache days a month.  Feels that the Botox has reduced the edge and headaches are less severe.  Continues to have headaches on the right side.  Occasionally will get the visual auras.    Is on Maxalt but feels that it occasionally will not work.  Is not using anything with the Maxalt.  Does not feel she has enough medication for the whole month.  Is interested in trying new medications.  Given that she gets limited supply of Maxalt she cannot predict which headaches are going to be severe enough that she wants to use the Maxalt or not.  Does daily using the Maxalt.    We discussed the Ajovy and it was decided that we will hold off on that for right now since she is trying the Botox and trying to medications at the same time might not be very beneficial.    9/29/22  To distribute Botox was done 2 months ago.  She has had 4 sessions of Botox.  Reports that the headaches are 3-4 times a week but she is not having 6  to 7 days of continuous headaches.  Headaches can last the full day.  She generally uses Nurtec, ibuprofen or Maxalt in various combinations depending on the severity of her headaches.  Is interested in trying other medications to see if the headaches can be under better control.  Botox with each session is making things better.  Denies any side effects to the Botox.  No other new symptoms.    4/3/23  Patient returns today.  She has had about 6 sessions of Botox.  The higher dose of Botox has been more effective.  She is having 5-6 bad headaches in the month.  Does have minor headaches couple of times a week.  Is using ibuprofen for those.  Nurtec works the best for abortive therapy.  Relpax has not been so effective.  Maxalt has been more effective.  Wants to continue with the Maxalt.  No other new symptoms.  Headaches are much less severe compared to before with the Botox though continues to be in the same frequency.  Wants to try the Botox for few more sessions.    10/31/23  Patient returns today.  With the last session of Botox she had headaches every day but these are much less severe compared to before.  More tolerable.  Nurtec is also helping her preventive medication.  Maxalt does work as abortive therapy.  Wants to continue the current plan.  Discussed about adding other preventive medications and willing to try verapamil.  Topamax and propanolol have been used in the past.  No other new concerns.  No other provoking factors other than the weather.    3/26/24  Patient returns today  1.  Feels that the headaches are slowly getting better with the Botox but continues to have the headache still quite frequently.  Maxalt does work as abortive therapy.  She has found benefit with the verapamil as well.  No side effects.  Combination with Nurtec has been beneficial.  Headaches are overall slowly getting better with the treatments.  2.  Has not found any new triggers for headaches.  3.  Is interested in trying  trigger point injections today.  No other further concerns.    6/14/24  Patient returns today  1.  Trigger point injections have been helpful.  She wants to continue it.  Wants injections done in the longissimus muscle  2.  Botox has been helping significantly with the headaches.  The headaches are still very frequent but less severe.  3.  Remains on the verapamil.  Is finding some benefit though cannot say for sure  4.  Nurtec as a preventative is not very helpful though as an abortive medication is very helpful.  Has to use it more than 8 times a month.  Additional Maxalt as needed for more severe headaches  No new medications    10/7/24  Patient returns for  1.  Headaches are still 15-18 headache days a month though they are less severe compared to before.  They are not lasting multiple days.  He is using Maxalt/Nurtec for abortive therapy  2.  Uses verapamil for prevention of headaches in addition to the Botox and trigger point injections.  This finding the combination useful.  Trigger point injections are more helpful than the Botox though wants to continue the Botox as well.  No other new concerns.    1/6/24  Patient returns today  1.  Headaches are about 3 times a week.  They are much less severe compared to before she has had 2 episodes of really severe headaches lasting for 2 weeks.  Her Maxalt and Nurtec did not work this time.  Verapamil has been helpful.  Botox and trigger point are also helping.  Feels that the headaches are more in the frontal region.  Trigger points prevent the headaches in the neck region.  She wants to try a higher dose in the trapezius today.  No other new concerns.    2/20/25  Patient returns today  1.  Headaches are about the same compared to before.  She stopped the verapamil as she did not find a lot of benefit and was feeling lightheaded.  Stopping the verapamil has not caused any problems.  Headaches have been more severe in the last week with the Botox wearing off.  She was  put on prednisone as she had called the clinic and that has significantly helped.  The headache behind the eye is also better.  She has 1 more day of prednisone left.  Discussed limitations and using prednisone on a regular basis.  Botox 200 units is improved and we will try that higher dose today and see how she does.  No other new concerns.    4/7/25  Patient returns today.  1.  Headaches have not significantly improved since the higher dose of Botox.  She still has 4-5 headaches a week.  Feels that the headaches are less severe and lasting less longer compared to before.  No side effects with the higher dose of Botox.  We discussed changing the dose of the trigger points it was decided to keep her on the same dose for right now and try the Botox 1 more time and then decide.  She is traveling to [place] and will be late on her Botox and is requesting prednisone to cover her for this 2 weeks.  Other medications are helpful no change.  No new concerns.    7/7/25  Patient returns today  1.  Continues to have a few headaches a week.  Botox has been helping.  Things are much better with the higher dose of Botox and the lower dose of Botox.  Trigger point injections have also been helpful more with the shoulder tension.  She continues to have a right sided headache behind the eye which is unrelated to the Botox.  As Botox does not affect this.  Nurtec and Maxalt are helpful for abortive therapy.  Overall no side effects with the medication.  Stopping the verapamil has not really made any difference with the headaches.  She is recently had a spell of passing out and currently is being evaluated through cardiology.  Does not want to try any new medications till this can be figured out  No other new concerns    Previous history is reviewed and this is unchanged.    PAST MEDICAL/SURGICAL HISTORY  No past medical history on file.  Patient Active Problem List   Diagnosis     Allergic rhinitis     Anisometropia     Anxiety  state     Carpal tunnel syndrome     Classical migraine with intractable migraine     Gluten intolerance     History of strabismus surgery     Hypotropia of left eye     Insomnia     Migraine headache     Mild persistent asthma     Moderate episode of recurrent major depressive disorder (H)     Pre-diabetes     Morbid obesity (H)   Significant depression, anxiety, prediabetes, gluten intolerance, high cholesterol    FAMILY HISTORY  Family History   Problem Relation Age of Onset     Migraines Mother    Positive for mother and grandmother with migraines. Daughter with migraines    SOCIAL HISTORY  Social History     Tobacco Use     Smoking status: Never     Smokeless tobacco: Never   Substance Use Topics     Alcohol use: Not Currently       SYSTEMS REVIEW  Twelve-system ROS was done and other than the HPI this was negative except for numbness and tingling, balance coordination problems, dizziness, memory loss, anxiety, depression, respiratory problems/asthma.  No new symptoms/issues.    MEDICATIONS  Current Outpatient Medications   Medication Sig Dispense Refill     albuterol (PROAIR HFA/PROVENTIL HFA/VENTOLIN HFA) 108 (90 Base) MCG/ACT inhaler Inhale 2 puffs into the lungs       atorvastatin (LIPITOR) 40 MG tablet Take 40 mg by mouth daily.       calcium citrate-vitamin D (CITRACAL) 315-6.25 MG-MCG TABS per tablet Take by mouth 2 times daily       cetirizine (ZYRTEC) 10 MG tablet Take 10-20 mg by mouth       cholecalciferol 50 MCG (2000 UT) CAPS 3 daily       fluticasone-salmeterol (ADVAIR HFA) 230-21 MCG/ACT inhaler Inhale 1 puff into the lungs.       ketorolac (TORADOL) 10 MG tablet PRN for headaches. Not more than 1 tablet/day or 3 tablets/week or more than 6 times a month. 20 tablet 1     lisdexamfetamine (VYVANSE) 30 MG capsule Take 30 mg by mouth every morning.       rimegepant (NURTEC) 75 MG ODT tablet Place 1 tablet (75 mg) under the tongue every 48 hours 16 tablet 3     rizatriptan (MAXALT) 10 MG tablet Take  "1 tablet by mouth at onset of migraine. May repeat in 2 hours. 18 tablet 5     traZODone (DESYREL) 50 MG tablet Take 50 mg by mouth at bedtime.       AUVELITY  MG TBCR Take  mg by mouth 2 times daily (Patient not taking: Reported on 5/28/2025)       DULoxetine (CYMBALTA) 60 MG capsule Take 60 mg by mouth (Patient not taking: Reported on 5/28/2025)       traZODone (DESYREL) 100 MG tablet Take 100 mg by mouth       No current facility-administered medications for this visit.        PHYSICAL EXAMINATION  VITALS: BP (!) 84/58 (BP Location: Left arm, Patient Position: Sitting)   Pulse 90   Ht 1.651 m (5' 5\")   Wt 72.2 kg (159 lb 3.2 oz)   BMI 26.49 kg/m    GENERAL: Healthy appearing, alert, no acute distress, normal habitus.  CARDIOVASCULAR: Extremities warm and well perfused. Pulses present.   NEUROLOGICAL:  Patient is awake and grossly oriented to self, place and time.  Attention span is normal.  Memory is grossly intact.  Language is fluent and follows commands appropriately.  Appropriate fund of knowledge. Cranial nerves 2-12 are intact. There is no pronator drift.  Motor exam shows 5/5 strength in all extremities.  Tone is symmetric bilaterally in upper and lower extremities.  (Previously reflexes are symmetric and 2+ in upper extremities and lower extremities. Sensory exam is grossly intact to light touch, pin prick and vibration.) Finger to nose and heel to shin is without dysmetria.  Romberg is negative.  Gait is normal and the patient is able to do tandem walk and walk on toes and heels.  Exam unchanged compared to before    DIAGNOSTICS  RELEVANT LABS  Recent BMP shows elevated creatinine of 1.21.  TSH normal in the past.    OUTSIDE RECORDS  Notes from Dr. Guerra reviewed. Pertinent information is noted in the HPI.     MRI brain-images reviewed with patient.  She does have low-lying cerebellar tonsils though these are very minimal.  No major structural lesions noted.  IMPRESSION:  1.  No evidence of " an acute intracranial abnormality.  2.  Low-lying cerebellar tonsils as above.    Reviewed ER notes.    IMPRESSION/REPORT/PLAN  Intractable chronic migraine without aura and without status migrainosus  Obesity  Concurrent use of multiple antidepressants  Low-lying cerebellar tonsils  Syncopal spell    This is a 50 year old female with headache suggestive of chronic migraines that are refractory to multiple medications.  MRI brain was negative for structural lesions.  Does show low-lying cerebellar tonsils though most likely these are asymptomatic.     Prevention of headaches she has tried Topamax, propranolol, Ajovy, Nurtec without significant benefit.  Currently is on Botox, trigger points which are helpful.  Verapamil did not provide any significant benefit and she does complain of lightheadedness and this has been stopped.  She cannot use other antidepressants as she is already on antidepressants.  Could consider trial of Depakote there is concerns for weight gain.  Will trial a bit higher dose of trigger points in the trapezius as that has been helpful and will see if that helps prevent the multiday headaches.  Previously 200 units of Botox was approved but now insurance is only approving the 155 which might have made the headaches worse.  Insurance is now approving the 200 units again and this has been helpful with the trigger points and will continue.  Headaches is still on the appropriately good control but due to recent syncopal spell she wants to hold off on trying other medications.  Continue current dose of trigger points.    For abortive therapy she has tried Imitrex, Zomig, Florinol which have not been helpful.  Combination of Maxalt and Nurtec is helpful and will continue.  Cannot try ibuprofen.  Uses Tylenol for less severe headaches.  Will add Toradol as a rescue medication.  Could consider steroids.  Steroids have been helpful though because of her gastric sleeve will use these very sparingly.   "Continue.     Encourage her to keep a log of her headaches.  Alternate between trigger points and Botox.    -    Ibuprofen 400 mg initially and then Maxalt in 1 hour.  -     Botulinum Toxin Type A (BOTOX) 200 units injection 200 Units-FUTURE  -     rizatriptan (MAXALT) 10 MG tablet; Take 1 tablet (10 mg) by mouth at onset of headache for migraine May repeat in 2 hours.  -     rimegepant (NURTEC) 75 MG ODT tablet; Take 1 tablet (75 mg) by mouth every 48 hours  Trigger point injections  -     ketorolac (TORADOL) 10 MG tablet; PRN for headaches. Not more than 1 tablet/day or 3 tablets/week or more than 6 times a month.-To use sparingly  - Previously-prednisone for status migrainosus.     Return in about 3 months (around 10/7/2025) for In-Clinic Visit (must), Trigger point injections.     Botox PA  \" The patient has a diagnosis of chronic migraines. She has more than 15 headache days a month with each headache lasting at least 4 hours despite use of triptans. Her headaches have been there for over 6 months. She has been screened for medication overuse headaches and she does not have that. She has tried Topamax, Propranolol for 3 months without any significant benefit. Antidepressants cannot be added since she is on multiple antidepressants already. She has tried Ajovy with minimal success. I would request that the Botox be approved for her.    She has had about 50% improvement with 155 units of Botox.  I will request a higher dose of Botox to be approved to see if she can get even further benefit.  70-80% improvement with the higher dose of Botox.  \"    Over 30 minutes were spent coordinating the care for the patient on the day of the encounter.  This includes previsit, during visit and post visit activities as documented above.  Counseling patient.  Reviewing chart/prescription management.  Reviewing ER notes.  Refractory problem.  (Activities include but not inclusive of reviewing chart, reviewing outside records, " reviewing labs and imaging study results as well as the images, patient visit time including getting history and exam,  use if applicable, review of test results with the patient and coming up with a plan in a shared model, counseling patient and family, education and answering patient questions, EMR , EMR diagnosis entry and problem list management, medication reconciliation and prescription management if applicable, paperwork if applicable, printing documents and documentation of the visit activities.)        Edi Rojo MD  Neurologist  [facility]  Tel:- [phone]    This note was dictated using voice recognition software.  Any grammatical or context distortions are unintentional and inherent to the software.    The longitudinal plan of care for the diagnosis(es)/condition(s) as documented were addressed during this visit. Due to the added complexity in care, I will continue to support Efra in the subsequent management and with ongoing continuity of care.      Again, thank you for allowing me to participate in the care of your patient.        Sincerely,        Edi Rojo MD    Electronically signed

## 2025-07-07 NOTE — NURSING NOTE
Chief Complaint   Patient presents with    Headache     Pt states headache had been worse since the last time she was in. Pt currently has a headache.      Diana Bocanegra MA on 7/7/2025 at 8:41 AM

## 2025-08-26 ENCOUNTER — OFFICE VISIT (OUTPATIENT)
Dept: NEUROLOGY | Facility: CLINIC | Age: 50
End: 2025-08-26
Payer: COMMERCIAL

## 2025-08-26 VITALS
HEART RATE: 92 BPM | SYSTOLIC BLOOD PRESSURE: 95 MMHG | BODY MASS INDEX: 25.54 KG/M2 | WEIGHT: 153.5 LBS | DIASTOLIC BLOOD PRESSURE: 69 MMHG

## 2025-08-26 DIAGNOSIS — G43.719 INTRACTABLE CHRONIC MIGRAINE WITHOUT AURA AND WITHOUT STATUS MIGRAINOSUS: Primary | ICD-10-CM

## 2025-08-26 PROCEDURE — 64615 CHEMODENERV MUSC MIGRAINE: CPT | Performed by: PSYCHIATRY & NEUROLOGY

## 2025-08-26 RX ORDER — FLUDROCORTISONE ACETATE 0.1 MG/1
TABLET ORAL
COMMUNITY